# Patient Record
Sex: FEMALE | Race: WHITE | NOT HISPANIC OR LATINO | Employment: FULL TIME | URBAN - METROPOLITAN AREA
[De-identification: names, ages, dates, MRNs, and addresses within clinical notes are randomized per-mention and may not be internally consistent; named-entity substitution may affect disease eponyms.]

---

## 2020-04-09 PROCEDURE — 87635 SARS-COV-2 COVID-19 AMP PRB: CPT | Performed by: NURSE PRACTITIONER

## 2020-04-09 PROCEDURE — U0003 INFECTIOUS AGENT DETECTION BY NUCLEIC ACID (DNA OR RNA); SEVERE ACUTE RESPIRATORY SYNDROME CORONAVIRUS 2 (SARS-COV-2) (CORONAVIRUS DISEASE [COVID-19]), AMPLIFIED PROBE TECHNIQUE, MAKING USE OF HIGH THROUGHPUT TECHNOLOGIES AS DESCRIBED BY CMS-2020-01-R: HCPCS | Performed by: NURSE PRACTITIONER

## 2020-04-11 ENCOUNTER — TELEPHONE (OUTPATIENT)
Dept: URGENT CARE | Facility: CLINIC | Age: 50
End: 2020-04-11

## 2020-12-17 PROCEDURE — U0003 INFECTIOUS AGENT DETECTION BY NUCLEIC ACID (DNA OR RNA); SEVERE ACUTE RESPIRATORY SYNDROME CORONAVIRUS 2 (SARS-COV-2) (CORONAVIRUS DISEASE [COVID-19]), AMPLIFIED PROBE TECHNIQUE, MAKING USE OF HIGH THROUGHPUT TECHNOLOGIES AS DESCRIBED BY CMS-2020-01-R: HCPCS | Performed by: FAMILY MEDICINE

## 2021-03-30 ENCOUNTER — OFFICE VISIT (OUTPATIENT)
Dept: FAMILY MEDICINE CLINIC | Facility: CLINIC | Age: 51
End: 2021-03-30

## 2021-03-30 VITALS
BODY MASS INDEX: 46.6 KG/M2 | HEIGHT: 63 IN | DIASTOLIC BLOOD PRESSURE: 100 MMHG | HEART RATE: 115 BPM | SYSTOLIC BLOOD PRESSURE: 157 MMHG | TEMPERATURE: 97.5 F | OXYGEN SATURATION: 98 % | WEIGHT: 263 LBS

## 2021-03-30 DIAGNOSIS — Z12.11 COLON CANCER SCREENING: ICD-10-CM

## 2021-03-30 DIAGNOSIS — Z12.83 SKIN CANCER SCREENING: ICD-10-CM

## 2021-03-30 DIAGNOSIS — E78.5 DYSLIPIDEMIA: ICD-10-CM

## 2021-03-30 DIAGNOSIS — R63.5 WEIGHT GAIN: ICD-10-CM

## 2021-03-30 DIAGNOSIS — R79.0 DECREASED FERRITIN: ICD-10-CM

## 2021-03-30 DIAGNOSIS — R73.09 ELEVATED GLUCOSE: ICD-10-CM

## 2021-03-30 DIAGNOSIS — E55.9 VITAMIN D DEFICIENCY: Primary | ICD-10-CM

## 2021-03-30 DIAGNOSIS — E61.1 IRON DEFICIENCY: ICD-10-CM

## 2021-03-30 DIAGNOSIS — I10 ESSENTIAL HYPERTENSION: ICD-10-CM

## 2021-03-30 DIAGNOSIS — Z12.31 ENCOUNTER FOR SCREENING MAMMOGRAM FOR MALIGNANT NEOPLASM OF BREAST: ICD-10-CM

## 2021-03-30 PROCEDURE — 82306 VITAMIN D 25 HYDROXY: CPT | Performed by: FAMILY MEDICINE

## 2021-03-30 PROCEDURE — 85025 COMPLETE CBC W/AUTO DIFF WBC: CPT | Performed by: FAMILY MEDICINE

## 2021-03-30 PROCEDURE — 84466 ASSAY OF TRANSFERRIN: CPT | Performed by: FAMILY MEDICINE

## 2021-03-30 PROCEDURE — 84439 ASSAY OF FREE THYROXINE: CPT | Performed by: FAMILY MEDICINE

## 2021-03-30 PROCEDURE — 80061 LIPID PANEL: CPT | Performed by: FAMILY MEDICINE

## 2021-03-30 PROCEDURE — 83036 HEMOGLOBIN GLYCOSYLATED A1C: CPT | Performed by: FAMILY MEDICINE

## 2021-03-30 PROCEDURE — 83540 ASSAY OF IRON: CPT | Performed by: FAMILY MEDICINE

## 2021-03-30 PROCEDURE — 99203 OFFICE O/P NEW LOW 30 MIN: CPT | Performed by: FAMILY MEDICINE

## 2021-03-30 PROCEDURE — 80053 COMPREHEN METABOLIC PANEL: CPT | Performed by: FAMILY MEDICINE

## 2021-03-30 PROCEDURE — 84443 ASSAY THYROID STIM HORMONE: CPT | Performed by: FAMILY MEDICINE

## 2021-03-30 RX ORDER — BISOPROLOL FUMARATE AND HYDROCHLOROTHIAZIDE 5; 6.25 MG/1; MG/1
1 TABLET ORAL DAILY
Qty: 30 TABLET | Refills: 1 | Status: SHIPPED | OUTPATIENT
Start: 2021-03-30 | End: 2021-05-26 | Stop reason: SDUPTHER

## 2021-03-31 LAB
25(OH)D3 SERPL-MCNC: 55.5 NG/ML (ref 30–100)
ALBUMIN SERPL-MCNC: 4.5 G/DL (ref 3.5–5.2)
ALBUMIN/GLOB SERPL: 1.4 G/DL
ALP SERPL-CCNC: 160 U/L (ref 39–117)
ALT SERPL W P-5'-P-CCNC: 35 U/L (ref 1–33)
ANION GAP SERPL CALCULATED.3IONS-SCNC: 11.3 MMOL/L (ref 5–15)
AST SERPL-CCNC: 29 U/L (ref 1–32)
BASOPHILS # BLD AUTO: 0.08 10*3/MM3 (ref 0–0.2)
BASOPHILS NFR BLD AUTO: 1 % (ref 0–1.5)
BILIRUB SERPL-MCNC: 0.5 MG/DL (ref 0–1.2)
BUN SERPL-MCNC: 12 MG/DL (ref 6–20)
BUN/CREAT SERPL: 14.6 (ref 7–25)
CALCIUM SPEC-SCNC: 10.2 MG/DL (ref 8.6–10.5)
CHLORIDE SERPL-SCNC: 100 MMOL/L (ref 98–107)
CHOLEST SERPL-MCNC: 194 MG/DL (ref 0–200)
CO2 SERPL-SCNC: 25.7 MMOL/L (ref 22–29)
CREAT SERPL-MCNC: 0.82 MG/DL (ref 0.57–1)
DEPRECATED RDW RBC AUTO: 43.9 FL (ref 37–54)
EOSINOPHIL # BLD AUTO: 0.06 10*3/MM3 (ref 0–0.4)
EOSINOPHIL NFR BLD AUTO: 0.8 % (ref 0.3–6.2)
ERYTHROCYTE [DISTWIDTH] IN BLOOD BY AUTOMATED COUNT: 14.6 % (ref 12.3–15.4)
GFR SERPL CREATININE-BSD FRML MDRD: 73 ML/MIN/1.73
GLOBULIN UR ELPH-MCNC: 3.2 GM/DL
GLUCOSE SERPL-MCNC: 100 MG/DL (ref 65–99)
HBA1C MFR BLD: 5.3 % (ref 3.5–5.6)
HCT VFR BLD AUTO: 46.4 % (ref 34–46.6)
HDLC SERPL-MCNC: 59 MG/DL (ref 40–60)
HGB BLD-MCNC: 15 G/DL (ref 12–15.9)
IMM GRANULOCYTES # BLD AUTO: 0.02 10*3/MM3 (ref 0–0.05)
IMM GRANULOCYTES NFR BLD AUTO: 0.3 % (ref 0–0.5)
IRON 24H UR-MRATE: 122 MCG/DL (ref 37–145)
IRON SATN MFR SERPL: 26 % (ref 20–50)
LDLC SERPL CALC-MCNC: 120 MG/DL (ref 0–100)
LDLC/HDLC SERPL: 2.01 {RATIO}
LYMPHOCYTES # BLD AUTO: 1.39 10*3/MM3 (ref 0.7–3.1)
LYMPHOCYTES NFR BLD AUTO: 18.1 % (ref 19.6–45.3)
MCH RBC QN AUTO: 26.6 PG (ref 26.6–33)
MCHC RBC AUTO-ENTMCNC: 32.3 G/DL (ref 31.5–35.7)
MCV RBC AUTO: 82.3 FL (ref 79–97)
MONOCYTES # BLD AUTO: 0.44 10*3/MM3 (ref 0.1–0.9)
MONOCYTES NFR BLD AUTO: 5.7 % (ref 5–12)
NEUTROPHILS NFR BLD AUTO: 5.69 10*3/MM3 (ref 1.7–7)
NEUTROPHILS NFR BLD AUTO: 74.1 % (ref 42.7–76)
NRBC BLD AUTO-RTO: 0 /100 WBC (ref 0–0.2)
PLATELET # BLD AUTO: 300 10*3/MM3 (ref 140–450)
PMV BLD AUTO: 12 FL (ref 6–12)
POTASSIUM SERPL-SCNC: 4 MMOL/L (ref 3.5–5.2)
PROT SERPL-MCNC: 7.7 G/DL (ref 6–8.5)
RBC # BLD AUTO: 5.64 10*6/MM3 (ref 3.77–5.28)
SODIUM SERPL-SCNC: 137 MMOL/L (ref 136–145)
T4 FREE SERPL-MCNC: 1.37 NG/DL (ref 0.93–1.7)
TIBC SERPL-MCNC: 477 MCG/DL (ref 298–536)
TRANSFERRIN SERPL-MCNC: 320 MG/DL (ref 200–360)
TRIGL SERPL-MCNC: 82 MG/DL (ref 0–150)
TSH SERPL DL<=0.05 MIU/L-ACNC: 0.93 UIU/ML (ref 0.27–4.2)
VLDLC SERPL-MCNC: 15 MG/DL (ref 5–40)
WBC # BLD AUTO: 7.68 10*3/MM3 (ref 3.4–10.8)

## 2021-04-12 ENCOUNTER — APPOINTMENT (OUTPATIENT)
Dept: OTHER | Facility: HOSPITAL | Age: 51
End: 2021-04-12

## 2021-04-12 ENCOUNTER — HOSPITAL ENCOUNTER (OUTPATIENT)
Dept: MAMMOGRAPHY | Facility: HOSPITAL | Age: 51
Discharge: HOME | End: 2021-04-12
Admitting: FAMILY MEDICINE

## 2021-04-12 DIAGNOSIS — Z09 FOLLOW UP: ICD-10-CM

## 2021-04-12 DIAGNOSIS — Z12.31 ENCOUNTER FOR SCREENING MAMMOGRAM FOR MALIGNANT NEOPLASM OF BREAST: ICD-10-CM

## 2021-04-12 PROCEDURE — 77063 BREAST TOMOSYNTHESIS BI: CPT

## 2021-04-12 PROCEDURE — 77067 SCR MAMMO BI INCL CAD: CPT

## 2021-04-23 ENCOUNTER — HOSPITAL ENCOUNTER (OUTPATIENT)
Dept: MAMMOGRAPHY | Facility: HOSPITAL | Age: 51
Discharge: HOME | End: 2021-04-23

## 2021-04-23 ENCOUNTER — HOSPITAL ENCOUNTER (OUTPATIENT)
Dept: ULTRASOUND IMAGING | Facility: HOSPITAL | Age: 51
Discharge: HOME | End: 2021-04-23

## 2021-04-23 DIAGNOSIS — R92.8 ABNORMALITY OF BOTH BREASTS ON SCREENING MAMMOGRAM: ICD-10-CM

## 2021-04-23 PROCEDURE — 77066 DX MAMMO INCL CAD BI: CPT

## 2021-04-23 PROCEDURE — 76642 ULTRASOUND BREAST LIMITED: CPT

## 2021-04-23 PROCEDURE — G0279 TOMOSYNTHESIS, MAMMO: HCPCS

## 2021-04-26 ENCOUNTER — APPOINTMENT (OUTPATIENT)
Dept: MAMMOGRAPHY | Facility: HOSPITAL | Age: 51
End: 2021-04-26

## 2021-04-26 ENCOUNTER — APPOINTMENT (OUTPATIENT)
Dept: ULTRASOUND IMAGING | Facility: HOSPITAL | Age: 51
End: 2021-04-26

## 2021-04-26 ENCOUNTER — TELEPHONE (OUTPATIENT)
Dept: FAMILY MEDICINE CLINIC | Facility: CLINIC | Age: 51
End: 2021-04-26

## 2021-04-26 NOTE — TELEPHONE ENCOUNTER
Hub is instructed to read this note to patient.  CALLED PT. NO ANSWER. PER HIPAA, MAY LEAVE DETAILED VM. VM LEFT ADVISING PT THAT MAMMOGRAM WITH ULTRASOUND DO SHOW A LIKELY BENIGN LESION. THEY ARE RECOMMENDING A 6 MO FOLLOW UP WITH A DIAGNOSTIC MAMMOGRAM AND ULTRASOUND TO DETERMINE STABILITY.

## 2021-04-26 NOTE — TELEPHONE ENCOUNTER
----- Message from Ericka Peñaloza MD sent at 4/25/2021 10:15 AM EDT -----  MAMMOGRAM AND US DO SHOW LIKELY BENIGN LESION, RECOMMED FU 6 MOS.  SEE REPROTS

## 2021-05-03 ENCOUNTER — TELEPHONE (OUTPATIENT)
Dept: FAMILY MEDICINE CLINIC | Facility: CLINIC | Age: 51
End: 2021-05-03

## 2021-05-26 NOTE — TELEPHONE ENCOUNTER
Last visit:  3/30/21  Next visit:7/1/21  Last labs: 3/30/21    Rx requested: Ziac   Pharmacy: [facility]

## 2021-05-27 RX ORDER — BISOPROLOL FUMARATE AND HYDROCHLOROTHIAZIDE 5; 6.25 MG/1; MG/1
1 TABLET ORAL DAILY
Qty: 30 TABLET | Refills: 1 | Status: SHIPPED | OUTPATIENT
Start: 2021-05-27 | End: 2021-07-26 | Stop reason: SDUPTHER

## 2021-06-17 ENCOUNTER — TELEPHONE (OUTPATIENT)
Dept: FAMILY MEDICINE CLINIC | Facility: CLINIC | Age: 51
End: 2021-06-17

## 2021-07-21 ENCOUNTER — OFFICE VISIT (OUTPATIENT)
Dept: FAMILY MEDICINE CLINIC | Facility: CLINIC | Age: 51
End: 2021-07-21

## 2021-07-21 VITALS
HEIGHT: 63 IN | BODY MASS INDEX: 43.41 KG/M2 | HEART RATE: 90 BPM | TEMPERATURE: 97 F | OXYGEN SATURATION: 99 % | WEIGHT: 245 LBS | SYSTOLIC BLOOD PRESSURE: 139 MMHG | DIASTOLIC BLOOD PRESSURE: 85 MMHG

## 2021-07-21 DIAGNOSIS — I10 ESSENTIAL HYPERTENSION: ICD-10-CM

## 2021-07-21 DIAGNOSIS — R79.89 ELEVATED LFTS: ICD-10-CM

## 2021-07-21 DIAGNOSIS — Z00.00 PHYSICAL EXAM, ANNUAL: ICD-10-CM

## 2021-07-21 DIAGNOSIS — Z12.4 PAP SMEAR FOR CERVICAL CANCER SCREENING: ICD-10-CM

## 2021-07-21 DIAGNOSIS — R73.09 ELEVATED GLUCOSE: Primary | ICD-10-CM

## 2021-07-21 DIAGNOSIS — E66.01 MORBID OBESITY WITH BMI OF 40.0-44.9, ADULT (HCC): ICD-10-CM

## 2021-07-21 PROCEDURE — 99396 PREV VISIT EST AGE 40-64: CPT | Performed by: FAMILY MEDICINE

## 2021-07-22 PROCEDURE — 87591 N.GONORRHOEAE DNA AMP PROB: CPT | Performed by: FAMILY MEDICINE

## 2021-07-22 PROCEDURE — 87491 CHLMYD TRACH DNA AMP PROBE: CPT | Performed by: FAMILY MEDICINE

## 2021-07-22 PROCEDURE — 88175 CYTOPATH C/V AUTO FLUID REDO: CPT | Performed by: FAMILY MEDICINE

## 2021-07-26 RX ORDER — BISOPROLOL FUMARATE AND HYDROCHLOROTHIAZIDE 5; 6.25 MG/1; MG/1
1 TABLET ORAL DAILY
Qty: 90 TABLET | Refills: 1 | Status: SHIPPED | OUTPATIENT
Start: 2021-07-26 | End: 2022-01-21 | Stop reason: SDUPTHER

## 2021-07-26 NOTE — TELEPHONE ENCOUNTER
Date of last refill:05/27/2021    Is there an Inspect on file:NA     Last Appointment:07/21/2021    Next Appointment: 01/19/2022       Additional information:

## 2021-07-27 LAB
C TRACH RRNA CVX QL NAA+PROBE: NEGATIVE
CONV .: NORMAL
CYTOLOGIST CVX/VAG CYTO: NORMAL
CYTOLOGY CVX/VAG DOC CYTO: NORMAL
CYTOLOGY CVX/VAG DOC THIN PREP: NORMAL
DX ICD CODE: NORMAL
HIV 1 & 2 AB SER-IMP: NORMAL
N GONORRHOEA RRNA CVX QL NAA+PROBE: NEGATIVE
OTHER STN SPEC: NORMAL
STAT OF ADQ CVX/VAG CYTO-IMP: NORMAL

## 2021-10-26 ENCOUNTER — TELEPHONE (OUTPATIENT)
Dept: FAMILY MEDICINE CLINIC | Facility: CLINIC | Age: 51
End: 2021-10-26

## 2021-10-26 DIAGNOSIS — R92.8 ABNORMAL MAMMOGRAM OF LEFT BREAST: Primary | ICD-10-CM

## 2021-10-26 NOTE — TELEPHONE ENCOUNTER
----- Message from Idalia Oliver sent at 10/26/2021 12:54 PM EDT -----  Regarding: Mammogram  I had a mammogram and follow up mammogram with ultrasound back in April. At that time, the radiologist recommended I have a follow up mammogram on the left breast in 6 months.  I called [facility] to set up an appt for that, and they said they just need you to put in an order for it so we can get it going.      Thank you, Idalia Oliver

## 2021-11-01 ENCOUNTER — CLINICAL SUPPORT (OUTPATIENT)
Dept: FAMILY MEDICINE CLINIC | Facility: CLINIC | Age: 51
End: 2021-11-01

## 2021-11-01 DIAGNOSIS — R79.89 ELEVATED LFTS: ICD-10-CM

## 2021-11-01 DIAGNOSIS — R73.09 ELEVATED GLUCOSE: ICD-10-CM

## 2021-11-01 PROCEDURE — 80053 COMPREHEN METABOLIC PANEL: CPT | Performed by: FAMILY MEDICINE

## 2021-11-01 PROCEDURE — 36415 COLL VENOUS BLD VENIPUNCTURE: CPT | Performed by: FAMILY MEDICINE

## 2021-11-01 NOTE — PROGRESS NOTES
Venipuncture Blood Specimen Collection  Venipuncture performed in RAC by Penny Jimenez CMA with good hemostasis. Patient tolerated the procedure well without complications.   11/01/21   Penny Jimenez CMA

## 2021-11-02 LAB
ALBUMIN SERPL-MCNC: 4.2 G/DL (ref 3.5–5.2)
ALBUMIN/GLOB SERPL: 1.4 G/DL
ALP SERPL-CCNC: 154 U/L (ref 39–117)
ALT SERPL W P-5'-P-CCNC: 32 U/L (ref 1–33)
ANION GAP SERPL CALCULATED.3IONS-SCNC: 10.9 MMOL/L (ref 5–15)
AST SERPL-CCNC: 25 U/L (ref 1–32)
BILIRUB SERPL-MCNC: 0.8 MG/DL (ref 0–1.2)
BUN SERPL-MCNC: 16 MG/DL (ref 6–20)
BUN/CREAT SERPL: 21.3 (ref 7–25)
CALCIUM SPEC-SCNC: 9.8 MG/DL (ref 8.6–10.5)
CHLORIDE SERPL-SCNC: 104 MMOL/L (ref 98–107)
CO2 SERPL-SCNC: 26.1 MMOL/L (ref 22–29)
CREAT SERPL-MCNC: 0.75 MG/DL (ref 0.57–1)
GFR SERPL CREATININE-BSD FRML MDRD: 81 ML/MIN/1.73
GLOBULIN UR ELPH-MCNC: 3 GM/DL
GLUCOSE SERPL-MCNC: 97 MG/DL (ref 65–99)
POTASSIUM SERPL-SCNC: 4.1 MMOL/L (ref 3.5–5.2)
PROT SERPL-MCNC: 7.2 G/DL (ref 6–8.5)
SODIUM SERPL-SCNC: 141 MMOL/L (ref 136–145)

## 2021-11-16 ENCOUNTER — HOSPITAL ENCOUNTER (OUTPATIENT)
Dept: MAMMOGRAPHY | Facility: HOSPITAL | Age: 51
Discharge: HOME | End: 2021-11-16

## 2021-11-16 ENCOUNTER — HOSPITAL ENCOUNTER (OUTPATIENT)
Dept: ULTRASOUND IMAGING | Facility: HOSPITAL | Age: 51
Discharge: HOME | End: 2021-11-16

## 2021-11-16 DIAGNOSIS — R92.8 ABNORMAL MAMMOGRAM OF LEFT BREAST: ICD-10-CM

## 2021-11-16 PROCEDURE — G0279 TOMOSYNTHESIS, MAMMO: HCPCS

## 2021-11-16 PROCEDURE — 77065 DX MAMMO INCL CAD UNI: CPT

## 2021-12-02 ENCOUNTER — CLINICAL SUPPORT (OUTPATIENT)
Dept: FAMILY MEDICINE CLINIC | Facility: CLINIC | Age: 51
End: 2021-12-02

## 2021-12-02 DIAGNOSIS — Z11.59 SCREENING FOR MEASLES: Primary | ICD-10-CM

## 2021-12-02 PROCEDURE — 86765 RUBEOLA ANTIBODY: CPT | Performed by: FAMILY MEDICINE

## 2021-12-02 PROCEDURE — 86735 MUMPS ANTIBODY: CPT | Performed by: FAMILY MEDICINE

## 2021-12-02 PROCEDURE — 36415 COLL VENOUS BLD VENIPUNCTURE: CPT | Performed by: FAMILY MEDICINE

## 2021-12-02 PROCEDURE — 86762 RUBELLA ANTIBODY: CPT | Performed by: FAMILY MEDICINE

## 2021-12-02 NOTE — PROGRESS NOTES
Venipuncture Blood Specimen Collection  Venipuncture performed in RAC by Penny Jimenez CMA with good hemostasis. Patient tolerated the procedure well without complications.   12/02/21   Penny Jimenez CMA

## 2021-12-03 LAB
MEV IGG SER IA-ACNC: >300 AU/ML
MUV IGG SER IA-ACNC: 190 AU/ML
RUBV IGG SERPL IA-ACNC: 6.97 INDEX

## 2022-01-21 RX ORDER — BISOPROLOL FUMARATE AND HYDROCHLOROTHIAZIDE 5; 6.25 MG/1; MG/1
1 TABLET ORAL DAILY
Qty: 90 TABLET | Refills: 1 | Status: SHIPPED | OUTPATIENT
Start: 2022-01-21 | End: 2022-07-27 | Stop reason: SDUPTHER

## 2022-02-01 ENCOUNTER — TELEPHONE (OUTPATIENT)
Dept: FAMILY MEDICINE CLINIC | Facility: CLINIC | Age: 52
End: 2022-02-01

## 2022-02-01 NOTE — TELEPHONE ENCOUNTER
LEFT PATIENT A VOICEMAIL, WE HAVE HER SCHEDULED NEXT WEEK WITH  AND ARE NEEDING TO R/S THIS APPOINTMENT.

## 2022-02-22 ENCOUNTER — OFFICE VISIT (OUTPATIENT)
Dept: FAMILY MEDICINE CLINIC | Facility: CLINIC | Age: 52
End: 2022-02-22

## 2022-02-22 VITALS
DIASTOLIC BLOOD PRESSURE: 90 MMHG | WEIGHT: 257 LBS | HEART RATE: 90 BPM | SYSTOLIC BLOOD PRESSURE: 134 MMHG | HEIGHT: 63 IN | TEMPERATURE: 97.7 F | BODY MASS INDEX: 45.54 KG/M2 | OXYGEN SATURATION: 98 %

## 2022-02-22 DIAGNOSIS — Z12.11 ENCOUNTER FOR COLORECTAL CANCER SCREENING: ICD-10-CM

## 2022-02-22 DIAGNOSIS — R79.89 ELEVATED LFTS: ICD-10-CM

## 2022-02-22 DIAGNOSIS — R73.09 ELEVATED GLUCOSE: ICD-10-CM

## 2022-02-22 DIAGNOSIS — Z79.899 MEDICATION MANAGEMENT: ICD-10-CM

## 2022-02-22 DIAGNOSIS — Z11.59 NEED FOR HEPATITIS C SCREENING TEST: ICD-10-CM

## 2022-02-22 DIAGNOSIS — R05.9 COUGH: Primary | ICD-10-CM

## 2022-02-22 DIAGNOSIS — R05.9 COUGH: ICD-10-CM

## 2022-02-22 DIAGNOSIS — Z12.12 ENCOUNTER FOR COLORECTAL CANCER SCREENING: ICD-10-CM

## 2022-02-22 DIAGNOSIS — E78.5 DYSLIPIDEMIA: ICD-10-CM

## 2022-02-22 DIAGNOSIS — R53.82 CHRONIC FATIGUE: ICD-10-CM

## 2022-02-22 LAB
ALBUMIN SERPL-MCNC: 4.2 G/DL (ref 3.5–5.2)
ALBUMIN/GLOB SERPL: 1.2 G/DL
ALP SERPL-CCNC: 187 U/L (ref 39–117)
ALT SERPL W P-5'-P-CCNC: 47 U/L (ref 1–33)
ANION GAP SERPL CALCULATED.3IONS-SCNC: 13.5 MMOL/L (ref 5–15)
AST SERPL-CCNC: 21 U/L (ref 1–32)
BASOPHILS # BLD AUTO: 0.07 10*3/MM3 (ref 0–0.2)
BASOPHILS NFR BLD AUTO: 1 % (ref 0–1.5)
BILIRUB SERPL-MCNC: 0.4 MG/DL (ref 0–1.2)
BUN SERPL-MCNC: 15 MG/DL (ref 6–20)
BUN/CREAT SERPL: 17.4 (ref 7–25)
CALCIUM SPEC-SCNC: 10.3 MG/DL (ref 8.6–10.5)
CHLORIDE SERPL-SCNC: 106 MMOL/L (ref 98–107)
CHOLEST SERPL-MCNC: 181 MG/DL (ref 0–200)
CO2 SERPL-SCNC: 22.5 MMOL/L (ref 22–29)
CREAT SERPL-MCNC: 0.86 MG/DL (ref 0.57–1)
DEPRECATED RDW RBC AUTO: 40.5 FL (ref 37–54)
EOSINOPHIL # BLD AUTO: 0.09 10*3/MM3 (ref 0–0.4)
EOSINOPHIL NFR BLD AUTO: 1.3 % (ref 0.3–6.2)
ERYTHROCYTE [DISTWIDTH] IN BLOOD BY AUTOMATED COUNT: 14 % (ref 12.3–15.4)
GFR SERPL CREATININE-BSD FRML MDRD: 69 ML/MIN/1.73
GLOBULIN UR ELPH-MCNC: 3.4 GM/DL
GLUCOSE SERPL-MCNC: 101 MG/DL (ref 65–99)
HCT VFR BLD AUTO: 44.7 % (ref 34–46.6)
HCV AB SER DONR QL: NORMAL
HDLC SERPL-MCNC: 51 MG/DL (ref 40–60)
HGB BLD-MCNC: 14.6 G/DL (ref 12–15.9)
IMM GRANULOCYTES # BLD AUTO: 0.04 10*3/MM3 (ref 0–0.05)
IMM GRANULOCYTES NFR BLD AUTO: 0.6 % (ref 0–0.5)
LDLC SERPL CALC-MCNC: 116 MG/DL (ref 0–100)
LDLC/HDLC SERPL: 2.26 {RATIO}
LYMPHOCYTES # BLD AUTO: 1.6 10*3/MM3 (ref 0.7–3.1)
LYMPHOCYTES NFR BLD AUTO: 22.9 % (ref 19.6–45.3)
MCH RBC QN AUTO: 26.7 PG (ref 26.6–33)
MCHC RBC AUTO-ENTMCNC: 32.7 G/DL (ref 31.5–35.7)
MCV RBC AUTO: 81.9 FL (ref 79–97)
MONOCYTES # BLD AUTO: 0.33 10*3/MM3 (ref 0.1–0.9)
MONOCYTES NFR BLD AUTO: 4.7 % (ref 5–12)
NEUTROPHILS NFR BLD AUTO: 4.87 10*3/MM3 (ref 1.7–7)
NEUTROPHILS NFR BLD AUTO: 69.5 % (ref 42.7–76)
NRBC BLD AUTO-RTO: 0 /100 WBC (ref 0–0.2)
PLATELET # BLD AUTO: 280 10*3/MM3 (ref 140–450)
PMV BLD AUTO: 12 FL (ref 6–12)
POTASSIUM SERPL-SCNC: 4.4 MMOL/L (ref 3.5–5.2)
PROT SERPL-MCNC: 7.6 G/DL (ref 6–8.5)
RBC # BLD AUTO: 5.46 10*6/MM3 (ref 3.77–5.28)
SODIUM SERPL-SCNC: 142 MMOL/L (ref 136–145)
T4 FREE SERPL-MCNC: 1.2 NG/DL (ref 0.93–1.7)
TRIGL SERPL-MCNC: 73 MG/DL (ref 0–150)
TSH SERPL DL<=0.05 MIU/L-ACNC: 0.79 UIU/ML (ref 0.27–4.2)
VLDLC SERPL-MCNC: 14 MG/DL (ref 5–40)
WBC NRBC COR # BLD: 7 10*3/MM3 (ref 3.4–10.8)

## 2022-02-22 PROCEDURE — 80061 LIPID PANEL: CPT | Performed by: FAMILY MEDICINE

## 2022-02-22 PROCEDURE — 84439 ASSAY OF FREE THYROXINE: CPT | Performed by: FAMILY MEDICINE

## 2022-02-22 PROCEDURE — 86803 HEPATITIS C AB TEST: CPT | Performed by: FAMILY MEDICINE

## 2022-02-22 PROCEDURE — 99214 OFFICE O/P EST MOD 30 MIN: CPT | Performed by: FAMILY MEDICINE

## 2022-02-22 PROCEDURE — 80050 GENERAL HEALTH PANEL: CPT | Performed by: FAMILY MEDICINE

## 2022-02-22 PROCEDURE — 83036 HEMOGLOBIN GLYCOSYLATED A1C: CPT | Performed by: FAMILY MEDICINE

## 2022-02-22 RX ORDER — AZELASTINE 1 MG/ML
2 SPRAY, METERED NASAL 2 TIMES DAILY
Qty: 30 ML | Refills: 2 | Status: SHIPPED | OUTPATIENT
Start: 2022-02-22

## 2022-02-22 RX ORDER — DOXYCYCLINE 100 MG/1
100 CAPSULE ORAL 2 TIMES DAILY
Qty: 20 CAPSULE | Refills: 0 | Status: SHIPPED | OUTPATIENT
Start: 2022-02-22 | End: 2022-08-12 | Stop reason: ALTCHOICE

## 2022-02-22 RX ORDER — GUAIFENESIN 600 MG/1
1200 TABLET, EXTENDED RELEASE ORAL 2 TIMES DAILY
COMMUNITY
End: 2022-08-12 | Stop reason: ALTCHOICE

## 2022-02-22 RX ORDER — BENZONATATE 200 MG/1
200 CAPSULE ORAL 3 TIMES DAILY PRN
Qty: 45 CAPSULE | Refills: 1 | Status: SHIPPED | OUTPATIENT
Start: 2022-02-22 | End: 2022-08-12 | Stop reason: ALTCHOICE

## 2022-02-23 LAB — HBA1C MFR BLD: 5.4 % (ref 3.5–5.6)

## 2022-03-21 ENCOUNTER — TELEPHONE (OUTPATIENT)
Dept: FAMILY MEDICINE CLINIC | Facility: CLINIC | Age: 52
End: 2022-03-21

## 2022-03-21 NOTE — TELEPHONE ENCOUNTER
CALLED PT NO ANSWER, CALLED PTS  PER EDMAR ABLE TO INFORM HIM OF RESULTS AND ORDERS. ADVISED HIM TO HAVE PT CALL US WITH FURTHER QUESTIONS AND IF AGREEABLE TO US OF LIVER.     ----- Message from Ericka Peñaloza MD sent at 3/21/2022 12:12 PM EDT -----  Labs show elevated lft, recommend getting US liver  Cholesterol a litle elevated  Low cholesterol diet  Other labs inacceptable range

## 2022-06-16 ENCOUNTER — TRANSCRIBE ORDERS (OUTPATIENT)
Dept: ADMINISTRATIVE | Facility: HOSPITAL | Age: 52
End: 2022-06-16

## 2022-06-16 DIAGNOSIS — Z12.31 VISIT FOR SCREENING MAMMOGRAM: Primary | ICD-10-CM

## 2022-06-20 ENCOUNTER — HOSPITAL ENCOUNTER (OUTPATIENT)
Dept: MAMMOGRAPHY | Facility: HOSPITAL | Age: 52
Discharge: HOME | End: 2022-06-20
Admitting: FAMILY MEDICINE

## 2022-06-20 DIAGNOSIS — Z12.31 VISIT FOR SCREENING MAMMOGRAM: ICD-10-CM

## 2022-06-20 PROCEDURE — 77063 BREAST TOMOSYNTHESIS BI: CPT

## 2022-06-20 PROCEDURE — 77067 SCR MAMMO BI INCL CAD: CPT

## 2022-07-27 RX ORDER — BISOPROLOL FUMARATE AND HYDROCHLOROTHIAZIDE 5; 6.25 MG/1; MG/1
1 TABLET ORAL DAILY
Qty: 90 TABLET | Refills: 1 | Status: SHIPPED | OUTPATIENT
Start: 2022-07-27 | End: 2023-01-23 | Stop reason: SDUPTHER

## 2022-08-12 ENCOUNTER — OFFICE VISIT (OUTPATIENT)
Dept: FAMILY MEDICINE CLINIC | Facility: CLINIC | Age: 52
End: 2022-08-12

## 2022-08-12 VITALS
HEART RATE: 87 BPM | HEIGHT: 63 IN | DIASTOLIC BLOOD PRESSURE: 91 MMHG | BODY MASS INDEX: 39.34 KG/M2 | WEIGHT: 222 LBS | SYSTOLIC BLOOD PRESSURE: 148 MMHG | RESPIRATION RATE: 16 BRPM | TEMPERATURE: 97.7 F

## 2022-08-12 DIAGNOSIS — R71.8 RBC ABNORMALITY: ICD-10-CM

## 2022-08-12 DIAGNOSIS — R74.8 ELEVATED LIVER ENZYMES: Primary | ICD-10-CM

## 2022-08-12 PROCEDURE — 99213 OFFICE O/P EST LOW 20 MIN: CPT | Performed by: NURSE PRACTITIONER

## 2022-08-12 PROCEDURE — 80053 COMPREHEN METABOLIC PANEL: CPT | Performed by: NURSE PRACTITIONER

## 2022-08-12 RX ORDER — MULTIPLE VITAMINS W/ MINERALS TAB 9MG-400MCG
1 TAB ORAL DAILY
COMMUNITY

## 2022-08-12 NOTE — PROGRESS NOTES
"Chief Complaint  Vitamin D Deficiency (/) and Establish Care    Subjective        Idalia Oliver presents to [facility]  History of Present Illness  The patient presents as a new patient transferring care from Dr. Peñaloza.     She recently completed lab work and her liver enzymes were elevated, and she is requesting to repeat lab work. She states Dr. Peñaloza mentioned an ultrasound after repeating her lab work. Her RBC was slightly elevated. Her hepatitis C was normal.     She notes weight loss. She lost approximately 30 pounds doing the [employer] weight loss program, and she is thinking about restarting the program. She is currently trying to lose weight on her own and has only lost 2 pounds since stopping the weight loss program.     Her blood pressure is slightly elevated. She monitors her blood pressure at home. She states she has \"white coat syndrome\".    She currently has not completed a colonoscopy, but she plans to do so. The patient is up to date on her mammogram, which was completed in 06/2022. Her uterus and cervix are still present, and her last Pap smear was completed in 2021.    She received the [employer] COVID-19 vaccine, but has not received any boosters. She has been diagnosed with COVID-19 twice.     She denies chest pain, shortness of breath, abdominal pain, or lower extremity swelling.    Her father has history of colon polyps.     Objective   Vital Signs:  /91 (BP Location: Right arm, Patient Position: Sitting, Cuff Size: Adult)   Pulse 87   Temp 97.7 °F (36.5 °C)   Resp 16   Ht 160 cm (63\")   Wt 101 kg (222 lb)   BMI 39.33 kg/m²   Estimated body mass index is 39.33 kg/m² as calculated from the following:    Height as of this encounter: 160 cm (63\").    Weight as of this encounter: 101 kg (222 lb).          Physical Exam  Vitals and nursing note reviewed.   Constitutional:       Appearance: Normal appearance. She is well-developed.   HENT:      Head: " Normocephalic and atraumatic.   Eyes:      Conjunctiva/sclera: Conjunctivae normal.      Pupils: Pupils are equal, round, and reactive to light.   Cardiovascular:      Rate and Rhythm: Normal rate and regular rhythm.      Heart sounds: Normal heart sounds.   Pulmonary:      Effort: Pulmonary effort is normal.      Breath sounds: Normal breath sounds.   Abdominal:      General: Bowel sounds are normal.      Palpations: Abdomen is soft.   Musculoskeletal:         General: Normal range of motion.      Cervical back: Normal range of motion and neck supple.   Skin:     General: Skin is warm and dry.   Neurological:      Mental Status: She is alert and oriented to person, place, and time.   Psychiatric:         Behavior: Behavior normal.         Thought Content: Thought content normal.         Judgment: Judgment normal.        Result Review :                Assessment and Plan   Diagnoses and all orders for this visit:    1. Elevated liver enzymes (Primary)  -     Comprehensive Metabolic Panel    2. RBC abnormality  -     CBC & Differential      1. Elevated liver enzymes  We are going to repeat CMP lab work and then an ultrasound of her liver will be ordered.     2. RBC abnormality   We are going to repeat CBC lab work.     Follow up in 3 months.        Follow Up   Return in about 3 months (around 11/12/2022) for Recheck.  Patient was given instructions and counseling regarding her condition or for health maintenance advice. Please see specific information pulled into the AVS if appropriate.     Transcribed from ambient dictation for SANYA Marquez by JIMI ROMERO.  08/12/22   10:54 EDT    Patient verbalized consent to the visit recording.

## 2022-08-12 NOTE — PROGRESS NOTES
Venipuncture Blood Specimen Collection  Venipuncture performed in right arm by Natasha Hionjosa MA with good hemostasis. Patient tolerated the procedure well without complications.   08/12/22   Natasha Hinojosa MA

## 2022-08-13 LAB
ALBUMIN SERPL-MCNC: 4.2 G/DL (ref 3.5–5.2)
ALBUMIN/GLOB SERPL: 1.5 G/DL
ALP SERPL-CCNC: 409 U/L (ref 39–117)
ALT SERPL W P-5'-P-CCNC: 131 U/L (ref 1–33)
ANION GAP SERPL CALCULATED.3IONS-SCNC: 11 MMOL/L (ref 5–15)
AST SERPL-CCNC: 94 U/L (ref 1–32)
BILIRUB SERPL-MCNC: 0.6 MG/DL (ref 0–1.2)
BUN SERPL-MCNC: 19 MG/DL (ref 6–20)
BUN/CREAT SERPL: 25.3 (ref 7–25)
CALCIUM SPEC-SCNC: 10.5 MG/DL (ref 8.6–10.5)
CHLORIDE SERPL-SCNC: 105 MMOL/L (ref 98–107)
CO2 SERPL-SCNC: 26 MMOL/L (ref 22–29)
CREAT SERPL-MCNC: 0.75 MG/DL (ref 0.57–1)
EGFRCR SERPLBLD CKD-EPI 2021: 95.3 ML/MIN/1.73
GLOBULIN UR ELPH-MCNC: 2.8 GM/DL
GLUCOSE SERPL-MCNC: 94 MG/DL (ref 65–99)
POTASSIUM SERPL-SCNC: 4.5 MMOL/L (ref 3.5–5.2)
PROT SERPL-MCNC: 7 G/DL (ref 6–8.5)
SODIUM SERPL-SCNC: 142 MMOL/L (ref 136–145)

## 2022-08-16 DIAGNOSIS — R79.89 ELEVATED LFTS: Primary | ICD-10-CM

## 2022-08-22 ENCOUNTER — HOSPITAL ENCOUNTER (OUTPATIENT)
Dept: ULTRASOUND IMAGING | Facility: HOSPITAL | Age: 52
Discharge: HOME | End: 2022-08-22
Admitting: NURSE PRACTITIONER

## 2022-08-22 DIAGNOSIS — R79.89 ELEVATED LFTS: ICD-10-CM

## 2022-08-22 PROCEDURE — 76705 ECHO EXAM OF ABDOMEN: CPT

## 2022-08-25 ENCOUNTER — TELEPHONE (OUTPATIENT)
Dept: FAMILY MEDICINE CLINIC | Facility: CLINIC | Age: 52
End: 2022-08-25

## 2022-08-25 NOTE — TELEPHONE ENCOUNTER
Caller: Idalia Oliver    Relationship: Self    Best call back number: [phone]*    What is the best time to reach you: ANYTIME    Who are you requesting to speak with (clinical staff, provider,  specific staff member): CLINICAL    What was the call regarding: PATIENT CALLING STATING THAT SHE RECEIVED A MESSAGE ABOUT WANTING THE PATIENT TO FOLLOW UP WITH GI SPECIALIST, DR. MANUEL? THE PATIENT STATES THAT SHE HAS NEVER SEEN THIS DOCTOR AND THE LAST GI SPECIALIST THAT SHE SAW WAS IN [place] IN 1995.    Do you require a callback: THE PATIENT IS REQUESTING A CALL BACK TO ADVISE.

## 2022-09-08 ENCOUNTER — TELEPHONE (OUTPATIENT)
Dept: FAMILY MEDICINE CLINIC | Facility: CLINIC | Age: 52
End: 2022-09-08

## 2022-12-12 ENCOUNTER — TELEPHONE (OUTPATIENT)
Dept: FAMILY MEDICINE CLINIC | Facility: CLINIC | Age: 52
End: 2022-12-12

## 2022-12-12 NOTE — TELEPHONE ENCOUNTER
HUB RELAYED MESSAGE TO PATIENT, RESCHEDULED FOR 1/12/23. SHE STATES IT WAS MAINLY A LIVER ENZYME RECHECK AND SHE WAS GOING TO HAVE LABS DRAWN, WANTS TO KNOW IF SHE CAN STILL COME IN FOR THE LABS NOW AND FOLLOW UP WITH ERIC IN January.    PLEASE ADVISE    CALLBACK: [phone]

## 2022-12-12 NOTE — TELEPHONE ENCOUNTER
HUB to read description below.    Left voicemail in regards to patients upcoming appointment. Unfortunately, provider will be out office. Please reschedule for the next available. If patient wishes to be seen sooner they can schedule with Wero Davis on [address]. If scheduling with Wero and they have never seen him please block this for 30 minutes.     Thanks!

## 2023-01-23 RX ORDER — BISOPROLOL FUMARATE AND HYDROCHLOROTHIAZIDE 5; 6.25 MG/1; MG/1
1 TABLET ORAL DAILY
Qty: 90 TABLET | Refills: 1 | Status: SHIPPED | OUTPATIENT
Start: 2023-01-23

## 2023-01-26 ENCOUNTER — OFFICE VISIT (OUTPATIENT)
Dept: FAMILY MEDICINE CLINIC | Facility: CLINIC | Age: 53
End: 2023-01-26
Payer: COMMERCIAL

## 2023-01-26 VITALS
HEART RATE: 77 BPM | SYSTOLIC BLOOD PRESSURE: 139 MMHG | TEMPERATURE: 97.6 F | OXYGEN SATURATION: 99 % | HEIGHT: 63 IN | WEIGHT: 243.6 LBS | BODY MASS INDEX: 43.16 KG/M2 | DIASTOLIC BLOOD PRESSURE: 89 MMHG

## 2023-01-26 DIAGNOSIS — R79.89 ELEVATED LFTS: Primary | ICD-10-CM

## 2023-01-26 DIAGNOSIS — Z13.1 SCREENING FOR DIABETES MELLITUS: ICD-10-CM

## 2023-01-26 DIAGNOSIS — Z13.220 SCREENING FOR LIPID DISORDERS: ICD-10-CM

## 2023-01-26 DIAGNOSIS — Z13.0 SCREENING, ANEMIA, DEFICIENCY, IRON: ICD-10-CM

## 2023-01-26 DIAGNOSIS — Z13.29 SCREENING FOR THYROID DISORDER: ICD-10-CM

## 2023-01-26 DIAGNOSIS — E66.01 MORBID OBESITY WITH BMI OF 40.0-44.9, ADULT: ICD-10-CM

## 2023-01-26 LAB — HBA1C MFR BLD: 4.7 % (ref 3.5–5.6)

## 2023-01-26 PROCEDURE — 99213 OFFICE O/P EST LOW 20 MIN: CPT | Performed by: FAMILY MEDICINE

## 2023-01-26 PROCEDURE — 80050 GENERAL HEALTH PANEL: CPT | Performed by: FAMILY MEDICINE

## 2023-01-26 PROCEDURE — 83036 HEMOGLOBIN GLYCOSYLATED A1C: CPT | Performed by: FAMILY MEDICINE

## 2023-01-26 PROCEDURE — 82977 ASSAY OF GGT: CPT | Performed by: FAMILY MEDICINE

## 2023-01-26 PROCEDURE — 80061 LIPID PANEL: CPT | Performed by: FAMILY MEDICINE

## 2023-01-26 NOTE — PROGRESS NOTES
Chief Complaint   Patient presents with   • Follow-up     Abnormal Labs and discuss liver ultrasound results       Subjective   Idalia Oliver is a 52 y.o. female.     Patient is here to follow-up from abnormal labs obtained in August 2022.  On her prior blood draw, her liver enzymes were noted to be significantly elevated.  Patient was on the [employer] weight loss diet at that time.  She has since stopped this diet.  Patient reports she had similar issues when she was on Shanae London many years ago.  The Shanae London and weight loss resulted in gallstones.  Patient reports she did have some small twinges in the right upper quadrant while on the diet that have since resolved.  She denies any bowel complaints at this time.  No nausea, no vomiting.  No jaundice.    She denies taking any oral supplements or OTC weight loss medications while dieting.  She did use food provided by the weight loss plan.  Prior screening for hepatitis C was negative.  Patient has been vaccinated against hepatitis B.  She denies any gastrointestinal infections around the time of her last blood draw.    Patient is scheduled to have screening colonoscopy with [facility] this year.    Answers for HPI/ROS submitted by the patient on 1/19/2023  Please describe your symptoms.: Follow up regarding liver enzymes.  Have you had these symptoms before?: Yes  How long have you been having these symptoms?: Greater than 2 weeks  Please list any medications you are currently taking for this condition.: Unchanged  Please describe any probable cause for these symptoms. : Follow up.  What is the primary reason for your visit?: Other    I have reviewed relevant past medical, family, social and surgical history for this patient.  Medications review is done by myself, with patient.      Past Medical History :  Active Ambulatory Problems     Diagnosis Date Noted   • No Active Ambulatory Problems     Resolved Ambulatory Problems     Diagnosis Date Noted   • No Resolved  "Ambulatory Problems     Past Medical History:   Diagnosis Date   • Hypertension    • Obesity        Medication List:    Current Outpatient Medications:   •  azelastine (ASTELIN) 0.1 % nasal spray, instill 2 sprays into the nostril(s) as directed by provider 2 (Two) Times a Day (Patient taking differently: 2 sprays into the nostril(s) as directed by provider As Needed.), Disp: 30 mL, Rfl: 2  •  bisoprolol-hydrochlorothiazide (ZIAC) 5-6.25 MG per tablet, Take 1 tablet by mouth Daily., Disp: 90 tablet, Rfl: 1  •  Cholecalciferol (VITAMIN D3 PO), Take 3,000 Units by mouth Daily., Disp: , Rfl:   •  multivitamin with minerals tablet tablet, Take 1 tablet by mouth Daily., Disp: , Rfl:     No Known Allergies    Social History     Tobacco Use   • Smoking status: Never   • Smokeless tobacco: Never   Substance Use Topics   • Alcohol use: Not Currently         Review of Systems   Constitutional: Positive for unexpected weight gain (off diet - she is not trying [employer]). Negative for chills and fever.   Gastrointestinal: Negative for abdominal pain, blood in stool, constipation, diarrhea, nausea, vomiting, GERD and indigestion.     Objective   Vitals:    01/26/23 0902   BP: 139/89   BP Location: Right arm   Patient Position: Sitting   Cuff Size: Large Adult   Pulse: 77   Temp: 97.6 °F (36.4 °C)   TempSrc: Temporal   SpO2: 99%   Weight: 110 kg (243 lb 9.6 oz)   Height: 160 cm (63\")     Body mass index is 43.15 kg/m².    Physical Exam  Constitutional:       General: She is not in acute distress.     Appearance: Normal appearance. She is well-developed.   HENT:      Head: Normocephalic and atraumatic.   Eyes:      General: No scleral icterus.        Right eye: No discharge.         Left eye: No discharge.      Extraocular Movements: Extraocular movements intact.      Conjunctiva/sclera: Conjunctivae normal.   Cardiovascular:      Rate and Rhythm: Normal rate and regular rhythm.      Heart sounds: Normal heart sounds. No " murmur heard.  Pulmonary:      Effort: Pulmonary effort is normal.      Breath sounds: Normal breath sounds.   Abdominal:      Palpations: Abdomen is soft.   Musculoskeletal:      Cervical back: Normal range of motion and neck supple.   Skin:     General: Skin is warm.      Capillary Refill: Capillary refill takes less than 2 seconds.      Findings: No rash.   Neurological:      General: No focal deficit present.      Mental Status: She is alert and oriented to person, place, and time. Mental status is at baseline.   Psychiatric:         Mood and Affect: Mood normal.         Behavior: Behavior normal.         Thought Content: Thought content normal.         Comprehensive Metabolic Panel    Collection Time: 08/12/22  9:25 AM    Specimen: Blood   Result Value Ref Range    Glucose 94 65 - 99 mg/dL    BUN 19 6 - 20 mg/dL    Creatinine 0.75 0.57 - 1.00 mg/dL    Sodium 142 136 - 145 mmol/L    Potassium 4.5 3.5 - 5.2 mmol/L    Chloride 105 98 - 107 mmol/L    CO2 26.0 22.0 - 29.0 mmol/L    Calcium 10.5 8.6 - 10.5 mg/dL    Total Protein 7.0 6.0 - 8.5 g/dL    Albumin 4.20 3.50 - 5.20 g/dL    ALT (SGPT) 131 (H) 1 - 33 U/L    AST (SGOT) 94 (H) 1 - 32 U/L    Alkaline Phosphatase 409 (H) 39 - 117 U/L    Total Bilirubin 0.6 0.0 - 1.2 mg/dL    Globulin 2.8 gm/dL    A/G Ratio 1.5 g/dL    BUN/Creatinine Ratio 25.3 (H) 7.0 - 25.0    Anion Gap 11.0 5.0 - 15.0 mmol/L    eGFR 95.3 >60.0 mL/min/1.73         Assessment & Plan     Diagnoses and all orders for this visit:    1. Elevated LFTs (Primary)  -     Comprehensive metabolic panel  -     Gamma GT    2. Screening for lipid disorders  -     Lipid Panel With / Chol / HDL Ratio    3. Screening for diabetes mellitus  -     Hemoglobin A1c    4. Screening for thyroid disorder  -     TSH Rfx On Abnormal To Free T4    5. Screening, anemia, deficiency, iron  -     CBC w AUTO Differential    6. Morbid obesity with BMI of 40.0-44.9, adult (HCC)        Fasting labs ordered.  Await  results.  Patient is agreeable to referral to gastroenterology if liver enzymes remain elevated.  She does have an appointment to get a routine screening colonoscopy with [facility].    Return in about 6 months (around 7/26/2023).       Patient was given instructions and counseling regarding his/her condition or for health maintenance advice. Please see specific information pulled into the AVS if appropriate.     I wore protective equipment throughout this patient encounter to include mask. Hand hygiene was performed before donning protective equipment and after removal when leaving the room.

## 2023-01-27 DIAGNOSIS — R79.89 ELEVATED LFTS: Primary | ICD-10-CM

## 2023-01-27 LAB
ALBUMIN SERPL-MCNC: 4.2 G/DL (ref 3.5–5.2)
ALBUMIN/GLOB SERPL: 1.6 G/DL
ALP SERPL-CCNC: 272 U/L (ref 39–117)
ALT SERPL W P-5'-P-CCNC: 73 U/L (ref 1–33)
ANION GAP SERPL CALCULATED.3IONS-SCNC: 13.9 MMOL/L (ref 5–15)
AST SERPL-CCNC: 68 U/L (ref 1–32)
BASOPHILS # BLD AUTO: 0.07 10*3/MM3 (ref 0–0.2)
BASOPHILS NFR BLD AUTO: 1.3 % (ref 0–1.5)
BILIRUB SERPL-MCNC: 0.6 MG/DL (ref 0–1.2)
BUN SERPL-MCNC: 15 MG/DL (ref 6–20)
BUN/CREAT SERPL: 18.8 (ref 7–25)
CALCIUM SPEC-SCNC: 10.2 MG/DL (ref 8.6–10.5)
CHLORIDE SERPL-SCNC: 105 MMOL/L (ref 98–107)
CHOLEST SERPL-MCNC: 200 MG/DL (ref 0–200)
CO2 SERPL-SCNC: 23.1 MMOL/L (ref 22–29)
CREAT SERPL-MCNC: 0.8 MG/DL (ref 0.57–1)
DEPRECATED RDW RBC AUTO: 38.9 FL (ref 37–54)
EGFRCR SERPLBLD CKD-EPI 2021: 87.7 ML/MIN/1.73
EOSINOPHIL # BLD AUTO: 0.1 10*3/MM3 (ref 0–0.4)
EOSINOPHIL NFR BLD AUTO: 1.9 % (ref 0.3–6.2)
ERYTHROCYTE [DISTWIDTH] IN BLOOD BY AUTOMATED COUNT: 12.4 % (ref 12.3–15.4)
GGT SERPL-CCNC: 316 U/L (ref 5–36)
GLOBULIN UR ELPH-MCNC: 2.7 GM/DL
GLUCOSE SERPL-MCNC: 97 MG/DL (ref 65–99)
HCT VFR BLD AUTO: 46.6 % (ref 34–46.6)
HDLC SERPL QL: 2.67
HDLC SERPL-MCNC: 75 MG/DL (ref 40–60)
HGB BLD-MCNC: 15.5 G/DL (ref 12–15.9)
IMM GRANULOCYTES # BLD AUTO: 0.01 10*3/MM3 (ref 0–0.05)
IMM GRANULOCYTES NFR BLD AUTO: 0.2 % (ref 0–0.5)
LDLC SERPL CALC-MCNC: 109 MG/DL (ref 0–100)
LYMPHOCYTES # BLD AUTO: 1.3 10*3/MM3 (ref 0.7–3.1)
LYMPHOCYTES NFR BLD AUTO: 24.3 % (ref 19.6–45.3)
MCH RBC QN AUTO: 28.6 PG (ref 26.6–33)
MCHC RBC AUTO-ENTMCNC: 33.3 G/DL (ref 31.5–35.7)
MCV RBC AUTO: 86 FL (ref 79–97)
MONOCYTES # BLD AUTO: 0.28 10*3/MM3 (ref 0.1–0.9)
MONOCYTES NFR BLD AUTO: 5.2 % (ref 5–12)
NEUTROPHILS NFR BLD AUTO: 3.58 10*3/MM3 (ref 1.7–7)
NEUTROPHILS NFR BLD AUTO: 67.1 % (ref 42.7–76)
NRBC BLD AUTO-RTO: 0 /100 WBC (ref 0–0.2)
PLATELET # BLD AUTO: 214 10*3/MM3 (ref 140–450)
PMV BLD AUTO: 12.1 FL (ref 6–12)
POTASSIUM SERPL-SCNC: 4.6 MMOL/L (ref 3.5–5.2)
PROT SERPL-MCNC: 6.9 G/DL (ref 6–8.5)
RBC # BLD AUTO: 5.42 10*6/MM3 (ref 3.77–5.28)
SODIUM SERPL-SCNC: 142 MMOL/L (ref 136–145)
TRIGL SERPL-MCNC: 91 MG/DL (ref 0–150)
TSH SERPL DL<=0.05 MIU/L-ACNC: 1.61 UIU/ML (ref 0.27–4.2)
VLDLC SERPL-MCNC: 16 MG/DL (ref 5–40)
WBC NRBC COR # BLD: 5.34 10*3/MM3 (ref 3.4–10.8)

## 2023-10-05 ENCOUNTER — APPOINTMENT (OUTPATIENT)
Dept: CT IMAGING | Facility: HOSPITAL | Age: 53
DRG: 871 | End: 2023-10-05
Payer: COMMERCIAL

## 2023-10-05 ENCOUNTER — HOSPITAL ENCOUNTER (INPATIENT)
Facility: HOSPITAL | Age: 53
LOS: 5 days | Discharge: HOME | DRG: 871 | End: 2023-10-10
Attending: EMERGENCY MEDICINE | Admitting: HOSPITALIST
Payer: COMMERCIAL

## 2023-10-05 ENCOUNTER — APPOINTMENT (OUTPATIENT)
Dept: GENERAL RADIOLOGY | Facility: HOSPITAL | Age: 53
DRG: 871 | End: 2023-10-05
Payer: COMMERCIAL

## 2023-10-05 DIAGNOSIS — R65.20 SEVERE SEPSIS: Primary | ICD-10-CM

## 2023-10-05 DIAGNOSIS — A41.9 SEVERE SEPSIS: Primary | ICD-10-CM

## 2023-10-05 DIAGNOSIS — R65.21 SEPTIC SHOCK: ICD-10-CM

## 2023-10-05 DIAGNOSIS — R10.11 RUQ ABDOMINAL PAIN: ICD-10-CM

## 2023-10-05 DIAGNOSIS — A41.9 SEPTIC SHOCK: ICD-10-CM

## 2023-10-05 DIAGNOSIS — R74.8 ELEVATED LIVER ENZYMES: ICD-10-CM

## 2023-10-05 PROBLEM — I10 HYPERTENSION: Status: ACTIVE | Noted: 2023-10-05

## 2023-10-05 LAB
ABO GROUP BLD: NORMAL
ALBUMIN SERPL-MCNC: 2.9 G/DL (ref 3.5–5.2)
ALBUMIN SERPL-MCNC: 3.1 G/DL (ref 3.5–5.2)
ALBUMIN/GLOB SERPL: 1.1 G/DL
ALBUMIN/GLOB SERPL: 1.1 G/DL
ALP SERPL-CCNC: 277 U/L (ref 39–117)
ALP SERPL-CCNC: 934 U/L (ref 39–117)
ALT SERPL W P-5'-P-CCNC: 141 U/L (ref 1–33)
ALT SERPL W P-5'-P-CCNC: 172 U/L (ref 1–33)
ANION GAP SERPL CALCULATED.3IONS-SCNC: 12 MMOL/L (ref 5–15)
ANION GAP SERPL CALCULATED.3IONS-SCNC: 18 MMOL/L (ref 5–15)
APTT PPP: 28 SECONDS (ref 24–31)
AST SERPL-CCNC: 107 U/L (ref 1–32)
AST SERPL-CCNC: 154 U/L (ref 1–32)
B PARAPERT DNA SPEC QL NAA+PROBE: NOT DETECTED
B PERT DNA SPEC QL NAA+PROBE: NOT DETECTED
BACTERIA UR QL AUTO: ABNORMAL /HPF
BASOPHILS # BLD AUTO: 0 10*3/MM3 (ref 0–0.2)
BASOPHILS NFR BLD AUTO: 0.4 % (ref 0–1.5)
BILIRUB SERPL-MCNC: 4.5 MG/DL (ref 0–1.2)
BILIRUB SERPL-MCNC: 6.9 MG/DL (ref 0–1.2)
BILIRUB UR QL STRIP: ABNORMAL
BLD GP AB SCN SERPL QL: NEGATIVE
BUN SERPL-MCNC: 22 MG/DL (ref 6–20)
BUN SERPL-MCNC: 28 MG/DL (ref 6–20)
BUN/CREAT SERPL: 15.4 (ref 7–25)
BUN/CREAT SERPL: 16.6 (ref 7–25)
C PNEUM DNA NPH QL NAA+NON-PROBE: NOT DETECTED
CALCIUM SPEC-SCNC: 9 MG/DL (ref 8.6–10.5)
CALCIUM SPEC-SCNC: 9.3 MG/DL (ref 8.6–10.5)
CHLORIDE SERPL-SCNC: 104 MMOL/L (ref 98–107)
CHLORIDE SERPL-SCNC: 105 MMOL/L (ref 98–107)
CLARITY UR: ABNORMAL
CO2 SERPL-SCNC: 17 MMOL/L (ref 22–29)
CO2 SERPL-SCNC: 22 MMOL/L (ref 22–29)
COLOR UR: YELLOW
CORTIS SERPL-MCNC: 60.9 MCG/DL
CREAT SERPL-MCNC: 1.43 MG/DL (ref 0.57–1)
CREAT SERPL-MCNC: 1.69 MG/DL (ref 0.57–1)
D-LACTATE SERPL-SCNC: 2.7 MMOL/L (ref 0.5–2)
D-LACTATE SERPL-SCNC: 5.1 MMOL/L (ref 0.3–2)
D-LACTATE SERPL-SCNC: 5.2 MMOL/L (ref 0.5–2)
DEPRECATED RDW RBC AUTO: 44.2 FL (ref 37–54)
EGFRCR SERPLBLD CKD-EPI 2021: 35.5 ML/MIN/1.73
EGFRCR SERPLBLD CKD-EPI 2021: 43.4 ML/MIN/1.73
EOSINOPHIL # BLD AUTO: 0 10*3/MM3 (ref 0–0.4)
EOSINOPHIL NFR BLD AUTO: 0 % (ref 0.3–6.2)
ERYTHROCYTE [DISTWIDTH] IN BLOOD BY AUTOMATED COUNT: 13.9 % (ref 12.3–15.4)
FLUAV SUBTYP SPEC NAA+PROBE: NOT DETECTED
FLUBV RNA ISLT QL NAA+PROBE: NOT DETECTED
GEN 5 2HR TROPONIN T REFLEX: 17 NG/L
GLOBULIN UR ELPH-MCNC: 2.7 GM/DL
GLOBULIN UR ELPH-MCNC: 2.9 GM/DL
GLUCOSE SERPL-MCNC: 89 MG/DL (ref 65–99)
GLUCOSE SERPL-MCNC: 99 MG/DL (ref 65–99)
GLUCOSE UR STRIP-MCNC: ABNORMAL MG/DL
HADV DNA SPEC NAA+PROBE: NOT DETECTED
HAV IGM SERPL QL IA: NORMAL
HBV CORE IGM SERPL QL IA: NORMAL
HBV SURFACE AG SERPL QL IA: NORMAL
HCOV 229E RNA SPEC QL NAA+PROBE: NOT DETECTED
HCOV HKU1 RNA SPEC QL NAA+PROBE: NOT DETECTED
HCOV NL63 RNA SPEC QL NAA+PROBE: NOT DETECTED
HCOV OC43 RNA SPEC QL NAA+PROBE: NOT DETECTED
HCT VFR BLD AUTO: 41.3 % (ref 34–46.6)
HCV AB SER DONR QL: NORMAL
HGB BLD-MCNC: 13.9 G/DL (ref 12–15.9)
HGB UR QL STRIP.AUTO: ABNORMAL
HMPV RNA NPH QL NAA+NON-PROBE: NOT DETECTED
HOLD SPECIMEN: NORMAL
HOLD SPECIMEN: NORMAL
HPIV1 RNA ISLT QL NAA+PROBE: NOT DETECTED
HPIV2 RNA SPEC QL NAA+PROBE: NOT DETECTED
HPIV3 RNA NPH QL NAA+PROBE: NOT DETECTED
HPIV4 P GENE NPH QL NAA+PROBE: NOT DETECTED
HYALINE CASTS UR QL AUTO: ABNORMAL /LPF
INR PPP: 1.33 (ref 0.93–1.1)
KETONES UR QL STRIP: ABNORMAL
LEUKOCYTE ESTERASE UR QL STRIP.AUTO: NEGATIVE
LIPASE SERPL-CCNC: 11 U/L (ref 13–60)
LYMPHOCYTES # BLD AUTO: 0.2 10*3/MM3 (ref 0.7–3.1)
LYMPHOCYTES NFR BLD AUTO: 2 % (ref 19.6–45.3)
M PNEUMO IGG SER IA-ACNC: NOT DETECTED
MAGNESIUM SERPL-MCNC: 1.4 MG/DL (ref 1.6–2.6)
MCH RBC QN AUTO: 29.1 PG (ref 26.6–33)
MCHC RBC AUTO-ENTMCNC: 33.6 G/DL (ref 31.5–35.7)
MCV RBC AUTO: 86.8 FL (ref 79–97)
MONOCYTES # BLD AUTO: 0.1 10*3/MM3 (ref 0.1–0.9)
MONOCYTES NFR BLD AUTO: 0.6 % (ref 5–12)
MRSA DNA SPEC QL NAA+PROBE: ABNORMAL
NEUTROPHILS NFR BLD AUTO: 11.1 10*3/MM3 (ref 1.7–7)
NEUTROPHILS NFR BLD AUTO: 97 % (ref 42.7–76)
NITRITE UR QL STRIP: NEGATIVE
NRBC BLD AUTO-RTO: 0.1 /100 WBC (ref 0–0.2)
PH UR STRIP.AUTO: 5.5 [PH] (ref 5–8)
PHOSPHATE SERPL-MCNC: 3.1 MG/DL (ref 2.5–4.5)
PLATELET # BLD AUTO: 146 10*3/MM3 (ref 140–450)
PMV BLD AUTO: 9.4 FL (ref 6–12)
POTASSIUM SERPL-SCNC: 3.3 MMOL/L (ref 3.5–5.2)
POTASSIUM SERPL-SCNC: 3.6 MMOL/L (ref 3.5–5.2)
PROCALCITONIN SERPL-MCNC: 24.09 NG/ML (ref 0–0.25)
PROT SERPL-MCNC: 5.6 G/DL (ref 6–8.5)
PROT SERPL-MCNC: 6 G/DL (ref 6–8.5)
PROT UR QL STRIP: ABNORMAL
PROTHROMBIN TIME: 14.2 SECONDS (ref 9.6–11.7)
RBC # BLD AUTO: 4.77 10*6/MM3 (ref 3.77–5.28)
RBC # UR STRIP: ABNORMAL /HPF
REF LAB TEST METHOD: ABNORMAL
RH BLD: NEGATIVE
RHINOVIRUS RNA SPEC NAA+PROBE: NOT DETECTED
RSV RNA NPH QL NAA+NON-PROBE: NOT DETECTED
SARS-COV-2 RNA NPH QL NAA+NON-PROBE: NOT DETECTED
SODIUM SERPL-SCNC: 139 MMOL/L (ref 136–145)
SODIUM SERPL-SCNC: 139 MMOL/L (ref 136–145)
SP GR UR STRIP: 1.02 (ref 1–1.03)
SQUAMOUS #/AREA URNS HPF: ABNORMAL /HPF
T&S EXPIRATION DATE: NORMAL
TROPONIN T DELTA: 4 NG/L
TROPONIN T SERPL HS-MCNC: 13 NG/L
UROBILINOGEN UR QL STRIP: ABNORMAL
VANCOMYCIN SERPL-MCNC: 12.9 MCG/ML (ref 5–40)
WBC # UR STRIP: ABNORMAL /HPF
WBC NRBC COR # BLD: 11.5 10*3/MM3 (ref 3.4–10.8)
WHOLE BLOOD HOLD COAG: NORMAL
WHOLE BLOOD HOLD SPECIMEN: NORMAL

## 2023-10-05 PROCEDURE — C1751 CATH, INF, PER/CENT/MIDLINE: HCPCS

## 2023-10-05 PROCEDURE — 86900 BLOOD TYPING SEROLOGIC ABO: CPT

## 2023-10-05 PROCEDURE — 71045 X-RAY EXAM CHEST 1 VIEW: CPT

## 2023-10-05 PROCEDURE — 83605 ASSAY OF LACTIC ACID: CPT

## 2023-10-05 PROCEDURE — 82533 TOTAL CORTISOL: CPT | Performed by: INTERNAL MEDICINE

## 2023-10-05 PROCEDURE — 80074 ACUTE HEPATITIS PANEL: CPT | Performed by: NURSE PRACTITIONER

## 2023-10-05 PROCEDURE — 87181 SC STD AGAR DILUTION PER AGT: CPT | Performed by: EMERGENCY MEDICINE

## 2023-10-05 PROCEDURE — P9612 CATHETERIZE FOR URINE SPEC: HCPCS

## 2023-10-05 PROCEDURE — 85610 PROTHROMBIN TIME: CPT | Performed by: EMERGENCY MEDICINE

## 2023-10-05 PROCEDURE — 02HV33Z INSERTION OF INFUSION DEVICE INTO SUPERIOR VENA CAVA, PERCUTANEOUS APPROACH: ICD-10-PCS | Performed by: NURSE PRACTITIONER

## 2023-10-05 PROCEDURE — 25810000003 LACTATED RINGERS SOLUTION

## 2023-10-05 PROCEDURE — 25010000002 ALBUMIN HUMAN 25% PER 50 ML: Performed by: NURSE PRACTITIONER

## 2023-10-05 PROCEDURE — 71250 CT THORAX DX C-: CPT

## 2023-10-05 PROCEDURE — 81001 URINALYSIS AUTO W/SCOPE: CPT | Performed by: EMERGENCY MEDICINE

## 2023-10-05 PROCEDURE — 86901 BLOOD TYPING SEROLOGIC RH(D): CPT

## 2023-10-05 PROCEDURE — 36415 COLL VENOUS BLD VENIPUNCTURE: CPT | Performed by: EMERGENCY MEDICINE

## 2023-10-05 PROCEDURE — 25010000002 HEPARIN (PORCINE) PER 1000 UNITS: Performed by: STUDENT IN AN ORGANIZED HEALTH CARE EDUCATION/TRAINING PROGRAM

## 2023-10-05 PROCEDURE — 0202U NFCT DS 22 TRGT SARS-COV-2: CPT | Performed by: NURSE PRACTITIONER

## 2023-10-05 PROCEDURE — 25010000002 VANCOMYCIN HCL IN NACL 1.5-0.9 GM/500ML-% SOLUTION: Performed by: EMERGENCY MEDICINE

## 2023-10-05 PROCEDURE — 85025 COMPLETE CBC W/AUTO DIFF WBC: CPT | Performed by: EMERGENCY MEDICINE

## 2023-10-05 PROCEDURE — 87040 BLOOD CULTURE FOR BACTERIA: CPT | Performed by: EMERGENCY MEDICINE

## 2023-10-05 PROCEDURE — 87150 DNA/RNA AMPLIFIED PROBE: CPT | Performed by: EMERGENCY MEDICINE

## 2023-10-05 PROCEDURE — 83605 ASSAY OF LACTIC ACID: CPT | Performed by: NURSE PRACTITIONER

## 2023-10-05 PROCEDURE — 80053 COMPREHEN METABOLIC PANEL: CPT | Performed by: NURSE PRACTITIONER

## 2023-10-05 PROCEDURE — 25810000003 LACTATED RINGERS PER 1000 ML: Performed by: STUDENT IN AN ORGANIZED HEALTH CARE EDUCATION/TRAINING PROGRAM

## 2023-10-05 PROCEDURE — 99285 EMERGENCY DEPT VISIT HI MDM: CPT

## 2023-10-05 PROCEDURE — 84145 PROCALCITONIN (PCT): CPT | Performed by: EMERGENCY MEDICINE

## 2023-10-05 PROCEDURE — P9047 ALBUMIN (HUMAN), 25%, 50ML: HCPCS | Performed by: NURSE PRACTITIONER

## 2023-10-05 PROCEDURE — 83735 ASSAY OF MAGNESIUM: CPT | Performed by: NURSE PRACTITIONER

## 2023-10-05 PROCEDURE — 80053 COMPREHEN METABOLIC PANEL: CPT | Performed by: EMERGENCY MEDICINE

## 2023-10-05 PROCEDURE — 93005 ELECTROCARDIOGRAM TRACING: CPT | Performed by: EMERGENCY MEDICINE

## 2023-10-05 PROCEDURE — 74176 CT ABD & PELVIS W/O CONTRAST: CPT

## 2023-10-05 PROCEDURE — 0 POTASSIUM CHLORIDE PER 2 MEQ: Performed by: STUDENT IN AN ORGANIZED HEALTH CARE EDUCATION/TRAINING PROGRAM

## 2023-10-05 PROCEDURE — 0 CEFEPIME PER 500 MG: Performed by: STUDENT IN AN ORGANIZED HEALTH CARE EDUCATION/TRAINING PROGRAM

## 2023-10-05 PROCEDURE — 87641 MR-STAPH DNA AMP PROBE: CPT | Performed by: NURSE PRACTITIONER

## 2023-10-05 PROCEDURE — 25810000003 SODIUM CHLORIDE 0.9 % SOLUTION: Performed by: EMERGENCY MEDICINE

## 2023-10-05 PROCEDURE — 83690 ASSAY OF LIPASE: CPT | Performed by: EMERGENCY MEDICINE

## 2023-10-05 PROCEDURE — 25010000002 METRONIDAZOLE 500 MG/100ML SOLUTION: Performed by: STUDENT IN AN ORGANIZED HEALTH CARE EDUCATION/TRAINING PROGRAM

## 2023-10-05 PROCEDURE — 86850 RBC ANTIBODY SCREEN: CPT

## 2023-10-05 PROCEDURE — 84100 ASSAY OF PHOSPHORUS: CPT | Performed by: NURSE PRACTITIONER

## 2023-10-05 PROCEDURE — 84484 ASSAY OF TROPONIN QUANT: CPT | Performed by: EMERGENCY MEDICINE

## 2023-10-05 PROCEDURE — 25010000002 PIPERACILLIN SOD-TAZOBACTAM PER 1 G: Performed by: EMERGENCY MEDICINE

## 2023-10-05 PROCEDURE — 85730 THROMBOPLASTIN TIME PARTIAL: CPT | Performed by: EMERGENCY MEDICINE

## 2023-10-05 PROCEDURE — 80202 ASSAY OF VANCOMYCIN: CPT | Performed by: STUDENT IN AN ORGANIZED HEALTH CARE EDUCATION/TRAINING PROGRAM

## 2023-10-05 PROCEDURE — 87186 SC STD MICRODIL/AGAR DIL: CPT | Performed by: EMERGENCY MEDICINE

## 2023-10-05 PROCEDURE — 25010000002 MAGNESIUM SULFATE 2 GM/50ML SOLUTION: Performed by: NURSE PRACTITIONER

## 2023-10-05 RX ORDER — SODIUM CHLORIDE 9 MG/ML
40 INJECTION, SOLUTION INTRAVENOUS AS NEEDED
Status: DISCONTINUED | OUTPATIENT
Start: 2023-10-05 | End: 2023-10-05 | Stop reason: SDUPTHER

## 2023-10-05 RX ORDER — MAGNESIUM SULFATE HEPTAHYDRATE 40 MG/ML
2 INJECTION, SOLUTION INTRAVENOUS
Status: COMPLETED | OUTPATIENT
Start: 2023-10-06 | End: 2023-10-05

## 2023-10-05 RX ORDER — AMOXICILLIN 250 MG
2 CAPSULE ORAL 2 TIMES DAILY
Status: DISCONTINUED | OUTPATIENT
Start: 2023-10-05 | End: 2023-10-06

## 2023-10-05 RX ORDER — SODIUM CHLORIDE 0.9 % (FLUSH) 0.9 %
10 SYRINGE (ML) INJECTION EVERY 12 HOURS SCHEDULED
Status: DISCONTINUED | OUTPATIENT
Start: 2023-10-05 | End: 2023-10-10 | Stop reason: HOSPADM

## 2023-10-05 RX ORDER — MIDODRINE HYDROCHLORIDE 5 MG/1
10 TABLET ORAL
Status: DISCONTINUED | OUTPATIENT
Start: 2023-10-05 | End: 2023-10-06

## 2023-10-05 RX ORDER — OXYCODONE HYDROCHLORIDE 5 MG/1
7.5 TABLET ORAL EVERY 4 HOURS PRN
Status: DISCONTINUED | OUTPATIENT
Start: 2023-10-05 | End: 2023-10-10 | Stop reason: HOSPADM

## 2023-10-05 RX ORDER — ONDANSETRON 2 MG/ML
4 INJECTION INTRAMUSCULAR; INTRAVENOUS EVERY 6 HOURS PRN
Status: DISCONTINUED | OUTPATIENT
Start: 2023-10-05 | End: 2023-10-10 | Stop reason: HOSPADM

## 2023-10-05 RX ORDER — SODIUM CHLORIDE 0.9 % (FLUSH) 0.9 %
10 SYRINGE (ML) INJECTION AS NEEDED
Status: DISCONTINUED | OUTPATIENT
Start: 2023-10-05 | End: 2023-10-10 | Stop reason: HOSPADM

## 2023-10-05 RX ORDER — ALBUMIN (HUMAN) 12.5 G/50ML
25 SOLUTION INTRAVENOUS ONCE
Status: COMPLETED | OUTPATIENT
Start: 2023-10-06 | End: 2023-10-06

## 2023-10-05 RX ORDER — POTASSIUM CHLORIDE 29.8 MG/ML
20 INJECTION INTRAVENOUS
Status: COMPLETED | OUTPATIENT
Start: 2023-10-06 | End: 2023-10-06

## 2023-10-05 RX ORDER — SODIUM CHLORIDE 0.9 % (FLUSH) 0.9 %
10 SYRINGE (ML) INJECTION EVERY 12 HOURS SCHEDULED
Status: DISCONTINUED | OUTPATIENT
Start: 2023-10-05 | End: 2023-10-05 | Stop reason: SDUPTHER

## 2023-10-05 RX ORDER — NOREPINEPHRINE BITARTRATE 0.03 MG/ML
.02-.3 INJECTION, SOLUTION INTRAVENOUS
Status: DISCONTINUED | OUTPATIENT
Start: 2023-10-05 | End: 2023-10-06

## 2023-10-05 RX ORDER — SODIUM CHLORIDE, SODIUM LACTATE, POTASSIUM CHLORIDE, CALCIUM CHLORIDE 600; 310; 30; 20 MG/100ML; MG/100ML; MG/100ML; MG/100ML
75 INJECTION, SOLUTION INTRAVENOUS CONTINUOUS
Status: DISCONTINUED | OUTPATIENT
Start: 2023-10-05 | End: 2023-10-08

## 2023-10-05 RX ORDER — URSODIOL 500 MG/1
500 TABLET, FILM COATED ORAL EVERY 12 HOURS SCHEDULED
Status: DISCONTINUED | OUTPATIENT
Start: 2023-10-05 | End: 2023-10-10 | Stop reason: HOSPADM

## 2023-10-05 RX ORDER — SODIUM CHLORIDE 9 MG/ML
40 INJECTION, SOLUTION INTRAVENOUS AS NEEDED
Status: DISCONTINUED | OUTPATIENT
Start: 2023-10-05 | End: 2023-10-10 | Stop reason: HOSPADM

## 2023-10-05 RX ORDER — POLYETHYLENE GLYCOL 3350 17 G/17G
17 POWDER, FOR SOLUTION ORAL DAILY PRN
Status: DISCONTINUED | OUTPATIENT
Start: 2023-10-05 | End: 2023-10-06

## 2023-10-05 RX ORDER — SODIUM CHLORIDE 0.9 % (FLUSH) 0.9 %
10 SYRINGE (ML) INJECTION AS NEEDED
Status: DISCONTINUED | OUTPATIENT
Start: 2023-10-05 | End: 2023-10-05 | Stop reason: SDUPTHER

## 2023-10-05 RX ORDER — METRONIDAZOLE 500 MG/100ML
500 INJECTION, SOLUTION INTRAVENOUS EVERY 8 HOURS
Status: DISCONTINUED | OUTPATIENT
Start: 2023-10-05 | End: 2023-10-06

## 2023-10-05 RX ORDER — HEPARIN SODIUM 5000 [USP'U]/ML
5000 INJECTION, SOLUTION INTRAVENOUS; SUBCUTANEOUS EVERY 12 HOURS SCHEDULED
Status: DISCONTINUED | OUTPATIENT
Start: 2023-10-05 | End: 2023-10-06

## 2023-10-05 RX ORDER — PANTOPRAZOLE SODIUM 40 MG/10ML
40 INJECTION, POWDER, LYOPHILIZED, FOR SOLUTION INTRAVENOUS
Status: DISCONTINUED | OUTPATIENT
Start: 2023-10-06 | End: 2023-10-10 | Stop reason: HOSPADM

## 2023-10-05 RX ORDER — VANCOMYCIN/0.9 % SOD CHLORIDE 1.5G/250ML
20 PLASTIC BAG, INJECTION (ML) INTRAVENOUS ONCE
Status: COMPLETED | OUTPATIENT
Start: 2023-10-05 | End: 2023-10-05

## 2023-10-05 RX ORDER — SODIUM CHLORIDE 0.9 % (FLUSH) 0.9 %
20 SYRINGE (ML) INJECTION AS NEEDED
Status: DISCONTINUED | OUTPATIENT
Start: 2023-10-05 | End: 2023-10-10 | Stop reason: HOSPADM

## 2023-10-05 RX ORDER — BISOPROLOL FUMARATE AND HYDROCHLOROTHIAZIDE 5; 6.25 MG/1; MG/1
1 TABLET ORAL DAILY
Status: DISCONTINUED | OUTPATIENT
Start: 2023-10-06 | End: 2023-10-05

## 2023-10-05 RX ORDER — ACETAMINOPHEN 650 MG/1
650 SUPPOSITORY RECTAL EVERY 4 HOURS PRN
Status: DISCONTINUED | OUTPATIENT
Start: 2023-10-05 | End: 2023-10-05

## 2023-10-05 RX ORDER — BISACODYL 5 MG/1
5 TABLET, DELAYED RELEASE ORAL DAILY PRN
Status: DISCONTINUED | OUTPATIENT
Start: 2023-10-05 | End: 2023-10-06

## 2023-10-05 RX ORDER — BISACODYL 10 MG
10 SUPPOSITORY, RECTAL RECTAL DAILY PRN
Status: DISCONTINUED | OUTPATIENT
Start: 2023-10-05 | End: 2023-10-06

## 2023-10-05 RX ORDER — ONDANSETRON 4 MG/1
4 TABLET, FILM COATED ORAL EVERY 6 HOURS PRN
Status: DISCONTINUED | OUTPATIENT
Start: 2023-10-05 | End: 2023-10-10 | Stop reason: HOSPADM

## 2023-10-05 RX ORDER — ACETAMINOPHEN 325 MG/1
650 TABLET ORAL EVERY 4 HOURS PRN
Status: DISCONTINUED | OUTPATIENT
Start: 2023-10-05 | End: 2023-10-05

## 2023-10-05 RX ORDER — TEMAZEPAM 15 MG/1
15 CAPSULE ORAL NIGHTLY PRN
Status: DISCONTINUED | OUTPATIENT
Start: 2023-10-05 | End: 2023-10-10 | Stop reason: HOSPADM

## 2023-10-05 RX ADMIN — POTASSIUM CHLORIDE 20 MEQ: 400 INJECTION, SOLUTION INTRAVENOUS at 23:41

## 2023-10-05 RX ADMIN — PIPERACILLIN AND TAZOBACTAM 4.5 G: 4; .5 INJECTION, POWDER, FOR SOLUTION INTRAVENOUS at 12:39

## 2023-10-05 RX ADMIN — MAGNESIUM SULFATE HEPTAHYDRATE 2 G: 2 INJECTION, SOLUTION INTRAVENOUS at 23:44

## 2023-10-05 RX ADMIN — METRONIDAZOLE 500 MG: 500 INJECTION, SOLUTION INTRAVENOUS at 21:56

## 2023-10-05 RX ADMIN — METRONIDAZOLE 500 MG: 500 INJECTION, SOLUTION INTRAVENOUS at 14:55

## 2023-10-05 RX ADMIN — SODIUM CHLORIDE, POTASSIUM CHLORIDE, SODIUM LACTATE AND CALCIUM CHLORIDE 1000 ML: 600; 310; 30; 20 INJECTION, SOLUTION INTRAVENOUS at 16:30

## 2023-10-05 RX ADMIN — POTASSIUM CHLORIDE 20 MEQ: 400 INJECTION, SOLUTION INTRAVENOUS at 23:42

## 2023-10-05 RX ADMIN — CEFEPIME 2000 MG: 2 INJECTION, POWDER, FOR SOLUTION INTRAVENOUS at 18:47

## 2023-10-05 RX ADMIN — SODIUM CHLORIDE, POTASSIUM CHLORIDE, SODIUM LACTATE AND CALCIUM CHLORIDE 150 ML/HR: 600; 310; 30; 20 INJECTION, SOLUTION INTRAVENOUS at 15:42

## 2023-10-05 RX ADMIN — Medication 1500 MG: at 13:19

## 2023-10-05 RX ADMIN — MIDODRINE HYDROCHLORIDE 10 MG: 5 TABLET ORAL at 15:35

## 2023-10-05 RX ADMIN — MAGNESIUM SULFATE HEPTAHYDRATE 2 G: 2 INJECTION, SOLUTION INTRAVENOUS at 23:45

## 2023-10-05 RX ADMIN — MAGNESIUM SULFATE HEPTAHYDRATE 2 G: 2 INJECTION, SOLUTION INTRAVENOUS at 23:46

## 2023-10-05 RX ADMIN — Medication 0.02 MCG/KG/MIN: at 16:25

## 2023-10-05 RX ADMIN — HEPARIN SODIUM 5000 UNITS: 5000 INJECTION INTRAVENOUS; SUBCUTANEOUS at 21:55

## 2023-10-05 RX ADMIN — Medication 10 ML: at 21:56

## 2023-10-05 RX ADMIN — ACETAMINOPHEN 650 MG: 650 SUPPOSITORY RECTAL at 18:41

## 2023-10-05 RX ADMIN — ALBUMIN (HUMAN) 25 G: 0.25 INJECTION, SOLUTION INTRAVENOUS at 23:41

## 2023-10-05 RX ADMIN — HEPARIN SODIUM 5000 UNITS: 5000 INJECTION INTRAVENOUS; SUBCUTANEOUS at 15:34

## 2023-10-05 NOTE — ED PROVIDER NOTES
Subjective   History of Present Illness  Patient is a 53-year-old female complaint abdominal pain diarrhea and nausea that started over the past 24 hours.  The pain is moderate and constant without radiation.  She denies coughing dysuria or other complaint    Review of Systems    Past Medical History:   Diagnosis Date    Hypertension     Obesity        No Known Allergies    Past Surgical History:   Procedure Laterality Date    CHOLECYSTECTOMY      DENTAL PROCEDURE         Family History   Problem Relation Age of Onset    Hypertension Mother     Hypertension Father     Heart disease Father     Stroke Father     Cancer Father         Prostate    Breast cancer Sister 47    Cancer Sister         Breast       Social History     Socioeconomic History    Marital status:    Tobacco Use    Smoking status: Never    Smokeless tobacco: Never   Vaping Use    Vaping Use: Never used   Substance and Sexual Activity    Alcohol use: Not Currently    Drug use: Never    Sexual activity: Defer           Objective   Physical Exam  Neck has no adenopathy JVD or bruits.  Lungs are clear.  Heart has regular rate rhythm without murmur rub or gallop.  Chest is nontender.  Abdomen is soft with mild diffuse tenderness.  Patient has normal bowel sounds without rebound or guarding.  Back has no CVA tenderness.  Extremities M is unremarkable.  Procedures     My EKG interpretation shows sinus tachycardia rate of 130 with no acute ST change      ED Course      Results for orders placed or performed during the hospital encounter of 10/05/23   Comprehensive Metabolic Panel    Specimen: Blood   Result Value Ref Range    Glucose 89 65 - 99 mg/dL    BUN 22 (H) 6 - 20 mg/dL    Creatinine 1.43 (H) 0.57 - 1.00 mg/dL    Sodium 139 136 - 145 mmol/L    Potassium 3.6 3.5 - 5.2 mmol/L    Chloride 104 98 - 107 mmol/L    CO2 17.0 (L) 22.0 - 29.0 mmol/L    Calcium 9.3 8.6 - 10.5 mg/dL    Total Protein 6.0 6.0 - 8.5 g/dL    Albumin 3.1 (L) 3.5 - 5.2 g/dL     ALT (SGPT) 172 (H) 1 - 33 U/L    AST (SGOT) 154 (H) 1 - 32 U/L    Alkaline Phosphatase 934 (H) 39 - 117 U/L    Total Bilirubin 6.9 (H) 0.0 - 1.2 mg/dL    Globulin 2.9 gm/dL    A/G Ratio 1.1 g/dL    BUN/Creatinine Ratio 15.4 7.0 - 25.0    Anion Gap 18.0 (H) 5.0 - 15.0 mmol/L    eGFR 43.4 (L) >60.0 mL/min/1.73   CBC Auto Differential    Specimen: Blood   Result Value Ref Range    WBC 11.50 (H) 3.40 - 10.80 10*3/mm3    RBC 4.77 3.77 - 5.28 10*6/mm3    Hemoglobin 13.9 12.0 - 15.9 g/dL    Hematocrit 41.3 34.0 - 46.6 %    MCV 86.8 79.0 - 97.0 fL    MCH 29.1 26.6 - 33.0 pg    MCHC 33.6 31.5 - 35.7 g/dL    RDW 13.9 12.3 - 15.4 %    RDW-SD 44.2 37.0 - 54.0 fl    MPV 9.4 6.0 - 12.0 fL    Platelets 146 140 - 450 10*3/mm3    Neutrophil % 97.0 (H) 42.7 - 76.0 %    Lymphocyte % 2.0 (L) 19.6 - 45.3 %    Monocyte % 0.6 (L) 5.0 - 12.0 %    Eosinophil % 0.0 (L) 0.3 - 6.2 %    Basophil % 0.4 0.0 - 1.5 %    Neutrophils, Absolute 11.10 (H) 1.70 - 7.00 10*3/mm3    Lymphocytes, Absolute 0.20 (L) 0.70 - 3.10 10*3/mm3    Monocytes, Absolute 0.10 0.10 - 0.90 10*3/mm3    Eosinophils, Absolute 0.00 0.00 - 0.40 10*3/mm3    Basophils, Absolute 0.00 0.00 - 0.20 10*3/mm3    nRBC 0.1 0.0 - 0.2 /100 WBC   Urinalysis With Microscopic If Indicated (No Culture) - Urine, Catheter    Specimen: Urine, Catheter   Result Value Ref Range    Color, UA Yellow Yellow, Straw    Appearance, UA Cloudy (A) Clear    pH, UA 5.5 5.0 - 8.0    Specific Gravity, UA 1.020 1.005 - 1.030    Glucose,  mg/dL (Trace) (A) Negative    Ketones, UA Trace (A) Negative    Bilirubin, UA Large (3+) (A) Negative    Blood, UA Small (1+) (A) Negative    Protein,  mg/dL (2+) (A) Negative    Leuk Esterase, UA Negative Negative    Nitrite, UA Negative Negative    Urobilinogen, UA 1.0 E.U./dL 0.2 - 1.0 E.U./dL   Lipase    Specimen: Blood   Result Value Ref Range    Lipase 11 (L) 13 - 60 U/L   Urinalysis, Microscopic Only - Urine, Catheter    Specimen: Urine, Catheter   Result  Value Ref Range    RBC, UA None Seen None Seen /HPF    WBC, UA 3-5 (A) None Seen /HPF    Bacteria, UA None Seen None Seen /HPF    Squamous Epithelial Cells, UA 21-30 (A) None Seen, 0-2 /HPF    Hyaline Casts, UA None Seen None Seen /LPF    Methodology Automated Microscopy    Protime-INR    Specimen: Blood   Result Value Ref Range    Protime 14.2 (H) 9.6 - 11.7 Seconds    INR 1.33 (H) 0.93 - 1.10   aPTT    Specimen: Blood   Result Value Ref Range    PTT 28.0 24.0 - 31.0 seconds   Procalcitonin    Specimen: Blood   Result Value Ref Range    Procalcitonin 24.09 (H) 0.00 - 0.25 ng/mL   High Sensitivity Troponin T    Specimen: Blood   Result Value Ref Range    HS Troponin T 13 (H) <10 ng/L   POC Lactate    Specimen: Blood   Result Value Ref Range    Lactate 5.1 (C) 0.3 - 2.0 mmol/L   ECG 12 Lead Other; simple sepsis trigger   Result Value Ref Range    QT Interval 285 ms    QTC Interval 428 ms   Type & Screen    Specimen: Blood   Result Value Ref Range    ABO Type O     RH type Negative     Antibody Screen Negative     T&S Expiration Date 10/8/2023 11:59:59 PM    Green Top (Gel)   Result Value Ref Range    Extra Tube Hold for add-ons.    Lavender Top   Result Value Ref Range    Extra Tube hold for add-on    Gold Top - SST   Result Value Ref Range    Extra Tube Hold for add-ons.    Light Blue Top   Result Value Ref Range    Extra Tube Hold for add-ons.      CT Abdomen Pelvis Without Contrast    Result Date: 10/5/2023  Impression: 1. No acute abnormality in the abdomen or pelvis. 2. Colonic diverticulosis without diverticulitis. 3. Prior cholecystectomy. Electronically Signed: Nasir Awad MD  10/5/2023 12:14 PM EDT  Workstation ID: [id number]    XR Chest 1 View    Result Date: 10/5/2023  Impression: 1. No acute cardiopulmonary disease. Electronically Signed: Micheal Lua MD  10/5/2023 12:45 PM EDT  Workstation ID: [id number]        SEPTIC SHOCK FOCUSED EXAM ATTESTATION    I attest that I have reassessed tissue perfusion after  the fluid bolus given.    Brice Childress MD  10/05/23  13:49 EDT                                 Medical Decision Making  Interpretation patient CT scan abdomen pelvis without contrast shows no obstructive change perforation or other abnormality.  My chest interpretation is no cardiac effusion or infiltrate.  UA is normal.  Patient does have leukocytosis 11.5 with no shift.  Lactate was greater than 5.  Metabolic shows mild renal insufficiency with no acute electrolyte abnormality.  LFTs are mildly elevated.  Patient did trigger severe sepsis secondary to her lactate.  She was given 30 cc/kg fluid bolus and given empiric antibiotics with blood cultures obtained.  Patient will be admitted for further evaluation.  I did speak the on-call hospitalist.  Total critical care time 40 minutes    Amount and/or Complexity of Data Reviewed  Labs: ordered. Decision-making details documented in ED Course.  Radiology: ordered and independent interpretation performed.  ECG/medicine tests: ordered and independent interpretation performed.    Risk  Prescription drug management.  Decision regarding hospitalization.        Final diagnoses:   Severe sepsis       ED Disposition  ED Disposition       ED Disposition   Decision to Admit    Condition   --    Comment   --               No follow-up provider specified.       Medication List        Changed      azelastine 0.1 % nasal spray  Commonly known as: ASTELIN  instill 2 sprays into the nostril(s) as directed by provider 2 (Two) Times a Day  What changed:   when to take this  reasons to take this                 Brice Childress MD  10/05/23 1276

## 2023-10-05 NOTE — CASE MANAGEMENT/SOCIAL WORK
Discharge Planning Assessment   [place]     Patient Name: Idalia Oliver  MRN: [id number]  Today's Date: 10/5/2023    Admit Date: 10/5/2023    Plan: Return home   Discharge Needs Assessment       Row Name 10/05/23 1554       Living Environment    People in Home spouse    Name(s) of People in Home Izaiah-spouse    Current Living Arrangements home    Potentially Unsafe Housing Conditions none    Primary Care Provided by self    Provides Primary Care For no one    Family Caregiver if Needed spouse    Quality of Family Relationships helpful;involved;supportive    Able to Return to Prior Arrangements yes       Resource/Environmental Concerns    Transportation Concerns none       Transition Planning    Patient/Family Anticipated Services at Transition none    Transportation Anticipated family or friend will provide  spouse       Discharge Needs Assessment    Readmission Within the Last 30 Days no previous admission in last 30 days    Equipment Currently Used at Home none    Concerns to be Addressed no discharge needs identified    Equipment Needed After Discharge none                   Discharge Plan       Row Name 10/05/23 1556       Plan    Plan Return home    Plan Comments Spoke with patient and spouse at bedside, IADL's, Confirmed PCP and Pharmacy, denies transportatin or medication coverage issues. Await consults.                  Continued Care and Services - Admitted Since 10/5/2023    Coordination has not been started for this encounter.          Demographic Summary       Row Name 10/05/23 1555       General Information    Admission Type inpatient    Arrived From home    Referral Source patient;family    Reason for Consult discharge planning    Preferred Language English                   Functional Status       Row Name 10/05/23 1557       Functional Status    Usual Activity Tolerance good    Current Activity Tolerance good       Functional Status, IADL    Medications independent    Meal Preparation independent     Housekeeping independent    Laundry independent    Shopping independent       Mental Status    General Appearance WDL WDL           Met with patient in room wearing PPE: mask, face shield/goggles, gloves, gown.      Maintained distance greater than six feet and spent less than 15 minutes in the room.     Erlinda Cline RN

## 2023-10-05 NOTE — ED NOTES
Nursing report ED to floor  Idalia Oliver  53 y.o.  female    HPI:   Chief Complaint   Patient presents with    Abdominal Pain       Admitting doctor:   Harry Soliman MD    Admitting diagnosis:   The encounter diagnosis was Severe sepsis.    Code status:   Current Code Status       Date Active Code Status Order ID Comments User Context       10/5/2023 1355 CPR (Attempt to Resuscitate) [id number]  Harry Soliman MD ED        Question Answer    Code Status (Patient has no pulse and is not breathing) CPR (Attempt to Resuscitate)    Medical Interventions (Patient has pulse or is breathing) Full Support    Level Of Support Discussed With Patient                    Allergies:   Patient has no known allergies.    Isolation:  No active isolations     Fall Risk:  Fall Risk Assessment was completed, and patient is at low risk for falls.   Predictive Model Details         11 (Low) Factor Value    Calculated 10/5/2023 14:07 Age 53    Risk of Fall Model Musculoskeletal Assessment WDL     Active Peripheral IV Present     Imaging order in this encounter Present     Respiratory Rate 26     Skin Assessment WDL     Total Bilirubin 6.9 mg/dL     Magnesium not on file     Albumin 3.1 g/dL     Number of Distinct Medication Classes administered 3     Drug Use No     Christiano Scale not on file     Creatinine 1.43 mg/dL     Diastolic BP 74     Financial Class Private Insurance      U/L     Peripheral Vascular Assessment WDL     Cardiac Assessment X     Gastrointestinal Assessment WDL     Calcium 9.3 mg/dL     Days after Admission 0.133     Chloride 104 mmol/L     Potassium 3.6 mmol/L         Weight:       10/05/23  1053   Weight: 110 kg (243 lb)       Intake and Output  No intake or output data in the 24 hours ending 10/05/23 1714    Diet:   Dietary Orders (From admission, onward)       Start     Ordered    10/05/23 1356  Diet: Regular/House Diet; Texture: Regular Texture (IDDSI 7); Fluid Consistency: Thin (IDDSI 0)  Diet Effective Now         References:    Diet Order [place]   Question Answer Comment   Diets: Regular/House Diet    Texture: Regular Texture (IDDSI 7)    Fluid Consistency: Thin (IDDSI 0)        10/05/23 1355                     Most recent vitals:   Vitals:    10/05/23 1546 10/05/23 1600 10/05/23 1615 10/05/23 1650   BP: (!) 83/38 (!) 86/40 (!) 85/40 92/49   BP Location:       Patient Position:       Pulse: (!) 130 (!) 129 (!) 128 (!) 122   Resp:       Temp:       TempSrc:       SpO2: 95% 94% 94% (!) 0%   Weight:       Height:           Active LDAs/IV Access:   Lines, Drains & Airways       Active LDAs       Name Placement date Placement time Site Days    Peripheral IV 10/05/23 1125 Right Antecubital 10/05/23  1125  Antecubital  less than 1    Peripheral IV 10/05/23 1126 Left Antecubital 10/05/23  1126  Antecubital  less than 1                    Skin Condition:   Skin Assessments (last day)       None             Labs (abnormal labs have a star):   Labs Reviewed   COMPREHENSIVE METABOLIC PANEL - Abnormal; Notable for the following components:       Result Value    BUN 22 (*)     Creatinine 1.43 (*)     CO2 17.0 (*)     Albumin 3.1 (*)     ALT (SGPT) 172 (*)     AST (SGOT) 154 (*)     Alkaline Phosphatase 934 (*)     Total Bilirubin 6.9 (*)     Anion Gap 18.0 (*)     eGFR 43.4 (*)     All other components within normal limits    Narrative:     GFR Normal >60  Chronic Kidney Disease <60  Kidney Failure <15     CBC WITH AUTO DIFFERENTIAL - Abnormal; Notable for the following components:    WBC 11.50 (*)     Neutrophil % 97.0 (*)     Lymphocyte % 2.0 (*)     Monocyte % 0.6 (*)     Eosinophil % 0.0 (*)     Neutrophils, Absolute 11.10 (*)     Lymphocytes, Absolute 0.20 (*)     All other components within normal limits   URINALYSIS W/ MICROSCOPIC IF INDICATED (NO CULTURE) - Abnormal; Notable for the following components:    Appearance, UA Cloudy (*)     Glucose,  mg/dL (Trace) (*)     Ketones, UA Trace (*)     Bilirubin, UA Large (3+)  "(*)     Blood, UA Small (1+) (*)     Protein,  mg/dL (2+) (*)     All other components within normal limits   LIPASE - Abnormal; Notable for the following components:    Lipase 11 (*)     All other components within normal limits   LACTIC ACID, REFLEX - Abnormal; Notable for the following components:    Lactate 5.2 (*)     All other components within normal limits   URINALYSIS, MICROSCOPIC ONLY - Abnormal; Notable for the following components:    WBC, UA 3-5 (*)     Squamous Epithelial Cells, UA 21-30 (*)     All other components within normal limits   PROTIME-INR - Abnormal; Notable for the following components:    Protime 14.2 (*)     INR 1.33 (*)     All other components within normal limits   PROCALCITONIN - Abnormal; Notable for the following components:    Procalcitonin 24.09 (*)     All other components within normal limits    Narrative:     As a Marker for Sepsis (Non-Neonates):    1. <0.5 ng/mL represents a low risk of severe sepsis and/or septic shock.  2. >2 ng/mL represents a high risk of severe sepsis and/or septic shock.    As a Marker for Lower Respiratory Tract Infections that require antibiotic therapy:    PCT on Admission    Antibiotic Therapy       6-12 Hrs later    >0.5                Strongly Recommended  >0.25 - <0.5        Recommended   0.1 - 0.25          Discouraged              Remeasure/reassess PCT  <0.1                Strongly Discouraged     Remeasure/reassess PCT    As 28 day mortality risk marker: \"Change in Procalcitonin Result\" (>80% or <=80%) if Day 0 (or Day 1) and Day 4 values are available. Refer to http://www.EVRSTs-pct-calculator.com    Change in PCT <=80%  A decrease of PCT levels below or equal to 80% defines a positive change in PCT test result representing a higher risk for 28-day all-cause mortality of patients diagnosed with severe sepsis for septic shock.    Change in PCT >80%  A decrease of PCT levels of more than 80% defines a negative change in PCT result " representing a lower risk for 28-day all-cause mortality of patients diagnosed with severe sepsis or septic shock.      TROPONIN - Abnormal; Notable for the following components:    HS Troponin T 13 (*)     All other components within normal limits    Narrative:     High Sensitive Troponin T Reference Range:  <10.0 ng/L- Negative Female for AMI  <15.0 ng/L- Negative Male for AMI  >=10 - Abnormal Female indicating possible myocardial injury.  >=15 - Abnormal Male indicating possible myocardial injury.   Clinicians would have to utilize clinical acumen, EKG, Troponin, and serial changes to determine if it is an Acute Myocardial Infarction or myocardial injury due to an underlying chronic condition.        HIGH SENSITIVITIY TROPONIN T 2HR - Abnormal; Notable for the following components:    HS Troponin T 17 (*)     Troponin T Delta 4 (*)     All other components within normal limits    Narrative:     High Sensitive Troponin T Reference Range:  <10.0 ng/L- Negative Female for AMI  <15.0 ng/L- Negative Male for AMI  >=10 - Abnormal Female indicating possible myocardial injury.  >=15 - Abnormal Male indicating possible myocardial injury.   Clinicians would have to utilize clinical acumen, EKG, Troponin, and serial changes to determine if it is an Acute Myocardial Infarction or myocardial injury due to an underlying chronic condition.        POC LACTATE - Abnormal; Notable for the following components:    Lactate 5.1 (*)     All other components within normal limits   APTT - Normal   VANCOMYCIN, RANDOM - Normal    Narrative:     Therapeutic Ranges for Vancomycin    Vancomycin Random   5.0-40.0 mcg/mL  Vancomycin Trough   5.0-20.0 mcg/mL  Vancomycin Peak     20.0-40.0 mcg/mL   BLOOD CULTURE   BLOOD CULTURE   CLOSTRIDIOIDES DIFFICILE TOXIN    Narrative:     The following orders were created for panel order Clostridioides difficile Toxin - Stool, Per Rectum.  Procedure                               Abnormality         Status                      ---------                               -----------         ------                     Clostridioides difficile...[[id number]]                                                   Please view results for these tests on the individual orders.   GASTROINTESTINAL PANEL, PCR (PREFERRED) DOES NOT INCLUDE CDIFF   CLOSTRIDIOIDES DIFFICILE EIA   RAINBOW DRAW    Narrative:     The following orders were created for panel order [place] Draw.  Procedure                               Abnormality         Status                     ---------                               -----------         ------                     Green Top (Gel)[[id number]]                                  Final result               Lavender Top[[id number]]                                     Final result               Gold Top - SST[[id number]]                                   Final result               Light Blue Top[[id number]]                                   Final result                 Please view results for these tests on the individual orders.   CORTISOL    Narrative:     Cortisol Reference Ranges:    Cortisol 6AM - 10AM Range: 6.02-18.40 mcg/dl  Cortisol 4PM - 8PM Range: 2.68-10.50 mcg/dl      Results may be falsely increased if patient taking Biotin.     BASIC METABOLIC PANEL   VANCOMYCIN, RANDOM   POC LACTATE   POC LACTATE   TYPE AND SCREEN   BB ARMBAND CHECK   CBC AND DIFFERENTIAL    Narrative:     The following orders were created for panel order CBC & Differential.  Procedure                               Abnormality         Status                     ---------                               -----------         ------                     CBC Auto Differential[[id number]]        Abnormal            Final result                 Please view results for these tests on the individual orders.   GREEN TOP   LAVENDER TOP   GOLD TOP - SST   LIGHT BLUE TOP       LOC: Person, Place, Time, and Situation    Telemetry:  Critical Care    Cardiac  Monitoring Ordered: yes    EKG:   ECG 12 Lead Other; simple sepsis trigger   Preliminary Result   HEART RATE= 136  bpm   RR Interval= 444  ms   OH Interval= 136  ms   P Horizontal Axis= -10  deg   P Front Axis= 61  deg   QRSD Interval= 67  ms   QT Interval= 285  ms   QTcB= 428  ms   QRS Axis= -35  deg   T Wave Axis= 34  deg   - ABNORMAL ECG -   Sinus tachycardia   Left atrial enlargement   Left axis deviation   Abnrm R prog, consider ASMI or lead placement   Electronically Signed By:    Date and Time of Study: 2023-10-05 11:13:09      ECG 12 Lead Other; Sepsis    (Results Pending)       Medications Given in the ED:   Medications   sodium chloride 0.9 % flush 10 mL (has no administration in time range)   sodium chloride 0.9 % flush 10 mL (has no administration in time range)   metroNIDAZOLE (FLAGYL) IVPB 500 mg (0 mg Intravenous Stopped 10/5/23 1613)   Pharmacy to dose vancomycin (has no administration in time range)   Pharmacy to Dose Cefepime (has no administration in time range)   ursodiol (ACTIGALL) tablet 500 mg (has no administration in time range)   sodium chloride 0.9 % flush 10 mL (10 mL Intravenous Not Given 10/5/23 1543)   sodium chloride 0.9 % flush 10 mL (has no administration in time range)   sodium chloride 0.9 % infusion 40 mL (has no administration in time range)   sennosides-docusate (PERICOLACE) 8.6-50 MG per tablet 2 tablet (has no administration in time range)     And   polyethylene glycol (MIRALAX) packet 17 g (has no administration in time range)     And   bisacodyl (DULCOLAX) EC tablet 5 mg (has no administration in time range)     And   bisacodyl (DULCOLAX) suppository 10 mg (has no administration in time range)   heparin (porcine) 5000 UNIT/ML injection 5,000 Units (5,000 Units Subcutaneous Given 10/5/23 1534)   Potassium Replacement - Follow Nurse / BPA Driven Protocol (has no administration in time range)   Magnesium Standard Dose Replacement - Follow Nurse / BPA Driven Protocol (has no  administration in time range)   Phosphorus Replacement - Follow Nurse / BPA Driven Protocol (has no administration in time range)   Calcium Replacement - Follow Nurse / BPA Driven Protocol (has no administration in time range)   acetaminophen (TYLENOL) tablet 650 mg (has no administration in time range)   oxyCODONE (ROXICODONE) immediate release tablet 7.5 mg (has no administration in time range)   temazepam (RESTORIL) capsule 15 mg (has no administration in time range)   ondansetron (ZOFRAN) tablet 4 mg (has no administration in time range)     Or   ondansetron (ZOFRAN) injection 4 mg (has no administration in time range)   pantoprazole (PROTONIX) injection 40 mg (has no administration in time range)   cefepime 2 gm IVPB in 100 ml NS (MBP) (has no administration in time range)   cefepime 2 gm IVPB in 100 ml NS (MBP) (has no administration in time range)   Vancomycin Pharmacy Intermittent/Pulse Dosing (has no administration in time range)   midodrine (PROAMATINE) tablet 10 mg (10 mg Oral Given 10/5/23 1535)   lactated ringers infusion (150 mL/hr Intravenous New Bag 10/5/23 1542)   norepinephrine (LEVOPHED) 8 mg in 250 mL NS infusion (premix) (0.04 mcg/kg/min x 110 kg Intravenous Rate/Dose Change 10/5/23 1642)   sodium chloride 0.9% - IBW for BMI > 30 bolus 1,572 mL (0 mL Intravenous Stopped 10/5/23 1319)   piperacillin-tazobactam (ZOSYN) IVPB 4.5 g in 100 mL NS (CD) (0 g Intravenous Stopped 10/5/23 1319)   vancomycin IVPB 1500 mg in 0.9% NaCl (Premix) 500 mL (0 mg Intravenous Stopped 10/5/23 1455)   lactated ringers bolus 1,000 mL (1,000 mL Intravenous New Bag 10/5/23 1630)       Imaging results:  CT Abdomen Pelvis Without Contrast    Result Date: 10/5/2023  Impression: 1. No acute abnormality in the abdomen or pelvis. 2. Colonic diverticulosis without diverticulitis. 3. Prior cholecystectomy. Electronically Signed: Nasir Awad MD  10/5/2023 12:14 PM EDT  Workstation ID: [id number]    CT Chest Without Contrast  Diagnostic    Result Date: 10/5/2023  Impression: No acute process identified. Electronically Signed: oJshua Butts MD  10/5/2023 1:20 PM CDT  Workstation ID: [id number]    XR Chest 1 View    Result Date: 10/5/2023  Impression: 1. No acute cardiopulmonary disease. Electronically Signed: Micheal Lua MD  10/5/2023 12:45 PM EDT  Workstation ID: [id number]     Social issues:   Social History     Socioeconomic History    Marital status:    Tobacco Use    Smoking status: Never    Smokeless tobacco: Never   Vaping Use    Vaping Use: Never used   Substance and Sexual Activity    Alcohol use: Not Currently    Drug use: Never    Sexual activity: Defer       NIH Stroke Scale:  Interval: (not recorded)  1a. Level of Consciousness: (not recorded)  1b. LOC Questions: (not recorded)  1c. LOC Commands: (not recorded)  2. Best Gaze: (not recorded)  3. Visual: (not recorded)  4. Facial Palsy: (not recorded)  5a. Motor Arm, Left: (not recorded)  5b. Motor Arm, Right: (not recorded)  6a. Motor Leg, Left: (not recorded)  6b. Motor Leg, Right: (not recorded)  7. Limb Ataxia: (not recorded)  8. Sensory: (not recorded)  9. Best Language: (not recorded)  10. Dysarthria: (not recorded)  11. Extinction and Inattention (formerly Neglect): (not recorded)    Total (NIH Stroke Scale): (not recorded)     Additional notable assessment information:     Nursing report ED to floor:  CHRIS Rainey RN   10/05/23 17:14 EDT

## 2023-10-05 NOTE — LETTER
October 9, 2023     Patient: Idalia Oliver   YOB: 1970   Date of Visit: 10/5/2023       To Whom It May Concern:    It is my medical opinion that Idalia Oliver should remain out of work until 10/25 , to finish their course of medical treatment. If you any questions or concerns please feel free to reach out.              Sincerely,    Constance Townsend LPN

## 2023-10-05 NOTE — H&P
Critical Care History and Physical     Idalia Oliver : 1970 MRN:[id number] LOS:0 ROOM: 2301/1     Reason for admission: Sepsis     Assessment / Plan     Septic Shock: ( WBC>12 or <4 or >10% bands, Temp > 100.4 or < 96.8, HR>90, Lactic acid>2, and MAP<65 or SBP<90 )  -Etiology unclear, differentials include pancreatitis or liver disease, ?VIGIL/ Hepatitis  -Blood cultures pending  -UA negative  -RVP pending  -MRSA swab pending  -Started on Levophed  -Persistent hypotension in spite of adequate fluid resuscitation  -C/w additional fluids as needed. Avoid fluid overload  -Titrate vasopressors for a target MAP of 65  -Continue cefepime and flagyl    Elevated LFTs  -  -  -Liver US pending  -Hepatitis panel pending    Essential hypertension  -Currently hypotensive, requiring Levophed  -Resume home antihypertensives when clinically appropriate    Obesity  -BMI 43.05 kg  -Counseled on lifestyle modifications       Level Of Support Discussed With: Patient  Code Status (Patient has no pulse and is not breathing): CPR (Attempt to Resuscitate)  Medical Interventions (Patient has pulse or is breathing): Full Support       Nutrition:   Diet: Regular/House Diet; Texture: Regular Texture (IDDSI 7); Fluid Consistency: Thin (IDDSI 0)     DVT prophylaxis:  Medical DVT prophylaxis orders are present.     History of Present illness     Idalia Oliver is a 53 y.o. female with PMH of hypertension, obesity presented to the hospital for abdominal pain, diarrhea and nausea x24 hours, and was admitted with a principal diagnosis of Sepsis.  Patient stated that the pain was moderate and constant, however without radiation.  Patient denies chest pain, shortness of air, palpitations, dizziness or syncope, headache, chills, or fever.  Denies vomiting, constipation, loss of weight or loss of appetite, dysuria, blood in urine or stool.    ED course: CT abdomen and pelvis without contrast showed no obstructive change or perforation.   Patient received sepsis fluid bolus in emergency room and given empiric antibiotics after blood cultures were obtained.  Initially she was admitted to the hospitalist group, however remained hypotensive after fluid bolus therefore was transferred to ICU for Levophed gtt. and further work-up.    ACP: Patient does not have advanced directive on file at this facility.  She is alert and orient x4 declares herself full code.    Patient was seen and examined on 10/05/23 at 19:46 EDT .    Subjective / Review of systems     Review of Systems   Constitutional:  Negative for chills and fever.   HENT:  Negative for congestion and sore throat.    Respiratory:  Negative for cough and shortness of breath.    Cardiovascular:  Negative for chest pain and palpitations.   Gastrointestinal:  Positive for abdominal pain. Negative for nausea.   Endocrine: Negative for heat intolerance and polyuria.   Genitourinary:  Negative for dysuria and urgency.   Musculoskeletal:  Negative for arthralgias and myalgias.   Skin:  Negative for rash and wound.   Neurological:  Negative for weakness and numbness.   Psychiatric/Behavioral:  Negative for suicidal ideas. The patient is not nervous/anxious.       Past Medical/Surgical/Social/Family History & Allergies     Past Medical History:   Diagnosis Date    Hypertension     Obesity       Past Surgical History:   Procedure Laterality Date    CHOLECYSTECTOMY      DENTAL PROCEDURE        Social History     Socioeconomic History    Marital status:    Tobacco Use    Smoking status: Never    Smokeless tobacco: Never   Vaping Use    Vaping Use: Never used   Substance and Sexual Activity    Alcohol use: Not Currently    Drug use: Never    Sexual activity: Defer      Family History   Problem Relation Age of Onset    Hypertension Mother     Hypertension Father     Heart disease Father     Stroke Father     Cancer Father         Prostate    Breast cancer Sister 47    Cancer Sister         Breast      No  Known Allergies   Social Determinants of Health     Tobacco Use: Low Risk     Smoking Tobacco Use: Never    Smokeless Tobacco Use: Never    Passive Exposure: Not on file   Alcohol Use: Not on file   Financial Resource Strain: Not on file   Food Insecurity: Not on file   Transportation Needs: Not on file   Physical Activity: Not on file   Stress: Not on file   Social Connections: Not on file   Intimate Partner Violence: Not on file   Depression: Not on file   Housing Stability: Not on file        Home Medications     Prior to Admission medications    Medication Sig Start Date End Date Taking? Authorizing Provider   bisoprolol-hydrochlorothiazide (ZIAC) 5-6.25 MG per tablet Take 1 tablet by mouth Daily. 1/23/23  Yes Leonila Horan APRN   Cholecalciferol (VITAMIN D3 PO) Take 3,000 Units by mouth Daily.   Yes ProviderGloria MD   amoxicillin (AMOXIL) 875 MG tablet 1 tablet Orally Twice a day 7 days 9/14/23 10/5/23     azelastine (ASTELIN) 0.1 % nasal spray instill 2 sprays into the nostril(s) as directed by provider 2 (Two) Times a Day  Patient taking differently: 2 sprays into the nostril(s) as directed by provider As Needed. 2/22/22 10/5/23  Ericka Peñaloza MD   bisoprolol-hydrochlorothiazide (ZIAC) 5-6.25 MG per tablet Take 1 tablet by mouth Daily. 8/11/23 10/5/23     multivitamin with minerals tablet tablet Take 1 tablet by mouth Daily.  10/5/23  ProviderGloria MD        Objective / Physical Exam     Vital signs:  Temp: (!) 103.7 øF (39.8 øC) (Notified nurse and provider.)  BP: 95/51  Heart Rate: 119  Resp: 26  SpO2: 95 %  Weight: 110 kg (243 lb)    Admission Weight: Weight: 110 kg (243 lb)    Physical Exam  Constitutional:       Appearance: Normal appearance. She is obese.   HENT:      Head: Normocephalic.      Nose: Nose normal.      Mouth/Throat:      Mouth: Mucous membranes are moist.   Eyes:      Extraocular Movements: Extraocular movements intact.      Pupils: Pupils are equal, round, and  reactive to light.   Cardiovascular:      Rate and Rhythm: Normal rate and regular rhythm.      Pulses: Normal pulses.      Heart sounds: Normal heart sounds.   Pulmonary:      Effort: Pulmonary effort is normal.      Breath sounds: Normal breath sounds.   Abdominal:      General: Abdomen is flat. Bowel sounds are normal.      Palpations: Abdomen is soft.   Musculoskeletal:         General: Normal range of motion.   Skin:     General: Skin is warm and dry.      Capillary Refill: Capillary refill takes 2 to 3 seconds.      Coloration: Skin is jaundiced.   Neurological:      General: No focal deficit present.      Mental Status: She is alert.   Psychiatric:         Mood and Affect: Mood normal.         Behavior: Behavior normal.        Labs     Results from last 7 days   Lab Units 10/05/23  1106   WBC 10*3/mm3 11.50*   HEMATOCRIT % 41.3   PLATELETS 10*3/mm3 146      Results from last 7 days   Lab Units 10/05/23  1106   SODIUM mmol/L 139   POTASSIUM mmol/L 3.6   CHLORIDE mmol/L 104   CO2 mmol/L 17.0*   BUN mg/dL 22*   CREATININE mg/dL 1.43*        Imaging     Chest X ray: My independent assessment showed no infiltrates or effusions    EKG: My independent evaluation showed sinus tachycardia, no ST -T changes    Current Medications     Scheduled Meds:  [START ON 10/6/2023] cefepime, 2,000 mg, Intravenous, Q12H  heparin (porcine), 5,000 Units, Subcutaneous, Q12H  metroNIDAZOLE, 500 mg, Intravenous, Q8H  midodrine, 10 mg, Oral, TID AC  [START ON 10/6/2023] pantoprazole, 40 mg, Intravenous, Q AM  senna-docusate sodium, 2 tablet, Oral, BID  sodium chloride, 10 mL, Intravenous, Q12H  ursodiol, 500 mg, Oral, Q12H         Continuous Infusions:  lactated ringers, 150 mL/hr, Last Rate: 150 mL/hr (10/05/23 1542)  norepinephrine, 0.02-0.3 mcg/kg/min, Last Rate: 0.09 mcg/kg/min (10/05/23 1846)  Pharmacy to Dose Cefepime,   Pharmacy to dose vancomycin,        Patient continues to be critically ill, remains at risk of clinical  deterioration or death and needed high complexity decision making. I have spent a total of 40 minutes providing critical care services to this patient including but not limited to: review of labs/ microbiology/imaging/medications, serial monitoring of vital signs,  review of other consultant's notes, review of events in the last 24 hrs, monitoring input/output, review of treatment plan with bedside nurse, RT and other treatment team, management of life support and nutrition needs.     No family at bedside.    Time spent in performing separately billable procedures and updating family is not included in the critical care time.       Electronically signed by SANYA Rendon, 10/05/23, 7:34 PM EDT.

## 2023-10-05 NOTE — PROGRESS NOTES
"Pharmacy Antimicrobial Dosing Service    Subjective:  Idalia Oliver is a 53 y.o.female admitted with sepsis. Pharmacy has been consulted to dose Vancomycin and Cefepime for possible sepsis of unknown origin.    PMH: BMI >30, elevated Scr      Assessment/Plan    1. Day #1 Vancomycin: Pulse dosing d/t renal dysfxn. Vanc 1500mg IV x 1 dose given in ER. Will initiate pulse dosing at this time due to elevated scr/unstable renal function. Random level with AM labs tomorrow. Will plan to re-dose when vancomycin level <20 mcg/mL.      2. Day #1 Cefepime: 2g IV q12h for estCrCl 30-59 mL/min.    Will continue to monitor drug levels, renal function, culture and sensitivities, and patient clinical status.       Objective:  Relevant clinical data and objective history reviewed:  160 cm (63\")   110 kg (243 lb)   Ideal body weight: 52.4 kg (115 lb 8.3 oz)  Adjusted ideal body weight: 75.5 kg (166 lb 8.2 oz)  Body mass index is 43.05 kg/mý.        Results from last 7 days   Lab Units 10/05/23  1106   CREATININE mg/dL 1.43*     Estimated Creatinine Clearance: 52.9 mL/min (A) (by C-G formula based on SCr of 1.43 mg/dL (H)).  No intake/output data recorded.    Results from last 7 days   Lab Units 10/05/23  1106   WBC 10*3/mm3 11.50*     Temperature    10/05/23 1053 10/05/23 1135   Temp: 99.1 øF (37.3 øC) (!) 103.7 øF (39.8 øC)  Comment: Notified nurse and provider.     Baseline culture/source/susceptibility:  Microbiology Results (last 10 days)       ** No results found for the last 240 hours. **            Sierra Dimas [facility]  10/05/23 15:14 EDT    "

## 2023-10-06 ENCOUNTER — APPOINTMENT (OUTPATIENT)
Dept: GENERAL RADIOLOGY | Facility: HOSPITAL | Age: 53
DRG: 871 | End: 2023-10-06
Payer: COMMERCIAL

## 2023-10-06 ENCOUNTER — ANESTHESIA (OUTPATIENT)
Dept: GASTROENTEROLOGY | Facility: HOSPITAL | Age: 53
DRG: 871 | End: 2023-10-06
Payer: COMMERCIAL

## 2023-10-06 ENCOUNTER — APPOINTMENT (OUTPATIENT)
Dept: ULTRASOUND IMAGING | Facility: HOSPITAL | Age: 53
DRG: 871 | End: 2023-10-06
Payer: COMMERCIAL

## 2023-10-06 ENCOUNTER — ANESTHESIA EVENT (OUTPATIENT)
Dept: GASTROENTEROLOGY | Facility: HOSPITAL | Age: 53
DRG: 871 | End: 2023-10-06
Payer: COMMERCIAL

## 2023-10-06 ENCOUNTER — INPATIENT HOSPITAL (OUTPATIENT)
Dept: URBAN - METROPOLITAN AREA HOSPITAL 84 | Facility: HOSPITAL | Age: 53
End: 2023-10-06

## 2023-10-06 DIAGNOSIS — R10.11 RIGHT UPPER QUADRANT PAIN: ICD-10-CM

## 2023-10-06 DIAGNOSIS — D72.829 ELEVATED WHITE BLOOD CELL COUNT, UNSPECIFIED: ICD-10-CM

## 2023-10-06 DIAGNOSIS — K83.09 OTHER CHOLANGITIS: ICD-10-CM

## 2023-10-06 DIAGNOSIS — R94.5 ABNORMAL RESULTS OF LIVER FUNCTION STUDIES: ICD-10-CM

## 2023-10-06 DIAGNOSIS — R10.13 EPIGASTRIC PAIN: ICD-10-CM

## 2023-10-06 DIAGNOSIS — Z90.49 ACQUIRED ABSENCE OF OTHER SPECIFIED PARTS OF DIGESTIVE TRACT: ICD-10-CM

## 2023-10-06 PROBLEM — R65.21 SEPTIC SHOCK: Status: ACTIVE | Noted: 2023-10-05

## 2023-10-06 PROBLEM — R74.8 ELEVATED LIVER ENZYMES: Status: ACTIVE | Noted: 2023-10-05

## 2023-10-06 LAB
ALBUMIN SERPL-MCNC: 3.3 G/DL (ref 3.5–5.2)
ALBUMIN/GLOB SERPL: 1.3 G/DL
ALP SERPL-CCNC: 273 U/L (ref 39–117)
ALT SERPL W P-5'-P-CCNC: 120 U/L (ref 1–33)
ANION GAP SERPL CALCULATED.3IONS-SCNC: 12 MMOL/L (ref 5–15)
AST SERPL-CCNC: 94 U/L (ref 1–32)
BACTERIA BLD CULT: ABNORMAL
BASOPHILS # BLD AUTO: 0 10*3/MM3 (ref 0–0.2)
BASOPHILS NFR BLD AUTO: 0.2 % (ref 0–1.5)
BILIRUB SERPL-MCNC: 3.7 MG/DL (ref 0–1.2)
BOTTLE TYPE: ABNORMAL
BUN SERPL-MCNC: 27 MG/DL (ref 6–20)
BUN/CREAT SERPL: 20.8 (ref 7–25)
CALCIUM SPEC-SCNC: 8.8 MG/DL (ref 8.6–10.5)
CHLORIDE SERPL-SCNC: 106 MMOL/L (ref 98–107)
CO2 SERPL-SCNC: 23 MMOL/L (ref 22–29)
CREAT SERPL-MCNC: 1.3 MG/DL (ref 0.57–1)
D-LACTATE SERPL-SCNC: 2 MMOL/L (ref 0.5–2)
D-LACTATE SERPL-SCNC: 2 MMOL/L (ref 0.5–2)
DEPRECATED RDW RBC AUTO: 45.1 FL (ref 37–54)
EGFRCR SERPLBLD CKD-EPI 2021: 48.7 ML/MIN/1.73
EOSINOPHIL # BLD AUTO: 0.2 10*3/MM3 (ref 0–0.4)
EOSINOPHIL NFR BLD AUTO: 0.8 % (ref 0.3–6.2)
ERYTHROCYTE [DISTWIDTH] IN BLOOD BY AUTOMATED COUNT: 14.2 % (ref 12.3–15.4)
GLOBULIN UR ELPH-MCNC: 2.6 GM/DL
GLUCOSE SERPL-MCNC: 78 MG/DL (ref 65–99)
HCT VFR BLD AUTO: 36.2 % (ref 34–46.6)
HGB BLD-MCNC: 11.9 G/DL (ref 12–15.9)
LYMPHOCYTES # BLD AUTO: 1.5 10*3/MM3 (ref 0.7–3.1)
LYMPHOCYTES NFR BLD AUTO: 5.7 % (ref 19.6–45.3)
MAGNESIUM SERPL-MCNC: 3.4 MG/DL (ref 1.6–2.6)
MCH RBC QN AUTO: 28.3 PG (ref 26.6–33)
MCHC RBC AUTO-ENTMCNC: 32.8 G/DL (ref 31.5–35.7)
MCV RBC AUTO: 86.2 FL (ref 79–97)
MONOCYTES # BLD AUTO: 0.9 10*3/MM3 (ref 0.1–0.9)
MONOCYTES NFR BLD AUTO: 3.4 % (ref 5–12)
NEUTROPHILS NFR BLD AUTO: 23.9 10*3/MM3 (ref 1.7–7)
NEUTROPHILS NFR BLD AUTO: 89.9 % (ref 42.7–76)
NRBC BLD AUTO-RTO: 0 /100 WBC (ref 0–0.2)
PHOSPHATE SERPL-MCNC: 3.1 MG/DL (ref 2.5–4.5)
PLATELET # BLD AUTO: 106 10*3/MM3 (ref 140–450)
PMV BLD AUTO: 10 FL (ref 6–12)
POTASSIUM SERPL-SCNC: 4.4 MMOL/L (ref 3.5–5.2)
PROCALCITONIN SERPL-MCNC: 96.19 NG/ML (ref 0–0.25)
PROT SERPL-MCNC: 5.9 G/DL (ref 6–8.5)
QT INTERVAL: 285 MS
QTC INTERVAL: 428 MS
RBC # BLD AUTO: 4.2 10*6/MM3 (ref 3.77–5.28)
SODIUM SERPL-SCNC: 141 MMOL/L (ref 136–145)
VANCOMYCIN SERPL-MCNC: 8.1 MCG/ML (ref 5–40)
WBC NRBC COR # BLD: 26.5 10*3/MM3 (ref 3.4–10.8)

## 2023-10-06 PROCEDURE — 83735 ASSAY OF MAGNESIUM: CPT | Performed by: NURSE PRACTITIONER

## 2023-10-06 PROCEDURE — 25010000002 SUCCINYLCHOLINE PER 20 MG: Performed by: NURSE ANESTHETIST, CERTIFIED REGISTERED

## 2023-10-06 PROCEDURE — 25010000002 MORPHINE PER 10 MG: Performed by: ANESTHESIOLOGY

## 2023-10-06 PROCEDURE — 84100 ASSAY OF PHOSPHORUS: CPT | Performed by: NURSE PRACTITIONER

## 2023-10-06 PROCEDURE — 25010000002 SUGAMMADEX 200 MG/2ML SOLUTION: Performed by: NURSE ANESTHETIST, CERTIFIED REGISTERED

## 2023-10-06 PROCEDURE — C1769 GUIDE WIRE: HCPCS | Performed by: INTERNAL MEDICINE

## 2023-10-06 PROCEDURE — 25010000002 FENTANYL CITRATE (PF) 100 MCG/2ML SOLUTION: Performed by: NURSE ANESTHETIST, CERTIFIED REGISTERED

## 2023-10-06 PROCEDURE — 25810000003 LACTATED RINGERS PER 1000 ML: Performed by: NURSE ANESTHETIST, CERTIFIED REGISTERED

## 2023-10-06 PROCEDURE — 25810000003 SODIUM CHLORIDE 0.9 % SOLUTION 250 ML FLEX CONT: Performed by: INTERNAL MEDICINE

## 2023-10-06 PROCEDURE — 25010000002 CEFTRIAXONE PER 250 MG: Performed by: INTERNAL MEDICINE

## 2023-10-06 PROCEDURE — 25010000002 PROPOFOL 200 MG/20ML EMULSION: Performed by: NURSE ANESTHETIST, CERTIFIED REGISTERED

## 2023-10-06 PROCEDURE — 84145 PROCALCITONIN (PCT): CPT | Performed by: STUDENT IN AN ORGANIZED HEALTH CARE EDUCATION/TRAINING PROGRAM

## 2023-10-06 PROCEDURE — 43260 ERCP W/SPECIMEN COLLECTION: CPT | Performed by: INTERNAL MEDICINE

## 2023-10-06 PROCEDURE — 85025 COMPLETE CBC W/AUTO DIFF WBC: CPT | Performed by: STUDENT IN AN ORGANIZED HEALTH CARE EDUCATION/TRAINING PROGRAM

## 2023-10-06 PROCEDURE — 80053 COMPREHEN METABOLIC PANEL: CPT | Performed by: STUDENT IN AN ORGANIZED HEALTH CARE EDUCATION/TRAINING PROGRAM

## 2023-10-06 PROCEDURE — 25010000002 VANCOMYCIN HCL 1.25 G RECONSTITUTED SOLUTION 1 EACH VIAL: Performed by: INTERNAL MEDICINE

## 2023-10-06 PROCEDURE — 25010000002 METRONIDAZOLE 500 MG/100ML SOLUTION: Performed by: STUDENT IN AN ORGANIZED HEALTH CARE EDUCATION/TRAINING PROGRAM

## 2023-10-06 PROCEDURE — 25010000002 DEXAMETHASONE PER 1 MG: Performed by: NURSE ANESTHETIST, CERTIFIED REGISTERED

## 2023-10-06 PROCEDURE — 76705 ECHO EXAM OF ABDOMEN: CPT

## 2023-10-06 PROCEDURE — 83605 ASSAY OF LACTIC ACID: CPT | Performed by: NURSE PRACTITIONER

## 2023-10-06 PROCEDURE — 80202 ASSAY OF VANCOMYCIN: CPT | Performed by: EMERGENCY MEDICINE

## 2023-10-06 PROCEDURE — 25010000002 ONDANSETRON PER 1 MG: Performed by: NURSE ANESTHETIST, CERTIFIED REGISTERED

## 2023-10-06 PROCEDURE — 0 CEFEPIME PER 500 MG: Performed by: STUDENT IN AN ORGANIZED HEALTH CARE EDUCATION/TRAINING PROGRAM

## 2023-10-06 PROCEDURE — 25010000002 ONDANSETRON PER 1 MG: Performed by: STUDENT IN AN ORGANIZED HEALTH CARE EDUCATION/TRAINING PROGRAM

## 2023-10-06 PROCEDURE — 0F798ZZ DILATION OF COMMON BILE DUCT, VIA NATURAL OR ARTIFICIAL OPENING ENDOSCOPIC: ICD-10-PCS | Performed by: INTERNAL MEDICINE

## 2023-10-06 PROCEDURE — 25810000003 LACTATED RINGERS PER 1000 ML: Performed by: NURSE PRACTITIONER

## 2023-10-06 PROCEDURE — 99222 1ST HOSP IP/OBS MODERATE 55: CPT | Mod: 25

## 2023-10-06 PROCEDURE — 74330 X-RAY BILE/PANC ENDOSCOPY: CPT

## 2023-10-06 RX ORDER — FENTANYL CITRATE 50 UG/ML
INJECTION, SOLUTION INTRAMUSCULAR; INTRAVENOUS AS NEEDED
Status: DISCONTINUED | OUTPATIENT
Start: 2023-10-06 | End: 2023-10-06 | Stop reason: SURG

## 2023-10-06 RX ORDER — MORPHINE SULFATE 2 MG/ML
2 INJECTION, SOLUTION INTRAMUSCULAR; INTRAVENOUS EVERY 4 HOURS PRN
Status: DISCONTINUED | OUTPATIENT
Start: 2023-10-06 | End: 2023-10-10 | Stop reason: HOSPADM

## 2023-10-06 RX ORDER — PROPOFOL 10 MG/ML
INJECTION, EMULSION INTRAVENOUS AS NEEDED
Status: DISCONTINUED | OUTPATIENT
Start: 2023-10-06 | End: 2023-10-06 | Stop reason: SURG

## 2023-10-06 RX ORDER — INDOMETHACIN 100 MG
SUPPOSITORY, RECTAL RECTAL AS NEEDED
Status: DISCONTINUED | OUTPATIENT
Start: 2023-10-06 | End: 2023-10-06 | Stop reason: HOSPADM

## 2023-10-06 RX ORDER — ONDANSETRON 2 MG/ML
INJECTION INTRAMUSCULAR; INTRAVENOUS AS NEEDED
Status: DISCONTINUED | OUTPATIENT
Start: 2023-10-06 | End: 2023-10-06 | Stop reason: SURG

## 2023-10-06 RX ORDER — SUCCINYLCHOLINE CHLORIDE 20 MG/ML
INJECTION INTRAMUSCULAR; INTRAVENOUS AS NEEDED
Status: DISCONTINUED | OUTPATIENT
Start: 2023-10-06 | End: 2023-10-06 | Stop reason: SURG

## 2023-10-06 RX ORDER — SODIUM CHLORIDE, SODIUM LACTATE, POTASSIUM CHLORIDE, CALCIUM CHLORIDE 600; 310; 30; 20 MG/100ML; MG/100ML; MG/100ML; MG/100ML
INJECTION, SOLUTION INTRAVENOUS CONTINUOUS PRN
Status: DISCONTINUED | OUTPATIENT
Start: 2023-10-06 | End: 2023-10-06 | Stop reason: SURG

## 2023-10-06 RX ORDER — LIDOCAINE HYDROCHLORIDE 20 MG/ML
INJECTION, SOLUTION INFILTRATION; PERINEURAL AS NEEDED
Status: DISCONTINUED | OUTPATIENT
Start: 2023-10-06 | End: 2023-10-06 | Stop reason: SURG

## 2023-10-06 RX ORDER — ROCURONIUM BROMIDE 10 MG/ML
INJECTION, SOLUTION INTRAVENOUS AS NEEDED
Status: DISCONTINUED | OUTPATIENT
Start: 2023-10-06 | End: 2023-10-06 | Stop reason: SURG

## 2023-10-06 RX ORDER — DEXAMETHASONE SODIUM PHOSPHATE 4 MG/ML
INJECTION, SOLUTION INTRA-ARTICULAR; INTRALESIONAL; INTRAMUSCULAR; INTRAVENOUS; SOFT TISSUE AS NEEDED
Status: DISCONTINUED | OUTPATIENT
Start: 2023-10-06 | End: 2023-10-06 | Stop reason: SURG

## 2023-10-06 RX ADMIN — SUCCINYLCHOLINE CHLORIDE 120 MG: 20 INJECTION, SOLUTION INTRAMUSCULAR; INTRAVENOUS at 15:06

## 2023-10-06 RX ADMIN — VANCOMYCIN HYDROCHLORIDE 1250 MG: 1.25 INJECTION, POWDER, LYOPHILIZED, FOR SOLUTION INTRAVENOUS at 06:31

## 2023-10-06 RX ADMIN — ONDANSETRON 4 MG: 2 INJECTION INTRAMUSCULAR; INTRAVENOUS at 15:14

## 2023-10-06 RX ADMIN — LIDOCAINE HYDROCHLORIDE 50 MG: 20 INJECTION, SOLUTION INFILTRATION; PERINEURAL at 15:06

## 2023-10-06 RX ADMIN — METRONIDAZOLE 500 MG: 500 INJECTION, SOLUTION INTRAVENOUS at 06:40

## 2023-10-06 RX ADMIN — Medication 10 ML: at 20:05

## 2023-10-06 RX ADMIN — SODIUM CHLORIDE, POTASSIUM CHLORIDE, SODIUM LACTATE AND CALCIUM CHLORIDE 75 ML/HR: 600; 310; 30; 20 INJECTION, SOLUTION INTRAVENOUS at 18:57

## 2023-10-06 RX ADMIN — Medication 10 ML: at 08:57

## 2023-10-06 RX ADMIN — ROCURONIUM BROMIDE 25 MG: 10 INJECTION, SOLUTION INTRAVENOUS at 15:21

## 2023-10-06 RX ADMIN — DEXAMETHASONE SODIUM PHOSPHATE 8 MG: 4 INJECTION, SOLUTION INTRAMUSCULAR; INTRAVENOUS at 15:14

## 2023-10-06 RX ADMIN — SUGAMMADEX 200 MG: 100 INJECTION, SOLUTION INTRAVENOUS at 15:31

## 2023-10-06 RX ADMIN — PROPOFOL 150 MG: 10 INJECTION, EMULSION INTRAVENOUS at 15:06

## 2023-10-06 RX ADMIN — MORPHINE SULFATE 2 MG: 4 INJECTION, SOLUTION INTRAMUSCULAR; INTRAVENOUS at 13:32

## 2023-10-06 RX ADMIN — CEFEPIME 2000 MG: 2 INJECTION, POWDER, FOR SOLUTION INTRAVENOUS at 06:31

## 2023-10-06 RX ADMIN — ONDANSETRON 4 MG: 2 INJECTION INTRAMUSCULAR; INTRAVENOUS at 06:31

## 2023-10-06 RX ADMIN — OXYCODONE HYDROCHLORIDE 7.5 MG: 5 TABLET ORAL at 20:05

## 2023-10-06 RX ADMIN — SODIUM CHLORIDE, SODIUM LACTATE, POTASSIUM CHLORIDE, AND CALCIUM CHLORIDE: .6; .31; .03; .02 INJECTION, SOLUTION INTRAVENOUS at 15:00

## 2023-10-06 RX ADMIN — FENTANYL CITRATE 100 MCG: 50 INJECTION, SOLUTION INTRAMUSCULAR; INTRAVENOUS at 15:06

## 2023-10-06 RX ADMIN — URSODIOL 500 MG: 500 TABLET, FILM COATED ORAL at 20:05

## 2023-10-06 RX ADMIN — PANTOPRAZOLE SODIUM 40 MG: 40 INJECTION, POWDER, LYOPHILIZED, FOR SOLUTION INTRAVENOUS at 06:31

## 2023-10-06 RX ADMIN — ROCURONIUM BROMIDE 10 MG: 10 INJECTION, SOLUTION INTRAVENOUS at 15:06

## 2023-10-06 RX ADMIN — CEFTRIAXONE 2000 MG: 2 INJECTION, POWDER, FOR SOLUTION INTRAMUSCULAR; INTRAVENOUS at 12:36

## 2023-10-06 NOTE — CONSULTS
Infectious Diseases Consult Note    Referring Provider: Kai Thomas DO    Reason for Consultation: Bacteremia and septic shock    Patient Care Team:  Alessandra Barbour MD as PCP - General (Family Medicine)    Chief complaint abdominal pain    Subjective     History of present illness:      This is 53-year-old female with no significant past medical history.  Patient had a prior cholecystectomy in 1994 secondary to biliary stones.  The patient had no problem with her biliary tract until recently she started having pain.  She contributed that to a diet she put herself on since the same problem happened back in 1994.  She came to the hospital and was found to be bacteremic with E. coli she was hypotensive requiring low-dose of norepinephrine drip.  Currently off the norepinephrine drip.  She is currently receiving IV vancomycin and cefepime.  The patient was found to have elevated bilirubin and she is scheduled to have ERCP done by GI service later today.  The patient had no significant past medical history except for cholecystectomy and hypertension.    Review of Systems   Review of Systems   Constitutional: Negative.    HENT: Negative.     Eyes: Negative.    Respiratory: Negative.     Cardiovascular: Negative.    Gastrointestinal:  Positive for abdominal pain.   Genitourinary: Negative.    Musculoskeletal: Negative.    Skin: Negative.    Neurological: Negative.    Hematological: Negative.    Psychiatric/Behavioral: Negative.       Medications  Medications Prior to Admission   Medication Sig Dispense Refill Last Dose    bisoprolol-hydrochlorothiazide (ZIAC) 5-6.25 MG per tablet Take 1 tablet by mouth Daily. 90 tablet 1 10/5/2023    Cholecalciferol (VITAMIN D3 PO) Take 3,000 Units by mouth Daily.   10/5/2023       History  Past Medical History:   Diagnosis Date    Hypertension     Obesity      Past Surgical History:   Procedure Laterality Date    CHOLECYSTECTOMY      DENTAL PROCEDURE         Family  History  Family History   Problem Relation Age of Onset    Hypertension Mother     Hypertension Father     Heart disease Father     Stroke Father     Cancer Father         Prostate    Breast cancer Sister 47    Cancer Sister         Breast       Social History   reports that she has never smoked. She has never been exposed to tobacco smoke. She has never used smokeless tobacco. She reports that she does not currently use alcohol. She reports that she does not use drugs.    Allergies  Patient has no known allergies.    Objective     Vital Signs   Vital Signs (last 24 hours)         10/05 0700  10/06 0659 10/06 0700  10/06 1208   Most Recent      Temp (øF) 97.7 -  103.7      97.8     97.8 (36.6) 10/06 0800    Heart Rate 73 -  142      86     86 10/06 0700    Resp 16 -  26       16 10/06 0000    BP 79/48 -  139/81      117/72     117/72 10/06 0700    SpO2 (%) 0 -  100      95     95 10/06 0700            Physical Exam:  Physical Exam  Vitals and nursing note reviewed.   Constitutional:       Appearance: She is well-developed.   HENT:      Head: Normocephalic and atraumatic.   Eyes:      General: Scleral icterus present.      Pupils: Pupils are equal, round, and reactive to light.   Cardiovascular:      Rate and Rhythm: Normal rate and regular rhythm.      Heart sounds: Normal heart sounds.   Pulmonary:      Effort: Pulmonary effort is normal. No respiratory distress.      Breath sounds: Normal breath sounds. No wheezing or rales.   Abdominal:      General: Bowel sounds are normal. There is no distension.      Palpations: Abdomen is soft. There is no mass.      Tenderness: There is abdominal tenderness. There is no guarding or rebound.   Musculoskeletal:         General: No deformity. Normal range of motion.      Cervical back: Normal range of motion and neck supple.   Skin:     General: Skin is warm.      Findings: No erythema or rash.   Neurological:      Mental Status: She is alert and oriented to person, place, and  time.      Cranial Nerves: No cranial nerve deficit.       Microbiology  Microbiology Results (last 10 days)       Procedure Component Value - Date/Time    Respiratory Panel PCR w/COVID-19(SARS-CoV-2) IVON/ANNIE/ADRIA/PAD/COR/MAD/ALDAIR In-House, NP Swab in UTM/VTM, 3-4 HR TAT - Swab, Nasopharynx [[id number]]  (Normal) Collected: 10/05/23 2051    Lab Status: Final result Specimen: Swab from Nasopharynx Updated: 10/05/23 2149     ADENOVIRUS, PCR Not Detected     Coronavirus 229E Not Detected     Coronavirus HKU1 Not Detected     Coronavirus NL63 Not Detected     Coronavirus OC43 Not Detected     COVID19 Not Detected     Human Metapneumovirus Not Detected     Human Rhinovirus/Enterovirus Not Detected     Influenza A PCR Not Detected     Influenza B PCR Not Detected     Parainfluenza Virus 1 Not Detected     Parainfluenza Virus 2 Not Detected     Parainfluenza Virus 3 Not Detected     Parainfluenza Virus 4 Not Detected     RSV, PCR Not Detected     Bordetella pertussis pcr Not Detected     Bordetella parapertussis PCR Not Detected     Chlamydophila pneumoniae PCR Not Detected     Mycoplasma pneumo by PCR Not Detected    Narrative:      In the setting of a positive respiratory panel with a viral infection PLUS a negative procalcitonin without other underlying concern for bacterial infection, consider observing off antibiotics or discontinuation of antibiotics and continue supportive care. If the respiratory panel is positive for atypical bacterial infection (Bordetella pertussis, Chlamydophila pneumoniae, or Mycoplasma pneumoniae), consider antibiotic de-escalation to target atypical bacterial infection.    MRSA Screen, PCR (Inpatient) - Swab, Nares [[id number]]  (Abnormal) Collected: 10/05/23 2051    Lab Status: Final result Specimen: Swab from Nares Updated: 10/05/23 2218     MRSA PCR MRSA Detected    Narrative:      The negative predictive value of this diagnostic test is high and should only be used to consider de-escalating  anti-MRSA therapy. A positive result may indicate colonization with MRSA and must be correlated clinically.    Blood Culture - Blood, Arm, Right [[id number]]  (Abnormal) Collected: 10/05/23 1109    Lab Status: Preliminary result Specimen: Blood from Arm, Right Updated: 10/06/23 0825     Blood Culture Abnormal Stain     Gram Stain Anaerobic Bottle Gram negative bacilli    Narrative:      Less than seven (7) mL's of blood was collected.  Insufficient quantity may yield false negative results.    Blood Culture ID, PCR - Blood, Arm, Right [[id number]]  (Abnormal) Collected: 10/05/23 1109    Lab Status: Final result Specimen: Blood from Arm, Right Updated: 10/06/23 0825     BCID, PCR Escherichia coli. Identification by BCID2 PCR.     BOTTLE TYPE Anaerobic Bottle    Narrative:      No resistance genes detected.            Laboratory  Results from last 7 days   Lab Units 10/06/23  0639   WBC 10*3/mm3 26.50*   HEMOGLOBIN g/dL 11.9*   HEMATOCRIT % 36.2   PLATELETS 10*3/mm3 106*     Results from last 7 days   Lab Units 10/06/23  0639   SODIUM mmol/L 141   POTASSIUM mmol/L 4.4   CHLORIDE mmol/L 106   CO2 mmol/L 23.0   BUN mg/dL 27*   CREATININE mg/dL 1.30*   GLUCOSE mg/dL 78   CALCIUM mg/dL 8.8     Results from last 7 days   Lab Units 10/06/23  0639   SODIUM mmol/L 141   POTASSIUM mmol/L 4.4   CHLORIDE mmol/L 106   CO2 mmol/L 23.0   BUN mg/dL 27*   CREATININE mg/dL 1.30*   GLUCOSE mg/dL 78   CALCIUM mg/dL 8.8                   Radiology  Imaging Results (Last 72 Hours)       Procedure Component Value Units Date/Time    US Liver [[id number]] Collected: 10/06/23 0742     Updated: 10/06/23 0746    Narrative:      US LIVER    Date of Exam: 10/6/2023 5:29 AM EDT    Indication: elevated LFTs, abd pain.    Comparison: 8/22/2022    Technique: Grayscale and color Doppler ultrasound evaluation of the right upper quadrant was performed.      Findings:  Liver normal in echogenicity and texture. No intrahepatic biliary duct dilatation. Patent  interrogated hepatic and portal veins. Gallbladder surgically absent. Common duct normal at 5 mm      Impression:      Impression:  Unremarkable ultrasound appearance of the liver        Electronically Signed: Mauro Flores    10/6/2023 7:44 AM EDT    Workstation ID: [id number]    CT Chest Without Contrast Diagnostic [[id number]] Collected: 10/05/23 1419     Updated: 10/05/23 1422    Narrative:      CT CHEST WO CONTRAST DIAGNOSTIC    Date of Exam: 10/5/2023 1:08 PM CDT    Indication: Respiratory illness, nondiagnostic xray.    Comparison: Chest x-ray 10/5/2023    Technique: Axial CT images were obtained of the chest without contrast administration.  Sagittal and coronal reconstructions were performed.  Automated exposure control and iterative reconstruction methods were used.      Findings:  MEDIASTINUM: Unremarkable. Aortic and heart size are normal. No mass nor pericardial effusion.  CORONARY ARTERIES: Nocalcified atherosclerotic disease.  LUNGS: Lungs are clear. No consolidation. There are few calcified granulomata. No significant nodule nor interstitial changes.  PLEURAL SPACE: No effusion, mass, nor pneumothorax.  LYMPH NODES: There are no pathologically enlarged lymph nodes.    UPPER ABDOMEN: Unremarkable    OSSEOUS STRUCTURES: Appropriate for age with no acute process identified.          Impression:      Impression:  No acute process identified.      Electronically Signed: Joshua Butts MD    10/5/2023 1:20 PM CDT    Workstation ID: [id number]    XR Chest 1 View [[id number]] Collected: 10/05/23 1242     Updated: 10/05/23 1247    Narrative:      XR CHEST 1 VW    Date of Exam: 10/5/2023 12:31 PM EDT    Indication: Severe Sepsis Protocol    Comparison: 2/22/2022    Findings:  Heart size and pulmonary vessels are within normal limits. Lungs are clear bilaterally. No pleural effusion. No evidence pneumothorax. Bony structures are unremarkable.      Impression:      Impression:    1. No acute cardiopulmonary  disease.      Electronically Signed: Micheal Lua MD    10/5/2023 12:45 PM EDT    Workstation ID: [id number]    CT Abdomen Pelvis Without Contrast [[id number]] Collected: 10/05/23 1210     Updated: 10/05/23 1216    Narrative:      CT ABDOMEN PELVIS WO CONTRAST    Date of Exam: 10/5/2023 11:54 AM EDT    Indication: Abdominal pain, acute, nonlocalized. Epigastric pain, fever    Comparison: None available.    Technique: Axial CT images were obtained of the abdomen and pelvis without the administration of contrast. Sagittal and coronal reconstructions were performed.  Automated exposure control and iterative reconstruction methods were used.    Findings:  Lung bases without consolidation. Negative for pericardial or pleural effusion.    Lack of contrast limits assessment of abdominal organs and vasculature. Liver and spleen are without acute abnormality. Normal adrenal glands. Pancreas without findings of pancreatitis. Gallbladder absent. No significant biliary dilatation.    The kidneys are symmetric in size. Negative for hydronephrosis or hydroureter. No obstructing ureteral calculus. Urinary bladder is collapsed. Uterus and ovaries without acute abnormality. Multiple calcified pelvic phleboliths. Nabothian cyst in cervix.    Negative for pneumoperitoneum. No bowel obstruction. Colonic diverticulosis. No fluid collection. Normal appendix. Abdominal aorta without aneurysm. No aggressive osseous lesion or acute fracture. Facet arthritis in the lower lumbar spine at L4-5 and   L5-S1.      Impression:      Impression:  1. No acute abnormality in the abdomen or pelvis.  2. Colonic diverticulosis without diverticulitis.  3. Prior cholecystectomy.        Electronically Signed: Nasir Awad MD    10/5/2023 12:14 PM EDT    Workstation ID: [id number]            Cardiology      Results Review:  I have reviewed all clinical data, test, lab, and imaging results.       Schedule Meds  cefTRIAXone, 2,000 mg, Intravenous,  Q24H  pantoprazole, 40 mg, Intravenous, Q AM  sodium chloride, 10 mL, Intravenous, Q12H  ursodiol, 500 mg, Oral, Q12H        Infusion Meds  lactated ringers, 75 mL/hr, Last Rate: 75 mL/hr (10/06/23 4824)        PRN Meds    Calcium Replacement - Follow Nurse / BPA Driven Protocol    Magnesium Standard Dose Replacement - Follow Nurse / BPA Driven Protocol    ondansetron **OR** ondansetron    oxyCODONE    Phosphorus Replacement - Follow Nurse / BPA Driven Protocol    Potassium Replacement - Follow Nurse / BPA Driven Protocol    sodium chloride    sodium chloride    sodium chloride    temazepam      Assessment & Plan       Assessment    E. coli bacteremia associated with elevated transaminase and hyperbilirubinemia.  Most likely secondary to biliary source.  PCR did not detect resistant gene    Septic shock secondary to above.  Resolved.    History of cholecystectomy 1994    Plan    Discontinue IV cefepime  Start ceftriaxone 2 g IV daily  Waiting on final blood culture results  The patient will need 2 weeks of antimicrobial therapy  The patient is scheduled for ERCP later today  A.m. labs  Supportive care  The case was discussed with the patient and her  at bedside    Teja Gillis MD  10/06/23  12:08 EDT    Note is dictated utilizing voice recognition software/Dragon

## 2023-10-06 NOTE — CASE MANAGEMENT/SOCIAL WORK
Continued Stay Note  [facility]     Patient Name: Idalia Oliver  MRN: [id number]  Today's Date: 10/6/2023    Admit Date: 10/5/2023    Plan: Home with spouse.   Discharge Plan       Row Name 10/06/23 1309       Plan    Plan Home with spouse.    Patient/Family in Agreement with Plan yes    Plan Comments Patient will discharge home with spouse. D/C barriers: ERCP, sepsis, elevated BUN/Cr, pending blood cultures, IV abx, GI, ID following.             Maegan Phillips RN, BSN  3A/2A   [address] [address]  Phone: [phone]  Fax: [phone]

## 2023-10-06 NOTE — PLAN OF CARE
Goal Outcome Evaluation:      Pt remains in NSR throughout shift. Pt is on RA. Pt was NPO most of the day for ERCP. Diet restarted at 1800. Pt has had no BM throughout shift. UOP 1400. VSS.

## 2023-10-06 NOTE — CONSULTS
GI CONSULT  NOTE:    Referring Provider:  Dr. Thomas    Chief complaint: abd pain    Subjective .     History of present illness: Idalia Oliver is a 53 y.o. female with history of hypertension, obesity, cholecystectomy presented to the hospital with complaints of abdominal pain and nausea.  Her pain began 3 days ago in her epigastric area and radiated into her right side around to her back.  She has experienced this pain before and is usually related to eating certain foods and resolves relatively quickly.  This time, the pain did not improve and she became lightheaded, dizzy.  She also reports a temperature of 103 at home.  She states this pain feels similar to when her gallbladder was removed.  She had cholecystectomy in 1994 due to gallstones.  She is not sure if she had pancreatitis.  She does report having an ERCP at this time.  She has had 3 attacks like this since cholecystectomy.  Prior to cholecystectomy, she lost a lot of weight with dieting.  More recently, she has lost 50 pounds with with claudio diet.       Endo History:  No EGD/colonoscopy per our records.    Past Medical History:  Past Medical History:   Diagnosis Date    Hypertension     Obesity        Past Surgical History:  Past Surgical History:   Procedure Laterality Date    CHOLECYSTECTOMY      DENTAL PROCEDURE         Social History:  Social History     Tobacco Use    Smoking status: Never     Passive exposure: Never    Smokeless tobacco: Never   Vaping Use    Vaping Use: Never used   Substance Use Topics    Alcohol use: Not Currently    Drug use: Never       Family History:  Family History   Problem Relation Age of Onset    Hypertension Mother     Hypertension Father     Heart disease Father     Stroke Father     Cancer Father         Prostate    Breast cancer Sister 47    Cancer Sister         Breast       Medications:  Medications Prior to Admission   Medication Sig Dispense Refill Last Dose    bisoprolol-hydrochlorothiazide (ZIAC) 5-6.25 MG  per tablet Take 1 tablet by mouth Daily. 90 tablet 1 10/5/2023    Cholecalciferol (VITAMIN D3 PO) Take 3,000 Units by mouth Daily.   10/5/2023       Scheduled Meds:cefepime, 2,000 mg, Intravenous, Q12H  midodrine, 10 mg, Oral, TID AC  pantoprazole, 40 mg, Intravenous, Q AM  sodium chloride, 10 mL, Intravenous, Q12H  ursodiol, 500 mg, Oral, Q12H      Continuous Infusions:lactated ringers, 75 mL/hr, Last Rate: 75 mL/hr (10/06/23 0504)      PRN Meds:.  Calcium Replacement - Follow Nurse / BPA Driven Protocol    Magnesium Standard Dose Replacement - Follow Nurse / BPA Driven Protocol    ondansetron **OR** ondansetron    oxyCODONE    Phosphorus Replacement - Follow Nurse / BPA Driven Protocol    Potassium Replacement - Follow Nurse / BPA Driven Protocol    sodium chloride    sodium chloride    sodium chloride    temazepam    ALLERGIES:  Patient has no known allergies.    ROS:  The following systems were reviewed and negative;   Constitution:  No fevers, chills, no unintentional weight loss  Skin: no rash, no jaundice  Eyes:  No blurry vision, no eye pain  HENT:  No change in hearing or smell  Resp:  No dyspnea or cough  CV:  No chest pain or palpitations  :  No dysuria, hematuria  Musculoskeletal:  No leg cramps or arthralgias  Neuro:  No tremor, no numbness  Psych:  No depression or confusion    Objective     Vital Signs:   Vitals:    10/06/23 0630 10/06/23 0635 10/06/23 0700 10/06/23 0800   BP: 125/78 139/81 117/72    BP Location:       Patient Position:       Pulse: 85 86 86    Resp:       Temp:    97.8 øF (36.6 øC)   TempSrc:    Oral   SpO2: 95% 96% 95%    Weight:       Height:           Physical Exam:     General Appearance:    Awake and alert, in no acute distress   Head:    Normocephalic, without obvious abnormality, atraumatic   Throat:   No oral lesions, no thrush, oral mucosa moist   Lungs:     Respirations regular, even and unlabored   Chest Wall:    No abnormalities observed   Abdomen:     Soft,  +epigastric, RUQ ttp, no rebound or guarding, non-distended, no hepatosplenomegaly   Rectal:     Deferred   Extremities:   Moves all extremities, no edema, no cyanosis   Pulses:   Pulses palpable and equal bilaterally   Skin:   No rash, no jaundice, normal palpation       Neurologic:   Cranial nerves 2 - 12 grossly intact, no asterixis       Results Review:   I reviewed the patient's labs and imaging.  CBC    Results from last 7 days   Lab Units 10/06/23  0639 10/05/23  1106   WBC 10*3/mm3 26.50* 11.50*   HEMOGLOBIN g/dL 11.9* 13.9   PLATELETS 10*3/mm3 106* 146     CMP   Results from last 7 days   Lab Units 10/06/23  0639 10/05/23  2154 10/05/23  1106   SODIUM mmol/L 141 139 139   POTASSIUM mmol/L 4.4 3.3* 3.6   CHLORIDE mmol/L 106 105 104   CO2 mmol/L 23.0 22.0 17.0*   BUN mg/dL 27* 28* 22*   CREATININE mg/dL 1.30* 1.69* 1.43*   GLUCOSE mg/dL 78 99 89   ALBUMIN g/dL 3.3* 2.9* 3.1*   BILIRUBIN mg/dL 3.7* 4.5* 6.9*   ALK PHOS U/L 273* 277* 934*   AST (SGOT) U/L 94* 107* 154*   ALT (SGPT) U/L 120* 141* 172*   MAGNESIUM mg/dL 3.4* 1.4*  --    PHOSPHORUS mg/dL 3.1 3.1  --    LIPASE U/L  --   --  11*     Cr Clearance Estimated Creatinine Clearance: 59.1 mL/min (A) (by C-G formula based on SCr of 1.3 mg/dL (H)).  Coag   Results from last 7 days   Lab Units 10/05/23  1106   INR  1.33*   APTT seconds 28.0     HbA1C   Lab Results   Component Value Date    HGBA1C 4.7 01/26/2023    HGBA1C 5.4 02/22/2022    HGBA1C 5.3 03/30/2021     Blood Glucose No results found for: POCGLU  Infection   Results from last 7 days   Lab Units 10/06/23  0639 10/05/23  1109 10/05/23  1106   BLOODCX   --  Abnormal Stain*  --    BCIDPCR   --  Escherichia coli. Identification by BCID2 PCR.*  --    PROCALCITONIN ng/mL 96.19*  --  24.09*     UA    Results from last 7 days   Lab Units 10/05/23  1125   NITRITE UA  Negative   WBC UA /HPF 3-5*   BACTERIA UA /HPF None Seen   SQUAM EPITHEL UA /HPF 21-30*     Radiology(recent) CT Abdomen Pelvis Without  Contrast    Result Date: 10/5/2023  Impression: 1. No acute abnormality in the abdomen or pelvis. 2. Colonic diverticulosis without diverticulitis. 3. Prior cholecystectomy. Electronically Signed: Nasir Awad MD  10/5/2023 12:14 PM EDT  Workstation ID: [id number]    CT Chest Without Contrast Diagnostic    Result Date: 10/5/2023  Impression: No acute process identified. Electronically Signed: Joshua Butts MD  10/5/2023 1:20 PM CDT  Workstation ID: [id number]    US Liver    Result Date: 10/6/2023  Impression: Unremarkable ultrasound appearance of the liver Electronically Signed: Mauro Mark  10/6/2023 7:44 AM EDT  Workstation ID: [id number]    XR Chest 1 View    Result Date: 10/5/2023  Impression: 1. No acute cardiopulmonary disease. Electronically Signed: Micheal Lua MD  10/5/2023 12:45 PM EDT  Workstation ID: [id number]        ASSESSMENT AND PLAN:    53-year-old female with history of cholecystectomy due to gallstones in 1994 presented to the hospital with complaints of epigastric pain, weakness and fever of 103.7.  Met sepsis protocol due to leukocytosis, elevated lactate and hypotension.  Has been started on norepi and abx.  GI consulted d/t elevated LFTs.     -Right upper quadrant/epigastric abdominal pain  -Elevated LFTs  -Leukocytosis  -Febrile  -Sepsis  -Hypotension  -History of cholecystectomy due to gallstones    Plan  Her CT and ultrasound do not show biliary ductal dilatation, however, she is having right upper quadrant pain, LFTs are elevated with cholestatic pattern and signs of infection with leukocytosis and fever.  She has been started on cefepime.  She is on a continuous norepinephrine drip.  She has a history of cholecystectomy due to gallstones and believes she had an ERCP at this time.  Her LFTs are currently trending down, possible that she has passed a stone.  , AST 94, alk phos 293, total bili 3.7.  Hepatitis panel is negative.  We will plan for ERCP today for further evaluation.  We  discussed risks and benefits of this procedure with the patient.  NPO.  Continue antibiotics.  She received heparin last night for VTE prophylaxis.  This has been held.  Continue supportive care.    I discussed the patients findings and my recommendations with the patient.    We appreciate the referral

## 2023-10-06 NOTE — ANESTHESIA POSTPROCEDURE EVALUATION
Patient: Idalia Oliver    Procedure Summary       Date: 10/06/23 Room / Location: [facility] ENDOSCOPY 2 / [facility] ENDOSCOPY    Anesthesia Start: 1500 Anesthesia Stop: 1542    Procedure: ENDOSCOPIC RETROGRADE CHOLANGIOPANCREATOGRAPHYWITH BALLOON CLEAREANCE (12MM - 15MM BALLOON) UP TO 15MM OF BILE DUCT Diagnosis:       Septic shock      RUQ abdominal pain      Elevated liver enzymes      (Septic shock [A41.9, R65.21])      (RUQ abdominal pain [R10.11])      (Elevated liver enzymes [R74.8])    Surgeons: Valentin Campos MD Provider: Venu Cooper MD    Anesthesia Type: general ASA Status: 3            Anesthesia Type: general    Vitals  Vitals Value Taken Time   /72 10/06/23 1612   Temp 98.4 °F (36.9 °C) 10/06/23 1602   Pulse 94 10/06/23 1612   Resp 18 10/06/23 1552   SpO2 95 % 10/06/23 1612   Vitals shown include unvalidated device data.        Post Anesthesia Care and Evaluation    Patient location during evaluation: PACU  Patient participation: complete - patient participated  Level of consciousness: awake  Pain scale: See nurse's notes for pain score.  Pain management: adequate    Airway patency: patent  Anesthetic complications: No anesthetic complications  PONV Status: none  Cardiovascular status: acceptable  Respiratory status: acceptable and spontaneous ventilation  Hydration status: acceptable    Comments: Patient seen and examined postoperatively; vital signs stable; SpO2 greater than or equal to 90%; cardiopulmonary status stable; nausea/vomiting adequately controlled; pain adequately controlled; no apparent anesthesia complications; patient discharged from anesthesia care when discharge criteria were met

## 2023-10-06 NOTE — PROGRESS NOTES
Critical Care Progress Note   Idalia Oliver : 1970 MRN:[id number] LOS:1     Principal Problem: Septic shock     Reason for follow up: All the medical problems listed below    Summary     A 53 y.o. female with PMH of hypertension, obesity presented to the hospital for abdominal pain with nausea and was admitted with a principal diagnosis of Septic shock.  Remained hypotensive in spite of aggressive fluid resuscitation, procalcitonin of 96 with a cortisol of 61.  CT abdomen pelvis with no acute pathology.  Started on broad-spectrum antibiotics and was able to wean off IV vasopressors.  GI was consulted, scheduled for ERCP on 10/6/2023.    Significant events     10/06/23 : Transfer to hospitalist service.  Scheduled for ERCP today, can start diet afterwards.    Assessment / Plan     Septic Shock: ( WBC>12 or <4 or >10% bands, Temp > 100.4 or < 96.8, HR>90, and MAP<65 or SBP<90 )  Unclear etiology, suspected intra-abdominal source.  Procalcitonin of 96.  Follow cultures.  Continue with cefepime.  No diarrhea to suspect C. difficile.  Added midodrine, able to wean off IV norepinephrine.    Transaminitis/hyperbilirubinemia  Unclear etiology, history of cholecystectomy.  Possible pancreatic duct occlusion, scheduled for ERCP.  GI following.    Acute Kidney Injury:   Remains nonoliguric.   Likely prerenal. Possible intrinsic component due to ATN from infection, drugs and hypotension.   C/w IV fluids.   Monitor Input/Output very closely.   Net IO Since Admission: 2,122 mL [10/06/23 1055]     Essential Hypertension: well controlled.   Hold home bisoprolol and HCTZ.  Restart medications as needed.    Acute thrombocytopenia: Likely from sepsis, conservative management.    Normocytic anemia  Morbid Obesity: Body mass index is 44.25 kg/mý.      Code status:   Level Of Support Discussed With: Patient  Code Status (Patient has no pulse and is not breathing): CPR (Attempt to Resuscitate)  Medical Interventions (Patient has  pulse or is breathing): Full Support       Nutrition: NPO Diet NPO Type: Strict NPO   Patient isn't on Tube Feeding    DVT prophylaxis:  Mechanical DVT prophylaxis orders are present.     Subjective / Review of systems     Review of Systems   Feels much better, no further abdominal pain.  No nausea or emesis.  Does not have any loose stools at all.  No chest pain or shortness of breath.  Objective / Physical Exam   Vital signs:  Temp: 97.8 øF (36.6 øC)  BP: 117/72  Heart Rate: 86  Resp: 16  SpO2: 95 %  Weight: 113 kg (249 lb 12.5 oz)    Admission Weight: Weight: 110 kg (243 lb)  Current Weight: Weight: 113 kg (249 lb 12.5 oz)    Input/Output in last 24 hours:    Intake/Output Summary (Last 24 hours) at 10/6/2023 1055  Last data filed at 10/6/2023 1014  Gross per 24 hour   Intake 3872 ml   Output 1750 ml   Net 2122 ml      Physical Exam  Vitals and nursing note reviewed.   Constitutional:       General: She is not in acute distress.     Appearance: Normal appearance.   HENT:      Mouth/Throat:      Mouth: Mucous membranes are moist.      Pharynx: Oropharynx is clear.   Eyes:      General: No scleral icterus.     Extraocular Movements: Extraocular movements intact.      Conjunctiva/sclera: Conjunctivae normal.   Cardiovascular:      Rate and Rhythm: Normal rate.      Heart sounds: No murmur heard.    No gallop.   Pulmonary:      Breath sounds: Normal breath sounds. No wheezing or rales.   Abdominal:      General: Bowel sounds are normal.      Palpations: Abdomen is soft.      Tenderness: There is no abdominal tenderness.   Musculoskeletal:         General: No tenderness.      Right lower leg: No edema.      Left lower leg: No edema.   Neurological:      General: No focal deficit present.      Mental Status: She is alert and oriented to person, place, and time.      Radiology and Labs     Results from last 7 days   Lab Units 10/06/23  0639 10/05/23  1106   WBC 10*3/mm3 26.50* 11.50*   HEMATOCRIT % 36.2 41.3   PLATELETS  10*3/mm3 106* 146      Results from last 7 days   Lab Units 10/06/23  0639 10/05/23  2154 10/05/23  1106   SODIUM mmol/L 141 139 139   POTASSIUM mmol/L 4.4 3.3* 3.6   CHLORIDE mmol/L 106 105 104   CO2 mmol/L 23.0 22.0 17.0*   BUN mg/dL 27* 28* 22*   CREATININE mg/dL 1.30* 1.69* 1.43*      Current medications   Scheduled Meds: cefepime, 2,000 mg, Intravenous, Q12H  midodrine, 10 mg, Oral, TID AC  pantoprazole, 40 mg, Intravenous, Q AM  sodium chloride, 10 mL, Intravenous, Q12H  ursodiol, 500 mg, Oral, Q12H      Continuous Infusions: lactated ringers, 75 mL/hr, Last Rate: 75 mL/hr (10/06/23 0504)        Plan discussed with RN. Reviewed all other data in the last 24 hours, including but not limited to vitals, labs, microbiology, imaging and pertinent notes from other providers. High complexity decision making and high risk of deterioration.    Rishi Shafer MD   Critical Care  10/06/23   10:55 EDT

## 2023-10-06 NOTE — ANESTHESIA PREPROCEDURE EVALUATION
Anesthesia Evaluation     Patient summary reviewed and Nursing notes reviewed   no history of anesthetic complications:   NPO Solid Status: > 8 hours  NPO Liquid Status: > 8 hours           Airway   Mallampati: II  TM distance: >3 FB  Neck ROM: full  No difficulty expected  Dental - normal exam     Pulmonary - negative pulmonary ROS and normal exam   Cardiovascular - normal exam    ECG reviewed    (+) hypertension      Neuro/Psych- negative ROS  GI/Hepatic/Renal/Endo    (+) morbid obesity    Musculoskeletal (-) negative ROS    Abdominal    Substance History - negative use     OB/GYN negative ob/gyn ROS         Other - negative ROS                       Anesthesia Plan    ASA 3     general     (Was started on low dose levophed for low bp and concern for sepsis.  Now off, BP high.  Looks much improved)  intravenous induction     Anesthetic plan, risks, benefits, and alternatives have been provided, discussed and informed consent has been obtained with: patient.    Plan discussed with CRNA and CAA.    CODE STATUS:    Level Of Support Discussed With: Patient  Code Status (Patient has no pulse and is not breathing): CPR (Attempt to Resuscitate)  Medical Interventions (Patient has pulse or is breathing): Full Support

## 2023-10-06 NOTE — PROGRESS NOTES
[facility] Medicine Services   Daily Progress Note    Patient Name: Idalia Oliver  : 1970  MRN: [id number]  Primary Care Physician:  Alessandra Barbour MD  Date of admission: 10/5/2023  Date and Time of Service:       Subjective      Chief Complaint:     Patient feeling significantly better today  No chills or sweats  Mild nausea but no vomiting  No chest pain or SOB          Objective      Vitals:   Temp:  [97.7 øF (36.5 øC)-99 øF (37.2 øC)] 97.8 øF (36.6 øC)  Heart Rate:  [] 89  Resp:  [16-18] 16  BP: ()/(34-88) 127/80  Flow (L/min):  [2] 2    Physical Exam  General: No acute distress  HEENT: Neck supple, normal oral mucosa, no masses, no lymphadenopathy  Lungs: Clear bilaterally, no wheezing, no crackles, no rhonchi. Equal excursions.   CV - Normal S1/S2, no murmur, regular rate and rhythm   Abdomen - Soft, nontender, nondistended, normal bowel sounds  Extremities - no edema, no erythema  Neuro - No focal weakness, normal sensation  Psych - Alert and oriented x3  Skin - no wounds or lesions.          Result Review    Result Review:  I have personally reviewed the results from the time of this admission to 10/6/2023 12:37 EDT and agree with these findings:  [x]  Laboratory  [x]  Microbiology  [x]  Radiology  [x]  EKG/Telemetry   [x]  Cardiology/Vascular   []  Pathology  [x]  Old records  []  Other:  Most notable findings include:           Assessment & Plan      Brief Patient Summary:  Idalia Oliver is a 53 y.o. female who       cefTRIAXone, 2,000 mg, Intravenous, Q24H  pantoprazole, 40 mg, Intravenous, Q AM  sodium chloride, 10 mL, Intravenous, Q12H  ursodiol, 500 mg, Oral, Q12H       lactated ringers, 75 mL/hr, Last Rate: 75 mL/hr (10/06/23 0504)         Active Hospital Problems:  Active Hospital Problems    Diagnosis     **Septic shock     RUQ abdominal pain     Elevated liver enzymes      Plan:     Septic shock  - E Coli Bacteremia  - s/p Pressors d/c  - suspected biliary source  -  Continue IV Abx per ID - Ceftriaxone.   - Blood culture growing GPC in pairs and chains. ID on consult.     Elevated liver enzymes  - Elevated Bilirubin  - s/p ERCP 10/6 - no evidence of stone or debis, likely passed stones  - monitor     CONCEPCIÓN  - Bisoprolol and HCTZ on hold  - Improving with fluids. Continue    HTN  - resume Bisoprolol      Acute thrombocytopenia  - likely related to sepsis. Monitor    Normocytic anemia - Will send anemia work-up    Morbid Obesity - Body mass index is 44.25 kg/mý.     Discussed wit ID  Will d/c [place]        DVT prophylaxis:  Mechanical DVT prophylaxis orders are present.    CODE STATUS:    Level Of Support Discussed With: Patient  Code Status (Patient has no pulse and is not breathing): CPR (Attempt to Resuscitate)  Medical Interventions (Patient has pulse or is breathing): Full Support      Disposition:  I expect patient to be discharged .    This patient has been  and discussed with . 10/06/23      Electronically signed by Navi Vasquez MD, 10/06/23, 12:37 EDT.  [facility] Hospitalist Team

## 2023-10-06 NOTE — PAYOR COMM NOTE
"Idalia Oliver (53 y.o. Female)       Date of Birth   1970    Social Security Number       Address   [address] [facility] [place] [address]    Home Phone   [phone]    MRN   [id number]       Zoroastrian   None    Marital Status                               Admission Date   10/5/23    Admission Type   Emergency    Admitting Provider   Harry Soliman MD    Attending Provider   Navi Vasquez MD    Department, Room/Bed   [facility] ENDO SUITES, ENDO/ENDO       Discharge Date       Discharge Disposition       Discharge Destination                                 Attending Provider: Navi Vasquez MD    Allergies: No Known Allergies    Isolation: None   Infection: MRSA No Isolation this Admit (10/06/23)   Code Status: CPR    Ht: 160 cm (63\")   Wt: 113 kg (249 lb 12.5 oz)    Admission Cmt: None   Principal Problem: Septic shock [A41.9,R65.21]                   Active Insurance as of 10/5/2023       Primary Coverage       Payor Plan Insurance Group Employer/Plan Group    Critical access hospital BLUE [facility] [employer] EMPLOYEE [id number]       Payor Plan Address Payor Plan Phone Number Payor Plan Fax Number Effective Dates    [address] [phone]  1/1/2020 - None Entered    [facility] [id number]         Subscriber Name Subscriber Birth Date Member ID       JESSICA OLIVER 8/14/1961 [id number]                     Emergency Contacts        (Rel.) Home Phone Work Phone Mobile Phone    JESSICA OLIVER (Spouse) [phone]    BenitoJean Paul (Son) -- -- [phone]                 History & Physical        Shannan Ocasio APRN at 10/05/23 1643       Attestation signed by Rishi Shafer MD at 10/06/23 1057    I have personally reviewed all overnight events, labs and other data, reviewed all other pertinent notes, seen and examined this patient today. I have also reviewed the note by SANYA and pertinent changes are reflected in my today's progress note.    Dr." Rishi Shafer MD MPH  Staff Intensivist  10/06/23  10:58 EDT                      Critical Care History and Physical     Idalia Oliver : 1970 MRN:[id number] LOS:0 ROOM: 2301/1     Reason for admission: Sepsis     Assessment / Plan     Septic Shock: ( WBC>12 or <4 or >10% bands, Temp > 100.4 or < 96.8, HR>90, Lactic acid>2, and MAP<65 or SBP<90 )  -Etiology unclear, differentials include pancreatitis or liver disease, ?VIGIL/ Hepatitis  -Blood cultures pending  -UA negative  -RVP pending  -MRSA swab pending  -Started on Levophed  -Persistent hypotension in spite of adequate fluid resuscitation  -C/w additional fluids as needed. Avoid fluid overload  -Titrate vasopressors for a target MAP of 65  -Continue cefepime and flagyl    Elevated LFTs  -  -  -Liver US pending  -Hepatitis panel pending    Essential hypertension  -Currently hypotensive, requiring Levophed  -Resume home antihypertensives when clinically appropriate    Obesity  -BMI 43.05 kg  -Counseled on lifestyle modifications       Level Of Support Discussed With: Patient  Code Status (Patient has no pulse and is not breathing): CPR (Attempt to Resuscitate)  Medical Interventions (Patient has pulse or is breathing): Full Support       Nutrition:   Diet: Regular/House Diet; Texture: Regular Texture (IDDSI 7); Fluid Consistency: Thin (IDDSI 0)     DVT prophylaxis:  Medical DVT prophylaxis orders are present.     History of Present illness     Idalia Oliver is a 53 y.o. female with PMH of hypertension, obesity presented to the hospital for abdominal pain, diarrhea and nausea x24 hours, and was admitted with a principal diagnosis of Sepsis.  Patient stated that the pain was moderate and constant, however without radiation.  Patient denies chest pain, shortness of air, palpitations, dizziness or syncope, headache, chills, or fever.  Denies vomiting, constipation, loss of weight or loss of appetite, dysuria, blood in urine or stool.    ED  course: CT abdomen and pelvis without contrast showed no obstructive change or perforation.  Patient received sepsis fluid bolus in emergency room and given empiric antibiotics after blood cultures were obtained.  Initially she was admitted to the hospitalist group, however remained hypotensive after fluid bolus therefore was transferred to ICU for Levophed gtt. and further work-up.    ACP: Patient does not have advanced directive on file at this facility.  She is alert and orient x4 declares herself full code.    Patient was seen and examined on 10/05/23 at 19:46 EDT .    Subjective / Review of systems     Review of Systems   Constitutional:  Negative for chills and fever.   HENT:  Negative for congestion and sore throat.    Respiratory:  Negative for cough and shortness of breath.    Cardiovascular:  Negative for chest pain and palpitations.   Gastrointestinal:  Positive for abdominal pain. Negative for nausea.   Endocrine: Negative for heat intolerance and polyuria.   Genitourinary:  Negative for dysuria and urgency.   Musculoskeletal:  Negative for arthralgias and myalgias.   Skin:  Negative for rash and wound.   Neurological:  Negative for weakness and numbness.   Psychiatric/Behavioral:  Negative for suicidal ideas. The patient is not nervous/anxious.       Past Medical/Surgical/Social/Family History & Allergies     Past Medical History:   Diagnosis Date    Hypertension     Obesity       Past Surgical History:   Procedure Laterality Date    CHOLECYSTECTOMY      DENTAL PROCEDURE        Social History     Socioeconomic History    Marital status:    Tobacco Use    Smoking status: Never    Smokeless tobacco: Never   Vaping Use    Vaping Use: Never used   Substance and Sexual Activity    Alcohol use: Not Currently    Drug use: Never    Sexual activity: Defer      Family History   Problem Relation Age of Onset    Hypertension Mother     Hypertension Father     Heart disease Father     Stroke Father     Cancer  Father         Prostate    Breast cancer Sister 47    Cancer Sister         Breast      No Known Allergies   Social Determinants of Health     Tobacco Use: Low Risk     Smoking Tobacco Use: Never    Smokeless Tobacco Use: Never    Passive Exposure: Not on file   Alcohol Use: Not on file   Financial Resource Strain: Not on file   Food Insecurity: Not on file   Transportation Needs: Not on file   Physical Activity: Not on file   Stress: Not on file   Social Connections: Not on file   Intimate Partner Violence: Not on file   Depression: Not on file   Housing Stability: Not on file        Home Medications     Prior to Admission medications    Medication Sig Start Date End Date Taking? Authorizing Provider   bisoprolol-hydrochlorothiazide (ZIAC) 5-6.25 MG per tablet Take 1 tablet by mouth Daily. 1/23/23  Yes Leonila Horan APRN   Cholecalciferol (VITAMIN D3 PO) Take 3,000 Units by mouth Daily.   Yes ProviderGloria MD   amoxicillin (AMOXIL) 875 MG tablet 1 tablet Orally Twice a day 7 days 9/14/23 10/5/23     azelastine (ASTELIN) 0.1 % nasal spray instill 2 sprays into the nostril(s) as directed by provider 2 (Two) Times a Day  Patient taking differently: 2 sprays into the nostril(s) as directed by provider As Needed. 2/22/22 10/5/23  Ericka Peñaloza MD   bisoprolol-hydrochlorothiazide (ZIAC) 5-6.25 MG per tablet Take 1 tablet by mouth Daily. 8/11/23 10/5/23     multivitamin with minerals tablet tablet Take 1 tablet by mouth Daily.  10/5/23  Provider, MD Gloria        Objective / Physical Exam     Vital signs:  Temp: (!) 103.7 øF (39.8 øC) (Notified nurse and provider.)  BP: 95/51  Heart Rate: 119  Resp: 26  SpO2: 95 %  Weight: 110 kg (243 lb)    Admission Weight: Weight: 110 kg (243 lb)    Physical Exam  Constitutional:       Appearance: Normal appearance. She is obese.   HENT:      Head: Normocephalic.      Nose: Nose normal.      Mouth/Throat:      Mouth: Mucous membranes are moist.   Eyes:       Extraocular Movements: Extraocular movements intact.      Pupils: Pupils are equal, round, and reactive to light.   Cardiovascular:      Rate and Rhythm: Normal rate and regular rhythm.      Pulses: Normal pulses.      Heart sounds: Normal heart sounds.   Pulmonary:      Effort: Pulmonary effort is normal.      Breath sounds: Normal breath sounds.   Abdominal:      General: Abdomen is flat. Bowel sounds are normal.      Palpations: Abdomen is soft.   Musculoskeletal:         General: Normal range of motion.   Skin:     General: Skin is warm and dry.      Capillary Refill: Capillary refill takes 2 to 3 seconds.      Coloration: Skin is jaundiced.   Neurological:      General: No focal deficit present.      Mental Status: She is alert.   Psychiatric:         Mood and Affect: Mood normal.         Behavior: Behavior normal.        Labs     Results from last 7 days   Lab Units 10/05/23  1106   WBC 10*3/mm3 11.50*   HEMATOCRIT % 41.3   PLATELETS 10*3/mm3 146      Results from last 7 days   Lab Units 10/05/23  1106   SODIUM mmol/L 139   POTASSIUM mmol/L 3.6   CHLORIDE mmol/L 104   CO2 mmol/L 17.0*   BUN mg/dL 22*   CREATININE mg/dL 1.43*        Imaging     Chest X ray: My independent assessment showed no infiltrates or effusions    EKG: My independent evaluation showed sinus tachycardia, no ST -T changes    Current Medications     Scheduled Meds:  [START ON 10/6/2023] cefepime, 2,000 mg, Intravenous, Q12H  heparin (porcine), 5,000 Units, Subcutaneous, Q12H  metroNIDAZOLE, 500 mg, Intravenous, Q8H  midodrine, 10 mg, Oral, TID AC  [START ON 10/6/2023] pantoprazole, 40 mg, Intravenous, Q AM  senna-docusate sodium, 2 tablet, Oral, BID  sodium chloride, 10 mL, Intravenous, Q12H  ursodiol, 500 mg, Oral, Q12H         Continuous Infusions:  lactated ringers, 150 mL/hr, Last Rate: 150 mL/hr (10/05/23 1542)  norepinephrine, 0.02-0.3 mcg/kg/min, Last Rate: 0.09 mcg/kg/min (10/05/23 1846)  Pharmacy to Dose Cefepime,   Pharmacy to dose  vancomycin,        Patient continues to be critically ill, remains at risk of clinical deterioration or death and needed high complexity decision making. I have spent a total of 40 minutes providing critical care services to this patient including but not limited to: review of labs/ microbiology/imaging/medications, serial monitoring of vital signs,  review of other consultant's notes, review of events in the last 24 hrs, monitoring input/output, review of treatment plan with bedside nurse, RT and other treatment team, management of life support and nutrition needs.     No family at bedside.    Time spent in performing separately billable procedures and updating family is not included in the critical care time.       Electronically signed by SANYA Rendon, 10/05/23, 7:34 PM EDT.          Electronically signed by Rishi Shafer MD at 10/06/23 1058          Operative/Procedure Notes (last 48 hours)        Procedures signed by Valentin Campos MD at 10/06/23 1528         Procedure Orders    1. ERCP [[id number]] ordered by Valentin Campos MD at 10/06/23 1517               [Media Unavailable] Scan on 10/6/2023 1517 by Valentin Campos MD: ERCP          Electronically signed by Valentin Campos MD at 10/06/23 1528       Valentin Campos MD at 10/06/23 1519          ENDOSCOPIC RETROGRADE CHOLANGIOPANCREATOGRAPHY Procedure Report    Patient Name:  Idalia Oliver  YOB: 1970    Date of Surgery:  10/6/2023     Preoperative diagnosis:  Cholangitis    Postoperative diagnosis:  Cholangitis           Procedure(s):  ENDOSCOPIC RETROGRADE CHOLANGIOPANCREATOGRAPHYWITH BALLOON CLEAREANCE (12MM - 15MM BALLOON) UP TO 15MM OF BILE DUCT     Staff:  Surgeon(s):  Valentin Campos MD      Anesthesia: General  Indocin suppository 100 mg    Implants:    Nothing was implanted during the procedure    Specimen:        See Below    Estimated blood loss:  Minimal     Complications:  None    Description of Procedure:  Informed consent was obtained for the procedure, including sedation.  Risks of perforation, hemorrhage, adverse drug reaction and aspiration and pancreatitis were discussed.    The patient was brought into the endoscopy suite. Continuous cardiopulmonary monitoring was performed.  After general anesthesia was administered and endotracheal intubation was achieved, the bite block was inserted into the patient's mouth. The patient was placed in the right semiprone position on the fluoroscopy table. Indocin suppository was inserted into the patient's rectum for prophylaxis.   A  film was obtained which showed cholecystectomy clips and an air bronchogram.  The duodenoscope was inserted into the patient's mouth and advanced to the second portion of the duodenum without difficulty.   Limited examination of the patient's esophagus, stomach, and duodenum were performed and were normal.  The duodenoscope was brought into the short position with the major papilla in direct visualization, and it appeared status post complete sphincterotomy with spontaneous drainage of bile.      The bile duct was freely cannulated with a wire and cannulatome, bile was aspirated, and cholangiogram was performed.  Intrahepatic bile ducts were normal and extrahepatic bile duct was dilated to 17 mm in the common hepatic duct and 12 mm in the common bile duct.  No definite filling defect seen.  Evidence of previous cholecystectomy.  A 15 mm balloon was swept through the duct several times with no debris, purulent discharge, or stones removed.  The balloon could be swept through the ampullary opening with mild resistance.  Occlusion cholangiogram was performed which showed no filling defects and there was excellent drainage of bile and contrast so elected not to place a stent    On completion of the exam, the bowel was decompressed, the scope was removed from the patient, the patient  tolerated the procedure well, there were no immediate post-operative complications.  The pancreatic duct was not investigated since it was not the duct of interest.        Impression:  53-year-old female presenting with ascending cholangitis with E. coli bacteremia, right upper quadrant pain, elevated liver enzymes with ERCP performed with balloon clearance of bile duct with no stone or debris or purulent discharge seen.  This suggests she has passed a stone    Recommendations:  Monitor for postoperative complications  Monitor for resolution of liver enzymes  Advance diet as tolerated  Due to bacteremia, would continue antibiotics for at least 10 days  GI will see as needed over the weekend, please call with questions    We appreciate the referral    Electronically signed by Valentin Campos MD, 10/06/23, 3:32 PM EDT.      Electronically signed by Valentin Campos MD at 10/06/23 1535          Physician Progress Notes (last 48 hours)        Rishi Shafer MD at 10/06/23 1050            Critical Care Progress Note   Idalia Oliver : 1970 MRN:[id number] LOS:1     Principal Problem: Septic shock     Reason for follow up: All the medical problems listed below    Summary     A 53 y.o. female with PMH of hypertension, obesity presented to the hospital for abdominal pain with nausea and was admitted with a principal diagnosis of Septic shock.  Remained hypotensive in spite of aggressive fluid resuscitation, procalcitonin of 96 with a cortisol of 61.  CT abdomen pelvis with no acute pathology.  Started on broad-spectrum antibiotics and was able to wean off IV vasopressors.  GI was consulted, scheduled for ERCP on 10/6/2023.    Significant events     10/06/23 : Transfer to hospitalist service.  Scheduled for ERCP today, can start diet afterwards.    Assessment / Plan     Septic Shock: ( WBC>12 or <4 or >10% bands, Temp > 100.4 or < 96.8, HR>90, and MAP<65 or SBP<90 )  Unclear etiology, suspected  intra-abdominal source.  Procalcitonin of 96.  Follow cultures.  Continue with cefepime.  No diarrhea to suspect C. difficile.  Added midodrine, able to wean off IV norepinephrine.    Transaminitis/hyperbilirubinemia  Unclear etiology, history of cholecystectomy.  Possible pancreatic duct occlusion, scheduled for ERCP.  GI following.    Acute Kidney Injury:   Remains nonoliguric.   Likely prerenal. Possible intrinsic component due to ATN from infection, drugs and hypotension.   C/w IV fluids.   Monitor Input/Output very closely.   Net IO Since Admission: 2,122 mL [10/06/23 1055]     Essential Hypertension: well controlled.   Hold home bisoprolol and HCTZ.  Restart medications as needed.    Acute thrombocytopenia: Likely from sepsis, conservative management.    Normocytic anemia  Morbid Obesity: Body mass index is 44.25 kg/mý.      Code status:   Level Of Support Discussed With: Patient  Code Status (Patient has no pulse and is not breathing): CPR (Attempt to Resuscitate)  Medical Interventions (Patient has pulse or is breathing): Full Support       Nutrition: NPO Diet NPO Type: Strict NPO   Patient isn't on Tube Feeding    DVT prophylaxis:  Mechanical DVT prophylaxis orders are present.     Subjective / Review of systems     Review of Systems   Feels much better, no further abdominal pain.  No nausea or emesis.  Does not have any loose stools at all.  No chest pain or shortness of breath.  Objective / Physical Exam   Vital signs:  Temp: 97.8 øF (36.6 øC)  BP: 117/72  Heart Rate: 86  Resp: 16  SpO2: 95 %  Weight: 113 kg (249 lb 12.5 oz)    Admission Weight: Weight: 110 kg (243 lb)  Current Weight: Weight: 113 kg (249 lb 12.5 oz)    Input/Output in last 24 hours:    Intake/Output Summary (Last 24 hours) at 10/6/2023 1055  Last data filed at 10/6/2023 1014  Gross per 24 hour   Intake 3872 ml   Output 1750 ml   Net 2122 ml      Physical Exam  Vitals and nursing note reviewed.   Constitutional:       General: She is not  in acute distress.     Appearance: Normal appearance.   HENT:      Mouth/Throat:      Mouth: Mucous membranes are moist.      Pharynx: Oropharynx is clear.   Eyes:      General: No scleral icterus.     Extraocular Movements: Extraocular movements intact.      Conjunctiva/sclera: Conjunctivae normal.   Cardiovascular:      Rate and Rhythm: Normal rate.      Heart sounds: No murmur heard.    No gallop.   Pulmonary:      Breath sounds: Normal breath sounds. No wheezing or rales.   Abdominal:      General: Bowel sounds are normal.      Palpations: Abdomen is soft.      Tenderness: There is no abdominal tenderness.   Musculoskeletal:         General: No tenderness.      Right lower leg: No edema.      Left lower leg: No edema.   Neurological:      General: No focal deficit present.      Mental Status: She is alert and oriented to person, place, and time.      Radiology and Labs     Results from last 7 days   Lab Units 10/06/23  0639 10/05/23  1106   WBC 10*3/mm3 26.50* 11.50*   HEMATOCRIT % 36.2 41.3   PLATELETS 10*3/mm3 106* 146      Results from last 7 days   Lab Units 10/06/23  0639 10/05/23  2154 10/05/23  1106   SODIUM mmol/L 141 139 139   POTASSIUM mmol/L 4.4 3.3* 3.6   CHLORIDE mmol/L 106 105 104   CO2 mmol/L 23.0 22.0 17.0*   BUN mg/dL 27* 28* 22*   CREATININE mg/dL 1.30* 1.69* 1.43*      Current medications   Scheduled Meds: cefepime, 2,000 mg, Intravenous, Q12H  midodrine, 10 mg, Oral, TID AC  pantoprazole, 40 mg, Intravenous, Q AM  sodium chloride, 10 mL, Intravenous, Q12H  ursodiol, 500 mg, Oral, Q12H      Continuous Infusions: lactated ringers, 75 mL/hr, Last Rate: 75 mL/hr (10/06/23 0506)        Plan discussed with RN. Reviewed all other data in the last 24 hours, including but not limited to vitals, labs, microbiology, imaging and pertinent notes from other providers. High complexity decision making and high risk of deterioration.    Rishi Shafer MD   Critical Care  10/06/23   10:55 EDT        Electronically signed by Rishi Shafer MD at 10/06/23 1055          Consult Notes (last 48 hours)        Teja Gillis MD at 10/06/23 1208        Consult Orders    1. Inpatient Infectious Diseases Consult [[id number]] ordered by Harry Soliman MD at 10/05/23 1355                 Infectious Diseases Consult Note    Referring Provider: Kai Thomas DO    Reason for Consultation: Bacteremia and septic shock    Patient Care Team:  Alessandra Barbour MD as PCP - General (Family Medicine)    Chief complaint abdominal pain    Subjective     History of present illness:      This is 53-year-old female with no significant past medical history.  Patient had a prior cholecystectomy in 1994 secondary to biliary stones.  The patient had no problem with her biliary tract until recently she started having pain.  She contributed that to a diet she put herself on since the same problem happened back in 1994.  She came to the hospital and was found to be bacteremic with E. coli she was hypotensive requiring low-dose of norepinephrine drip.  Currently off the norepinephrine drip.  She is currently receiving IV vancomycin and cefepime.  The patient was found to have elevated bilirubin and she is scheduled to have ERCP done by GI service later today.  The patient had no significant past medical history except for cholecystectomy and hypertension.    Review of Systems   Review of Systems   Constitutional: Negative.    HENT: Negative.     Eyes: Negative.    Respiratory: Negative.     Cardiovascular: Negative.    Gastrointestinal:  Positive for abdominal pain.   Genitourinary: Negative.    Musculoskeletal: Negative.    Skin: Negative.    Neurological: Negative.    Hematological: Negative.    Psychiatric/Behavioral: Negative.       Medications  Medications Prior to Admission   Medication Sig Dispense Refill Last Dose    bisoprolol-hydrochlorothiazide (ZIAC) 5-6.25 MG per tablet Take 1 tablet by mouth Daily. 90 tablet 1  10/5/2023    Cholecalciferol (VITAMIN D3 PO) Take 3,000 Units by mouth Daily.   10/5/2023       History  Past Medical History:   Diagnosis Date    Hypertension     Obesity      Past Surgical History:   Procedure Laterality Date    CHOLECYSTECTOMY      DENTAL PROCEDURE         Family History  Family History   Problem Relation Age of Onset    Hypertension Mother     Hypertension Father     Heart disease Father     Stroke Father     Cancer Father         Prostate    Breast cancer Sister 47    Cancer Sister         Breast       Social History   reports that she has never smoked. She has never been exposed to tobacco smoke. She has never used smokeless tobacco. She reports that she does not currently use alcohol. She reports that she does not use drugs.    Allergies  Patient has no known allergies.    Objective     Vital Signs   Vital Signs (last 24 hours)         10/05 0700  10/06 0659 10/06 0700  10/06 1208   Most Recent      Temp (øF) 97.7 -  103.7      97.8     97.8 (36.6) 10/06 0800    Heart Rate 73 -  142      86     86 10/06 0700    Resp 16 -  26       16 10/06 0000    BP 79/48 -  139/81      117/72     117/72 10/06 0700    SpO2 (%) 0 -  100      95     95 10/06 0700            Physical Exam:  Physical Exam  Vitals and nursing note reviewed.   Constitutional:       Appearance: She is well-developed.   HENT:      Head: Normocephalic and atraumatic.   Eyes:      General: Scleral icterus present.      Pupils: Pupils are equal, round, and reactive to light.   Cardiovascular:      Rate and Rhythm: Normal rate and regular rhythm.      Heart sounds: Normal heart sounds.   Pulmonary:      Effort: Pulmonary effort is normal. No respiratory distress.      Breath sounds: Normal breath sounds. No wheezing or rales.   Abdominal:      General: Bowel sounds are normal. There is no distension.      Palpations: Abdomen is soft. There is no mass.      Tenderness: There is abdominal tenderness. There is no guarding or rebound.    Musculoskeletal:         General: No deformity. Normal range of motion.      Cervical back: Normal range of motion and neck supple.   Skin:     General: Skin is warm.      Findings: No erythema or rash.   Neurological:      Mental Status: She is alert and oriented to person, place, and time.      Cranial Nerves: No cranial nerve deficit.       Microbiology  Microbiology Results (last 10 days)       Procedure Component Value - Date/Time    Respiratory Panel PCR w/COVID-19(SARS-CoV-2) [facility]/ANNIE/[facility] In-House, NP Swab in UTM/VTM, 3-4 HR TAT - Swab, Nasopharynx [[id number]]  (Normal) Collected: 10/05/23 2051    Lab Status: Final result Specimen: Swab from Nasopharynx Updated: 10/05/23 2149     ADENOVIRUS, PCR Not Detected     Coronavirus 229E Not Detected     Coronavirus HKU1 Not Detected     Coronavirus NL63 Not Detected     Coronavirus OC43 Not Detected     COVID19 Not Detected     Human Metapneumovirus Not Detected     Human Rhinovirus/Enterovirus Not Detected     Influenza A PCR Not Detected     Influenza B PCR Not Detected     Parainfluenza Virus 1 Not Detected     Parainfluenza Virus 2 Not Detected     Parainfluenza Virus 3 Not Detected     Parainfluenza Virus 4 Not Detected     RSV, PCR Not Detected     Bordetella pertussis pcr Not Detected     Bordetella parapertussis PCR Not Detected     Chlamydophila pneumoniae PCR Not Detected     Mycoplasma pneumo by PCR Not Detected    Narrative:      In the setting of a positive respiratory panel with a viral infection PLUS a negative procalcitonin without other underlying concern for bacterial infection, consider observing off antibiotics or discontinuation of antibiotics and continue supportive care. If the respiratory panel is positive for atypical bacterial infection (Bordetella pertussis, Chlamydophila pneumoniae, or Mycoplasma pneumoniae), consider antibiotic de-escalation to target atypical bacterial infection.    MRSA Screen, PCR (Inpatient) - Swab,  Nares [[id number]]  (Abnormal) Collected: 10/05/23 2051    Lab Status: Final result Specimen: Swab from Nares Updated: 10/05/23 2218     MRSA PCR MRSA Detected    Narrative:      The negative predictive value of this diagnostic test is high and should only be used to consider de-escalating anti-MRSA therapy. A positive result may indicate colonization with MRSA and must be correlated clinically.    Blood Culture - Blood, Arm, Right [[id number]]  (Abnormal) Collected: 10/05/23 1109    Lab Status: Preliminary result Specimen: Blood from Arm, Right Updated: 10/06/23 0825     Blood Culture Abnormal Stain     Gram Stain Anaerobic Bottle Gram negative bacilli    Narrative:      Less than seven (7) mL's of blood was collected.  Insufficient quantity may yield false negative results.    Blood Culture ID, PCR - Blood, Arm, Right [[id number]]  (Abnormal) Collected: 10/05/23 1109    Lab Status: Final result Specimen: Blood from Arm, Right Updated: 10/06/23 0825     BCID, PCR Escherichia coli. Identification by BCID2 PCR.     BOTTLE TYPE Anaerobic Bottle    Narrative:      No resistance genes detected.            Laboratory  Results from last 7 days   Lab Units 10/06/23  0639   WBC 10*3/mm3 26.50*   HEMOGLOBIN g/dL 11.9*   HEMATOCRIT % 36.2   PLATELETS 10*3/mm3 106*     Results from last 7 days   Lab Units 10/06/23  0639   SODIUM mmol/L 141   POTASSIUM mmol/L 4.4   CHLORIDE mmol/L 106   CO2 mmol/L 23.0   BUN mg/dL 27*   CREATININE mg/dL 1.30*   GLUCOSE mg/dL 78   CALCIUM mg/dL 8.8     Results from last 7 days   Lab Units 10/06/23  0639   SODIUM mmol/L 141   POTASSIUM mmol/L 4.4   CHLORIDE mmol/L 106   CO2 mmol/L 23.0   BUN mg/dL 27*   CREATININE mg/dL 1.30*   GLUCOSE mg/dL 78   CALCIUM mg/dL 8.8                   Radiology  Imaging Results (Last 72 Hours)       Procedure Component Value Units Date/Time    US Liver [[id number]] Collected: 10/06/23 0742     Updated: 10/06/23 0746    Narrative:      US LIVER    Date of Exam: 10/6/2023  5:29 AM EDT    Indication: elevated LFTs, abd pain.    Comparison: 8/22/2022    Technique: Grayscale and color Doppler ultrasound evaluation of the right upper quadrant was performed.      Findings:  Liver normal in echogenicity and texture. No intrahepatic biliary duct dilatation. Patent interrogated hepatic and portal veins. Gallbladder surgically absent. Common duct normal at 5 mm      Impression:      Impression:  Unremarkable ultrasound appearance of the liver        Electronically Signed: Mauro Verdugones    10/6/2023 7:44 AM EDT    Workstation ID: [id number]    CT Chest Without Contrast Diagnostic [[id number]] Collected: 10/05/23 1419     Updated: 10/05/23 1422    Narrative:      CT CHEST WO CONTRAST DIAGNOSTIC    Date of Exam: 10/5/2023 1:08 PM CDT    Indication: Respiratory illness, nondiagnostic xray.    Comparison: Chest x-ray 10/5/2023    Technique: Axial CT images were obtained of the chest without contrast administration.  Sagittal and coronal reconstructions were performed.  Automated exposure control and iterative reconstruction methods were used.      Findings:  MEDIASTINUM: Unremarkable. Aortic and heart size are normal. No mass nor pericardial effusion.  CORONARY ARTERIES: Nocalcified atherosclerotic disease.  LUNGS: Lungs are clear. No consolidation. There are few calcified granulomata. No significant nodule nor interstitial changes.  PLEURAL SPACE: No effusion, mass, nor pneumothorax.  LYMPH NODES: There are no pathologically enlarged lymph nodes.    UPPER ABDOMEN: Unremarkable    OSSEOUS STRUCTURES: Appropriate for age with no acute process identified.          Impression:      Impression:  No acute process identified.      Electronically Signed: Joshua Butts MD    10/5/2023 1:20 PM CDT    Workstation ID: [id number]    XR Chest 1 View [[id number]] Collected: 10/05/23 1242     Updated: 10/05/23 1247    Narrative:      XR CHEST 1 VW    Date of Exam: 10/5/2023 12:31 PM EDT    Indication: Severe Sepsis  Protocol    Comparison: 2/22/2022    Findings:  Heart size and pulmonary vessels are within normal limits. Lungs are clear bilaterally. No pleural effusion. No evidence pneumothorax. Bony structures are unremarkable.      Impression:      Impression:    1. No acute cardiopulmonary disease.      Electronically Signed: Micheal Lua MD    10/5/2023 12:45 PM EDT    Workstation ID: [id number]    CT Abdomen Pelvis Without Contrast [[id number]] Collected: 10/05/23 1210     Updated: 10/05/23 1216    Narrative:      CT ABDOMEN PELVIS WO CONTRAST    Date of Exam: 10/5/2023 11:54 AM EDT    Indication: Abdominal pain, acute, nonlocalized. Epigastric pain, fever    Comparison: None available.    Technique: Axial CT images were obtained of the abdomen and pelvis without the administration of contrast. Sagittal and coronal reconstructions were performed.  Automated exposure control and iterative reconstruction methods were used.    Findings:  Lung bases without consolidation. Negative for pericardial or pleural effusion.    Lack of contrast limits assessment of abdominal organs and vasculature. Liver and spleen are without acute abnormality. Normal adrenal glands. Pancreas without findings of pancreatitis. Gallbladder absent. No significant biliary dilatation.    The kidneys are symmetric in size. Negative for hydronephrosis or hydroureter. No obstructing ureteral calculus. Urinary bladder is collapsed. Uterus and ovaries without acute abnormality. Multiple calcified pelvic phleboliths. Nabothian cyst in cervix.    Negative for pneumoperitoneum. No bowel obstruction. Colonic diverticulosis. No fluid collection. Normal appendix. Abdominal aorta without aneurysm. No aggressive osseous lesion or acute fracture. Facet arthritis in the lower lumbar spine at L4-5 and   L5-S1.      Impression:      Impression:  1. No acute abnormality in the abdomen or pelvis.  2. Colonic diverticulosis without diverticulitis.  3. Prior  cholecystectomy.        Electronically Signed: Nasir Awad MD    10/5/2023 12:14 PM EDT    Workstation ID: [id number]            Cardiology      Results Review:  I have reviewed all clinical data, test, lab, and imaging results.       Schedule Meds  cefTRIAXone, 2,000 mg, Intravenous, Q24H  pantoprazole, 40 mg, Intravenous, Q AM  sodium chloride, 10 mL, Intravenous, Q12H  ursodiol, 500 mg, Oral, Q12H        Infusion Meds  lactated ringers, 75 mL/hr, Last Rate: 75 mL/hr (10/06/23 0504)        PRN Meds    Calcium Replacement - Follow Nurse / BPA Driven Protocol    Magnesium Standard Dose Replacement - Follow Nurse / BPA Driven Protocol    ondansetron **OR** ondansetron    oxyCODONE    Phosphorus Replacement - Follow Nurse / BPA Driven Protocol    Potassium Replacement - Follow Nurse / BPA Driven Protocol    sodium chloride    sodium chloride    sodium chloride    temazepam      Assessment & Plan       Assessment    E. coli bacteremia associated with elevated transaminase and hyperbilirubinemia.  Most likely secondary to biliary source.  PCR did not detect resistant gene    Septic shock secondary to above.  Resolved.    History of cholecystectomy 1994    Plan    Discontinue IV cefepime  Start ceftriaxone 2 g IV daily  Waiting on final blood culture results  The patient will need 2 weeks of antimicrobial therapy  The patient is scheduled for ERCP later today  A.m. labs  Supportive care  The case was discussed with the patient and her  at bedside    Teja Gillis MD  10/06/23  12:08 EDT    Note is dictated utilizing voice recognition software/Dragon      Electronically signed by Teja Gillis MD at 10/06/23 1211       Maritza Frost APRN at 10/06/23 1013        Consult Orders    1. Inpatient Gastroenterology Consult [[id number]] ordered by Harry Soliman MD at 10/05/23 1355              Attestation signed by Valentin Campos MD at 10/06/23 1248    The patient was seen and examined with an APRN. I  personally performed the substantive portion of the history of presenting illness.  I also performed the entire physical exam and medical decision making.    53-year-old female with a history of cholecystectomy who presented to hospital with abrupt onset of severe epigastric and right upper quadrant pain radiating to her back with nausea that began 3 days ago.  She has experienced this at least 2 other times.  She was febrile and lightheaded.  Her gallbladder was removed for stones in 1994 and she recalls having an ERCP at that time.  She says this feels just like her gallbladder.  She has lost 50 pounds intentionally with a diet.  On exam she has epigastric and right upper quadrant tenderness which is mild without rebound or guarding.  CR 1.3, TB 3.7, , AST 94, , WBC 26.5, procalcitonin 96.19  Ultrasound shows a common bile duct of 5 mm and unremarkable liver CT without contrast shows previous cholecystectomy and liver or bile duct abnormalities  Impression:  Recurrent right upper quadrant abdominal pain with elevated liver enzymes reminiscent of previous gallbladder symptoms with E. coli bacteremia with history of previous ERCP is concerning for retained common bile duct stone with cholangitis versus ampullary stenosis  Acute kidney injury  Obesity  Plan:  Proceed with ERCP today for clearance of bile duct  Continue antibiotics  Continue IV fluids  Further recommendations to follow  Electronically signed by Valentin Campos MD, 10/06/23, 12:45 PM EDT.                       GI CONSULT  NOTE:    Referring Provider:  Dr. Thomas    Chief complaint: abd pain    Subjective .     History of present illness: Idalia Oliver is a 53 y.o. female with history of hypertension, obesity, cholecystectomy presented to the hospital with complaints of abdominal pain and nausea.  Her pain began 3 days ago in her epigastric area and radiated into her right side around to her back.  She has experienced this pain  before and is usually related to eating certain foods and resolves relatively quickly.  This time, the pain did not improve and she became lightheaded, dizzy.  She also reports a temperature of 103 at home.  She states this pain feels similar to when her gallbladder was removed.  She had cholecystectomy in 1994 due to gallstones.  She is not sure if she had pancreatitis.  She does report having an ERCP at this time.  She has had 3 attacks like this since cholecystectomy.  Prior to cholecystectomy, she lost a lot of weight with dieting.  More recently, she has lost 50 pounds with with claudio diet.       Endo History:  No EGD/colonoscopy per our records.    Past Medical History:  Past Medical History:   Diagnosis Date    Hypertension     Obesity        Past Surgical History:  Past Surgical History:   Procedure Laterality Date    CHOLECYSTECTOMY      DENTAL PROCEDURE         Social History:  Social History     Tobacco Use    Smoking status: Never     Passive exposure: Never    Smokeless tobacco: Never   Vaping Use    Vaping Use: Never used   Substance Use Topics    Alcohol use: Not Currently    Drug use: Never       Family History:  Family History   Problem Relation Age of Onset    Hypertension Mother     Hypertension Father     Heart disease Father     Stroke Father     Cancer Father         Prostate    Breast cancer Sister 47    Cancer Sister         Breast       Medications:  Medications Prior to Admission   Medication Sig Dispense Refill Last Dose    bisoprolol-hydrochlorothiazide (ZIAC) 5-6.25 MG per tablet Take 1 tablet by mouth Daily. 90 tablet 1 10/5/2023    Cholecalciferol (VITAMIN D3 PO) Take 3,000 Units by mouth Daily.   10/5/2023       Scheduled Meds:cefepime, 2,000 mg, Intravenous, Q12H  midodrine, 10 mg, Oral, TID AC  pantoprazole, 40 mg, Intravenous, Q AM  sodium chloride, 10 mL, Intravenous, Q12H  ursodiol, 500 mg, Oral, Q12H      Continuous Infusions:lactated ringers, 75 mL/hr, Last Rate: 75 mL/hr  (10/06/23 0504)      PRN Meds:.  Calcium Replacement - Follow Nurse / BPA Driven Protocol    Magnesium Standard Dose Replacement - Follow Nurse / BPA Driven Protocol    ondansetron **OR** ondansetron    oxyCODONE    Phosphorus Replacement - Follow Nurse / BPA Driven Protocol    Potassium Replacement - Follow Nurse / BPA Driven Protocol    sodium chloride    sodium chloride    sodium chloride    temazepam    ALLERGIES:  Patient has no known allergies.    ROS:  The following systems were reviewed and negative;   Constitution:  No fevers, chills, no unintentional weight loss  Skin: no rash, no jaundice  Eyes:  No blurry vision, no eye pain  HENT:  No change in hearing or smell  Resp:  No dyspnea or cough  CV:  No chest pain or palpitations  :  No dysuria, hematuria  Musculoskeletal:  No leg cramps or arthralgias  Neuro:  No tremor, no numbness  Psych:  No depression or confusion    Objective     Vital Signs:   Vitals:    10/06/23 0630 10/06/23 0635 10/06/23 0700 10/06/23 0800   BP: 125/78 139/81 117/72    BP Location:       Patient Position:       Pulse: 85 86 86    Resp:       Temp:    97.8 øF (36.6 øC)   TempSrc:    Oral   SpO2: 95% 96% 95%    Weight:       Height:           Physical Exam:     General Appearance:    Awake and alert, in no acute distress   Head:    Normocephalic, without obvious abnormality, atraumatic   Throat:   No oral lesions, no thrush, oral mucosa moist   Lungs:     Respirations regular, even and unlabored   Chest Wall:    No abnormalities observed   Abdomen:     Soft, +epigastric, RUQ ttp, no rebound or guarding, non-distended, no hepatosplenomegaly   Rectal:     Deferred   Extremities:   Moves all extremities, no edema, no cyanosis   Pulses:   Pulses palpable and equal bilaterally   Skin:   No rash, no jaundice, normal palpation       Neurologic:   Cranial nerves 2 - 12 grossly intact, no asterixis       Results Review:   I reviewed the patient's labs and imaging.  CBC    Results from last 7  days   Lab Units 10/06/23  0639 10/05/23  1106   WBC 10*3/mm3 26.50* 11.50*   HEMOGLOBIN g/dL 11.9* 13.9   PLATELETS 10*3/mm3 106* 146     CMP   Results from last 7 days   Lab Units 10/06/23  0639 10/05/23  2154 10/05/23  1106   SODIUM mmol/L 141 139 139   POTASSIUM mmol/L 4.4 3.3* 3.6   CHLORIDE mmol/L 106 105 104   CO2 mmol/L 23.0 22.0 17.0*   BUN mg/dL 27* 28* 22*   CREATININE mg/dL 1.30* 1.69* 1.43*   GLUCOSE mg/dL 78 99 89   ALBUMIN g/dL 3.3* 2.9* 3.1*   BILIRUBIN mg/dL 3.7* 4.5* 6.9*   ALK PHOS U/L 273* 277* 934*   AST (SGOT) U/L 94* 107* 154*   ALT (SGPT) U/L 120* 141* 172*   MAGNESIUM mg/dL 3.4* 1.4*  --    PHOSPHORUS mg/dL 3.1 3.1  --    LIPASE U/L  --   --  11*     Cr Clearance Estimated Creatinine Clearance: 59.1 mL/min (A) (by C-G formula based on SCr of 1.3 mg/dL (H)).  Coag   Results from last 7 days   Lab Units 10/05/23  1106   INR  1.33*   APTT seconds 28.0     HbA1C   Lab Results   Component Value Date    HGBA1C 4.7 01/26/2023    HGBA1C 5.4 02/22/2022    HGBA1C 5.3 03/30/2021     Blood Glucose No results found for: POCGLU  Infection   Results from last 7 days   Lab Units 10/06/23  0639 10/05/23  1109 10/05/23  1106   BLOODCX   --  Abnormal Stain*  --    BCIDPCR   --  Escherichia coli. Identification by BCID2 PCR.*  --    PROCALCITONIN ng/mL 96.19*  --  24.09*     UA    Results from last 7 days   Lab Units 10/05/23  1125   NITRITE UA  Negative   WBC UA /HPF 3-5*   BACTERIA UA /HPF None Seen   SQUAM EPITHEL UA /HPF 21-30*     Radiology(recent) CT Abdomen Pelvis Without Contrast    Result Date: 10/5/2023  Impression: 1. No acute abnormality in the abdomen or pelvis. 2. Colonic diverticulosis without diverticulitis. 3. Prior cholecystectomy. Electronically Signed: Nasir Awad MD  10/5/2023 12:14 PM EDT  Workstation ID: [id number]    CT Chest Without Contrast Diagnostic    Result Date: 10/5/2023  Impression: No acute process identified. Electronically Signed: Joshua Butts MD  10/5/2023 1:20 PM CDT   Workstation ID: [id number]    US Liver    Result Date: 10/6/2023  Impression: Unremarkable ultrasound appearance of the liver Electronically Signed: Mauro Flores  10/6/2023 7:44 AM EDT  Workstation ID: [id number]    XR Chest 1 View    Result Date: 10/5/2023  Impression: 1. No acute cardiopulmonary disease. Electronically Signed: Michael Lua MD  10/5/2023 12:45 PM EDT  Workstation ID: [id number]        ASSESSMENT AND PLAN:    53-year-old female with history of cholecystectomy due to gallstones in 1994 presented to the hospital with complaints of epigastric pain, weakness and fever of 103.7.  Met sepsis protocol due to leukocytosis, elevated lactate and hypotension.  Has been started on norepi and abx.  GI consulted d/t elevated LFTs.     -Right upper quadrant/epigastric abdominal pain  -Elevated LFTs  -Leukocytosis  -Febrile  -Sepsis  -Hypotension  -History of cholecystectomy due to gallstones    Plan  Her CT and ultrasound do not show biliary ductal dilatation, however, she is having right upper quadrant pain, LFTs are elevated with cholestatic pattern and signs of infection with leukocytosis and fever.  She has been started on cefepime.  She is on a continuous norepinephrine drip.  She has a history of cholecystectomy due to gallstones and believes she had an ERCP at this time.  Her LFTs are currently trending down, possible that she has passed a stone.  , AST 94, alk phos 293, total bili 3.7.  Hepatitis panel is negative.  We will plan for ERCP today for further evaluation.  We discussed risks and benefits of this procedure with the patient.  NPO.  Continue antibiotics.  She received heparin last night for VTE prophylaxis.  This has been held.  Continue supportive care.    I discussed the patients findings and my recommendations with the patient.    We appreciate the referral                 Electronically signed by Valentin Campos MD at 10/06/23 9563

## 2023-10-06 NOTE — OP NOTE
ENDOSCOPIC RETROGRADE CHOLANGIOPANCREATOGRAPHY Procedure Report    Patient Name:  Idalia Oliver  YOB: 1970    Date of Surgery:  10/6/2023     Preoperative diagnosis:  Cholangitis    Postoperative diagnosis:  Cholangitis           Procedure(s):  ENDOSCOPIC RETROGRADE CHOLANGIOPANCREATOGRAPHYWITH BALLOON CLEAREANCE (12MM - 15MM BALLOON) UP TO 15MM OF BILE DUCT     Staff:  Surgeon(s):  Valentin Campos MD      Anesthesia: General  Indocin suppository 100 mg    Implants:    Nothing was implanted during the procedure    Specimen:        See Below    Estimated blood loss: Minimal     Complications:  None    Description of Procedure:  Informed consent was obtained for the procedure, including sedation.  Risks of perforation, hemorrhage, adverse drug reaction and aspiration and pancreatitis were discussed.    The patient was brought into the endoscopy suite. Continuous cardiopulmonary monitoring was performed.  After general anesthesia was administered and endotracheal intubation was achieved, the bite block was inserted into the patient's mouth. The patient was placed in the right semiprone position on the fluoroscopy table. Indocin suppository was inserted into the patient's rectum for prophylaxis.   A  film was obtained which showed cholecystectomy clips and an air bronchogram.  The duodenoscope was inserted into the patient's mouth and advanced to the second portion of the duodenum without difficulty.   Limited examination of the patient's esophagus, stomach, and duodenum were performed and were normal.  The duodenoscope was brought into the short position with the major papilla in direct visualization, and it appeared status post complete sphincterotomy with spontaneous drainage of bile.      The bile duct was freely cannulated with a wire and cannulatome, bile was aspirated, and cholangiogram was performed.  Intrahepatic bile ducts were normal and extrahepatic bile duct was dilated to 17  mm in the common hepatic duct and 12 mm in the common bile duct.  No definite filling defect seen.  Evidence of previous cholecystectomy.  A 15 mm balloon was swept through the duct several times with no debris, purulent discharge, or stones removed.  The balloon could be swept through the ampullary opening with mild resistance.  Occlusion cholangiogram was performed which showed no filling defects and there was excellent drainage of bile and contrast so elected not to place a stent    On completion of the exam, the bowel was decompressed, the scope was removed from the patient, the patient tolerated the procedure well, there were no immediate post-operative complications.  The pancreatic duct was not investigated since it was not the duct of interest.        Impression:  53-year-old female presenting with ascending cholangitis with E. coli bacteremia, right upper quadrant pain, elevated liver enzymes with ERCP performed with balloon clearance of bile duct with no stone or debris or purulent discharge seen.  This suggests she has passed a stone    Recommendations:  Monitor for postoperative complications  Monitor for resolution of liver enzymes  Advance diet as tolerated  Due to bacteremia, would continue antibiotics for at least 10 days  GI will see as needed over the weekend, please call with questions    We appreciate the referral    Electronically signed by Valentin Campos MD, 10/06/23, 3:32 PM EDT.

## 2023-10-06 NOTE — ANESTHESIA PROCEDURE NOTES
Airway  Urgency: elective    Date/Time: 10/6/2023 3:08 PM  Airway not difficult    General Information and Staff    Patient location during procedure: OR  CRNA/CAA: Alen Vizcaino CRNA    Indications and Patient Condition  Indications for airway management: airway protection    Preoxygenated: yes  MILS maintained throughout  Mask difficulty assessment: 0 - not attempted    Final Airway Details  Final airway type: endotracheal airway      Successful airway: ETT  Cuffed: yes   Successful intubation technique: direct laryngoscopy  Endotracheal tube insertion site: oral  Blade: You  Blade size: 3  ETT size (mm): 7.0  Cormack-Lehane Classification: grade I - full view of glottis  Placement verified by: chest auscultation and capnometry   Measured from: teeth  ETT/EBT  to teeth (cm): 22  Number of attempts at approach: 1  Assessment: lips, teeth, and gum same as pre-op and atraumatic intubation

## 2023-10-06 NOTE — CONSULTS
PICC Line Insertion Procedure Note    Procedure: Insertion of #5 FR/16G PICC    Indications:  Pt./DrValeria Preference, Other (vesicants)    Active Time Out:  Correct patient: Yes  Correct procedure: Yes  Correct site: Yes  Verified with: CHRIS Malagon    Procedure Details:  Informed consent was obtained for the procedure.  Risk include, but are not limited to infection, air embolism, catheter tip moving, catheter blockage and phlebitis.     Maximum sterile technique was used including antiseptics, cap, gloves, gown, hand hygiene, mask, and sheet.    Ultrasound Guidance: Yes    #5 FR/16G PICC inserted to the R Cephalic vein per hospital protocol by Sherman Morocho RN.   Non-pulsatile blood return: yes    Lot #: [id number]  Expiration date: 2024-06-30    Complications:  none    Findings:  Catheter inserted to 38 cm, with 0 cm exposed.   Mid upper arm circumference is 46 cm.   Catheter was flushed with 20 cc NS and sterile dressing applied.  Patient tolerated procedure well.  PICC tip verified by:       [x] Sapiens 3cg       [] Chest X-ray    Recommendations:  Verbal and/or written Care/Maintenance instructions provided to patient.   Primary nurse notified.    Kai Morocho RN  10/05/23  21:36 EDT

## 2023-10-07 LAB
ALBUMIN SERPL-MCNC: 2.6 G/DL (ref 3.5–5.2)
ALBUMIN/GLOB SERPL: 0.8 G/DL
ALP SERPL-CCNC: 240 U/L (ref 39–117)
ALT SERPL W P-5'-P-CCNC: 83 U/L (ref 1–33)
ANION GAP SERPL CALCULATED.3IONS-SCNC: 9 MMOL/L (ref 5–15)
AST SERPL-CCNC: 46 U/L (ref 1–32)
BACTERIA BLD CULT: ABNORMAL
BASOPHILS # BLD AUTO: 0 10*3/MM3 (ref 0–0.2)
BASOPHILS NFR BLD AUTO: 0.1 % (ref 0–1.5)
BILIRUB SERPL-MCNC: 1.2 MG/DL (ref 0–1.2)
BOTTLE TYPE: ABNORMAL
BUN SERPL-MCNC: 21 MG/DL (ref 6–20)
BUN/CREAT SERPL: 32.8 (ref 7–25)
CALCIUM SPEC-SCNC: 9.3 MG/DL (ref 8.6–10.5)
CHLORIDE SERPL-SCNC: 108 MMOL/L (ref 98–107)
CO2 SERPL-SCNC: 22 MMOL/L (ref 22–29)
CREAT SERPL-MCNC: 0.64 MG/DL (ref 0.57–1)
DEPRECATED RDW RBC AUTO: 45.5 FL (ref 37–54)
EGFRCR SERPLBLD CKD-EPI 2021: 104.5 ML/MIN/1.73
EOSINOPHIL # BLD AUTO: 0.3 10*3/MM3 (ref 0–0.4)
EOSINOPHIL NFR BLD AUTO: 1.5 % (ref 0.3–6.2)
ERYTHROCYTE [DISTWIDTH] IN BLOOD BY AUTOMATED COUNT: 14.2 % (ref 12.3–15.4)
GLOBULIN UR ELPH-MCNC: 3.2 GM/DL
GLUCOSE SERPL-MCNC: 102 MG/DL (ref 65–99)
HCT VFR BLD AUTO: 34.2 % (ref 34–46.6)
HGB BLD-MCNC: 11.2 G/DL (ref 12–15.9)
LIPASE SERPL-CCNC: 8 U/L (ref 13–60)
LYMPHOCYTES # BLD AUTO: 0.6 10*3/MM3 (ref 0.7–3.1)
LYMPHOCYTES NFR BLD AUTO: 3.3 % (ref 19.6–45.3)
MCH RBC QN AUTO: 28.2 PG (ref 26.6–33)
MCHC RBC AUTO-ENTMCNC: 32.8 G/DL (ref 31.5–35.7)
MCV RBC AUTO: 86.2 FL (ref 79–97)
MONOCYTES # BLD AUTO: 0.3 10*3/MM3 (ref 0.1–0.9)
MONOCYTES NFR BLD AUTO: 1.9 % (ref 5–12)
NEUTROPHILS NFR BLD AUTO: 17.2 10*3/MM3 (ref 1.7–7)
NEUTROPHILS NFR BLD AUTO: 93.2 % (ref 42.7–76)
NRBC BLD AUTO-RTO: 0 /100 WBC (ref 0–0.2)
PLATELET # BLD AUTO: 104 10*3/MM3 (ref 140–450)
PMV BLD AUTO: 10.6 FL (ref 6–12)
POTASSIUM SERPL-SCNC: 4.6 MMOL/L (ref 3.5–5.2)
PROT SERPL-MCNC: 5.8 G/DL (ref 6–8.5)
RBC # BLD AUTO: 3.97 10*6/MM3 (ref 3.77–5.28)
SODIUM SERPL-SCNC: 139 MMOL/L (ref 136–145)
WBC NRBC COR # BLD: 18.4 10*3/MM3 (ref 3.4–10.8)

## 2023-10-07 PROCEDURE — 25010000002 HYDRALAZINE PER 20 MG: Performed by: HOSPITALIST

## 2023-10-07 PROCEDURE — 83690 ASSAY OF LIPASE: CPT

## 2023-10-07 PROCEDURE — 25010000002 PIPERACILLIN SOD-TAZOBACTAM PER 1 G: Performed by: INTERNAL MEDICINE

## 2023-10-07 PROCEDURE — 87040 BLOOD CULTURE FOR BACTERIA: CPT | Performed by: INTERNAL MEDICINE

## 2023-10-07 PROCEDURE — 80053 COMPREHEN METABOLIC PANEL: CPT | Performed by: STUDENT IN AN ORGANIZED HEALTH CARE EDUCATION/TRAINING PROGRAM

## 2023-10-07 PROCEDURE — 25010000002 CEFTRIAXONE PER 250 MG: Performed by: INTERNAL MEDICINE

## 2023-10-07 PROCEDURE — 25010000002 ENOXAPARIN PER 10 MG: Performed by: HOSPITALIST

## 2023-10-07 PROCEDURE — 85025 COMPLETE CBC W/AUTO DIFF WBC: CPT | Performed by: INTERNAL MEDICINE

## 2023-10-07 PROCEDURE — 25810000003 LACTATED RINGERS PER 1000 ML: Performed by: INTERNAL MEDICINE

## 2023-10-07 RX ORDER — ENOXAPARIN SODIUM 100 MG/ML
40 INJECTION SUBCUTANEOUS 2 TIMES DAILY
Status: DISCONTINUED | OUTPATIENT
Start: 2023-10-07 | End: 2023-10-10 | Stop reason: HOSPADM

## 2023-10-07 RX ORDER — HYDRALAZINE HYDROCHLORIDE 20 MG/ML
10 INJECTION INTRAMUSCULAR; INTRAVENOUS EVERY 6 HOURS PRN
Status: DISCONTINUED | OUTPATIENT
Start: 2023-10-07 | End: 2023-10-10 | Stop reason: HOSPADM

## 2023-10-07 RX ORDER — BISOPROLOL FUMARATE 5 MG/1
5 TABLET, FILM COATED ORAL
Status: DISCONTINUED | OUTPATIENT
Start: 2023-10-07 | End: 2023-10-10 | Stop reason: HOSPADM

## 2023-10-07 RX ADMIN — OXYCODONE HYDROCHLORIDE 7.5 MG: 5 TABLET ORAL at 00:52

## 2023-10-07 RX ADMIN — PIPERACILLIN AND TAZOBACTAM 4.5 G: 4; .5 INJECTION, POWDER, FOR SOLUTION INTRAVENOUS at 21:48

## 2023-10-07 RX ADMIN — URSODIOL 500 MG: 500 TABLET, FILM COATED ORAL at 21:47

## 2023-10-07 RX ADMIN — SODIUM CHLORIDE, POTASSIUM CHLORIDE, SODIUM LACTATE AND CALCIUM CHLORIDE 75 ML/HR: 600; 310; 30; 20 INJECTION, SOLUTION INTRAVENOUS at 21:39

## 2023-10-07 RX ADMIN — OXYCODONE HYDROCHLORIDE 7.5 MG: 5 TABLET ORAL at 05:21

## 2023-10-07 RX ADMIN — PANTOPRAZOLE SODIUM 40 MG: 40 INJECTION, POWDER, LYOPHILIZED, FOR SOLUTION INTRAVENOUS at 05:13

## 2023-10-07 RX ADMIN — Medication 10 ML: at 21:47

## 2023-10-07 RX ADMIN — ENOXAPARIN SODIUM 40 MG: 40 INJECTION, SOLUTION SUBCUTANEOUS at 17:16

## 2023-10-07 RX ADMIN — PIPERACILLIN AND TAZOBACTAM 4.5 G: 4; .5 INJECTION, POWDER, FOR SOLUTION INTRAVENOUS at 15:27

## 2023-10-07 RX ADMIN — BISOPROLOL FUMARATE 5 MG: 5 TABLET ORAL at 15:27

## 2023-10-07 RX ADMIN — Medication 10 ML: at 08:16

## 2023-10-07 RX ADMIN — HYDRALAZINE HYDROCHLORIDE 10 MG: 20 INJECTION INTRAMUSCULAR; INTRAVENOUS at 17:16

## 2023-10-07 RX ADMIN — CEFTRIAXONE 2000 MG: 2 INJECTION, POWDER, FOR SOLUTION INTRAMUSCULAR; INTRAVENOUS at 12:53

## 2023-10-07 RX ADMIN — URSODIOL 500 MG: 500 TABLET, FILM COATED ORAL at 08:15

## 2023-10-07 NOTE — PLAN OF CARE
Goal Outcome Evaluation:         VSS through the night. Room air. Medical monitor overflow. Payton remains in place for urinary retention. A&Ox4.

## 2023-10-07 NOTE — PLAN OF CARE
Goal Outcome Evaluation:      Patient alert and oriented times 4. This nurse received report from Constance MATHIS RN at 1600. Patient's blood pressure elevated. Reached out to physician for orders. Orders for hydralazine were put in. Reached out to lab and Blood cultures were drawn.

## 2023-10-07 NOTE — PROGRESS NOTES
Infectious Diseases Progress Note      LOS: 2 days   Patient Care Team:  Alessandra Barbour MD as PCP - General (Family Medicine)    Chief Complaint: No complaints today    Subjective     The patient had no fever during the last 24 hours.  She remained hemodynamically stable.  Denied having any new complaints today.    Review of Systems:   Review of Systems   Constitutional: Negative.    HENT: Negative.     Eyes: Negative.    Respiratory: Negative.     Cardiovascular: Negative.    Gastrointestinal: Negative.    Genitourinary: Negative.    Musculoskeletal: Negative.    Skin: Negative.    Neurological: Negative.    Hematological: Negative.    Psychiatric/Behavioral: Negative.          Objective     Vital Signs  Temp:  [97.6 øF (36.4 øC)-98.4 øF (36.9 øC)] 98.2 øF (36.8 øC)  Heart Rate:  [] 71  Resp:  [18-24] 18  BP: (112-144)/(72-96) 142/88    Physical Exam:  Physical Exam  Vitals and nursing note reviewed.   Constitutional:       Appearance: She is well-developed.   HENT:      Head: Normocephalic and atraumatic.   Eyes:      Pupils: Pupils are equal, round, and reactive to light.   Cardiovascular:      Rate and Rhythm: Normal rate and regular rhythm.      Heart sounds: Normal heart sounds.   Pulmonary:      Effort: Pulmonary effort is normal. No respiratory distress.      Breath sounds: Normal breath sounds. No wheezing or rales.   Abdominal:      General: Bowel sounds are normal. There is no distension.      Palpations: Abdomen is soft. There is no mass.      Tenderness: There is no abdominal tenderness. There is no guarding or rebound.   Musculoskeletal:         General: No deformity. Normal range of motion.      Cervical back: Normal range of motion and neck supple.   Skin:     General: Skin is warm.      Findings: No erythema or rash.   Neurological:      Mental Status: She is alert and oriented to person, place, and time.      Cranial Nerves: No cranial nerve deficit.          Results Review:    I have  reviewed all clinical data, test, lab, and imaging results.     Radiology  FL ERCP pancreatic and biliary ducts    Result Date: 10/6/2023  [place] ERCP PANCREATIC AND BILIARY DUCTS Date of Exam: 10/6/2023 3:00 PM EDT Indication: ENDOSCOPIC RETROGRADE CHOLANGIOPANCREATOGRAPHY. Comparison: None available. Technique:  A series of radiographic digital spot films were obtained in conjunction with a endoscopic catheterization of the biliary and pancreatic ductal system, performed by the gastroenterologist. Fluoroscopic Time: 1.1 Findings: The endoscopy scope is noted. There is wire cannulation of the common bile duct. Surgical clips are noted suggesting prior cholecystectomy. Later images demonstrate contrast opacification of the common bile duct and hepatic ducts. The common bile duct appears distended. Initially there are multiple filling defects which may relate to air bubbles or stones. Following balloon deployment and sweep, no definitive residual filling defects are identified. There is no extravasation of contrast.     Impression: Limited intraoperative fluoroscopic views were obtained for surgical support during ERCP. Recommend correlation with real-time findings at the time of the procedure. Electronically Signed: James Barrios MD  10/6/2023 4:00 PM EDT  Workstation ID: [id number]     Cardiology    Laboratory    Results from last 7 days   Lab Units 10/07/23  0518 10/06/23  0639 10/05/23  1106   WBC 10*3/mm3 18.40* 26.50* 11.50*   HEMOGLOBIN g/dL 11.2* 11.9* 13.9   HEMATOCRIT % 34.2 36.2 41.3   PLATELETS 10*3/mm3 104* 106* 146     Results from last 7 days   Lab Units 10/07/23  0518 10/06/23  0639 10/05/23  2154 10/05/23  1106   SODIUM mmol/L 139 141 139 139   POTASSIUM mmol/L 4.6 4.4 3.3* 3.6   CHLORIDE mmol/L 108* 106 105 104   CO2 mmol/L 22.0 23.0 22.0 17.0*   BUN mg/dL 21* 27* 28* 22*   CREATININE mg/dL 0.64 1.30* 1.69* 1.43*   GLUCOSE mg/dL 102* 78 99 89   ALBUMIN g/dL 2.6* 3.3* 2.9* 3.1*   BILIRUBIN mg/dL 1.2 3.7*  4.5* 6.9*   ALK PHOS U/L 240* 273* 277* 934*   AST (SGOT) U/L 46* 94* 107* 154*   ALT (SGPT) U/L 83* 120* 141* 172*   LIPASE U/L 8*  --   --  11*   CALCIUM mg/dL 9.3 8.8 9.0 9.3                 Microbiology   Microbiology Results (last 10 days)       Procedure Component Value - Date/Time    Respiratory Panel PCR w/COVID-19(SARS-CoV-2) [facility]IE/[facility] In-House, NP Swab in UTM/VTM, 3-4 HR TAT - Swab, Nasopharynx [[id number]]  (Normal) Collected: 10/05/23 2051    Lab Status: Final result Specimen: Swab from Nasopharynx Updated: 10/05/23 2149     ADENOVIRUS, PCR Not Detected     Coronavirus 229E Not Detected     Coronavirus HKU1 Not Detected     Coronavirus NL63 Not Detected     Coronavirus OC43 Not Detected     COVID19 Not Detected     Human Metapneumovirus Not Detected     Human Rhinovirus/Enterovirus Not Detected     Influenza A PCR Not Detected     Influenza B PCR Not Detected     Parainfluenza Virus 1 Not Detected     Parainfluenza Virus 2 Not Detected     Parainfluenza Virus 3 Not Detected     Parainfluenza Virus 4 Not Detected     RSV, PCR Not Detected     Bordetella pertussis pcr Not Detected     Bordetella parapertussis PCR Not Detected     Chlamydophila pneumoniae PCR Not Detected     Mycoplasma pneumo by PCR Not Detected    Narrative:      In the setting of a positive respiratory panel with a viral infection PLUS a negative procalcitonin without other underlying concern for bacterial infection, consider observing off antibiotics or discontinuation of antibiotics and continue supportive care. If the respiratory panel is positive for atypical bacterial infection (Bordetella pertussis, Chlamydophila pneumoniae, or Mycoplasma pneumoniae), consider antibiotic de-escalation to target atypical bacterial infection.    MRSA Screen, PCR (Inpatient) - Swab, Nares [[id number]]  (Abnormal) Collected: 10/05/23 2051    Lab Status: Final result Specimen: Swab from Nares Updated: 10/05/23 2218     MRSA PCR MRSA  Detected    Narrative:      The negative predictive value of this diagnostic test is high and should only be used to consider de-escalating anti-MRSA therapy. A positive result may indicate colonization with MRSA and must be correlated clinically.    Blood Culture - Blood, Arm, Left [[id number]]  (Abnormal) Collected: 10/05/23 1238    Lab Status: Preliminary result Specimen: Blood from Arm, Left Updated: 10/07/23 1023     Blood Culture Gram Positive Cocci     Isolated from Aerobic Bottle     Gram Stain Aerobic Bottle Gram positive cocci in pairs and chains      --     Comment: The previously reported stain Aerobic Bottle Gram positive bacilli is no longer being reported.       Blood Culture - Blood, Arm, Right [[id number]]  (Abnormal) Collected: 10/05/23 1109    Lab Status: Preliminary result Specimen: Blood from Arm, Right Updated: 10/07/23 0635     Blood Culture Escherichia coli     Isolated from Anaerobic Bottle     Gram Stain Anaerobic Bottle Gram negative bacilli    Narrative:      Less than seven (7) mL's of blood was collected.  Insufficient quantity may yield false negative results.    Blood Culture ID, PCR - Blood, Arm, Right [[id number]]  (Abnormal) Collected: 10/05/23 1109    Lab Status: Final result Specimen: Blood from Arm, Right Updated: 10/06/23 0825     BCID, PCR Escherichia coli. Identification by BCID2 PCR.     BOTTLE TYPE Anaerobic Bottle    Narrative:      No resistance genes detected.            Medication Review:       Schedule Meds  cefTRIAXone, 2,000 mg, Intravenous, Q24H  pantoprazole, 40 mg, Intravenous, Q AM  sodium chloride, 10 mL, Intravenous, Q12H  ursodiol, 500 mg, Oral, Q12H        Infusion Meds  lactated ringers, 75 mL/hr, Last Rate: 75 mL/hr (10/06/23 6875)        PRN Meds    Calcium Replacement - Follow Nurse / BPA Driven Protocol    Magnesium Standard Dose Replacement - Follow Nurse / BPA Driven Protocol    Morphine    ondansetron **OR** ondansetron    oxyCODONE    Phosphorus  Replacement - Follow Nurse / BPA Driven Protocol    Potassium Replacement - Follow Nurse / BPA Driven Protocol    sodium chloride    sodium chloride    sodium chloride    temazepam        Assessment & Plan       Antimicrobial Therapy   1.         2.        3.        4.        5.            Assessment    Polymicrobial bacteremia with E. coli and Enterococcus faecium.  Final susceptibilities are pending.  Most likely secondary to biliary source.    Resolved septic shock secondary to above    Elevated transaminase and hyperbilirubinemia.  Almost resolved.  Most likely secondary to passed biliary stone.  Patient s/p ERCP on October 6, 2023    S/p cholecystectomy in 1994      Plan    Discontinue IV ceftriaxone  Start Zosyn 4.5 g every 8 hours waiting on final blood culture results  Repeat 1 set of blood cultures today  If repeat blood cultures positive for Enterococcus species then consider 2D echo  A.m. labs        Teja Gillis MD  10/07/23  13:19 EDT    Note is dictated utilizing voice recognition software/Dragon

## 2023-10-08 LAB
ALBUMIN SERPL-MCNC: 2.8 G/DL (ref 3.5–5.2)
ALBUMIN/GLOB SERPL: 0.9 G/DL
ALP SERPL-CCNC: 261 U/L (ref 39–117)
ALT SERPL W P-5'-P-CCNC: 64 U/L (ref 1–33)
ANION GAP SERPL CALCULATED.3IONS-SCNC: 7 MMOL/L (ref 5–15)
AST SERPL-CCNC: 26 U/L (ref 1–32)
BACTERIA SPEC AEROBE CULT: ABNORMAL
BILIRUB SERPL-MCNC: 1.1 MG/DL (ref 0–1.2)
BUN SERPL-MCNC: 25 MG/DL (ref 6–20)
BUN/CREAT SERPL: 30.1 (ref 7–25)
CALCIUM SPEC-SCNC: 10 MG/DL (ref 8.6–10.5)
CHLORIDE SERPL-SCNC: 111 MMOL/L (ref 98–107)
CO2 SERPL-SCNC: 25 MMOL/L (ref 22–29)
CREAT SERPL-MCNC: 0.83 MG/DL (ref 0.57–1)
DEPRECATED RDW RBC AUTO: 42.4 FL (ref 37–54)
EGFRCR SERPLBLD CKD-EPI 2021: 83.4 ML/MIN/1.73
ERYTHROCYTE [DISTWIDTH] IN BLOOD BY AUTOMATED COUNT: 13.9 % (ref 12.3–15.4)
FERRITIN SERPL-MCNC: 197 NG/ML (ref 13–150)
FOLATE SERPL-MCNC: 11.4 NG/ML (ref 4.78–24.2)
GLOBULIN UR ELPH-MCNC: 3.2 GM/DL
GLUCOSE SERPL-MCNC: 99 MG/DL (ref 65–99)
GRAM STN SPEC: ABNORMAL
HCT VFR BLD AUTO: 35.4 % (ref 34–46.6)
HGB BLD-MCNC: 11.5 G/DL (ref 12–15.9)
IRON 24H UR-MRATE: 162 MCG/DL (ref 37–145)
IRON SATN MFR SERPL: 61 % (ref 20–50)
ISOLATED FROM: ABNORMAL
LARGE PLATELETS: ABNORMAL
LYMPHOCYTES # BLD MANUAL: 1.49 10*3/MM3 (ref 0.7–3.1)
LYMPHOCYTES NFR BLD MANUAL: 2 % (ref 5–12)
MAGNESIUM SERPL-MCNC: 2 MG/DL (ref 1.6–2.6)
MCH RBC QN AUTO: 28.3 PG (ref 26.6–33)
MCHC RBC AUTO-ENTMCNC: 32.4 G/DL (ref 31.5–35.7)
MCV RBC AUTO: 87.2 FL (ref 79–97)
MONOCYTES # BLD: 0.37 10*3/MM3 (ref 0.1–0.9)
NEUTROPHILS # BLD AUTO: 16.74 10*3/MM3 (ref 1.7–7)
NEUTROPHILS NFR BLD MANUAL: 82 % (ref 42.7–76)
NEUTS BAND NFR BLD MANUAL: 8 % (ref 0–5)
PHOSPHATE SERPL-MCNC: 2.3 MG/DL (ref 2.5–4.5)
PLATELET # BLD AUTO: 127 10*3/MM3 (ref 140–450)
PMV BLD AUTO: 10.6 FL (ref 6–12)
POTASSIUM SERPL-SCNC: 4.4 MMOL/L (ref 3.5–5.2)
PROT SERPL-MCNC: 6 G/DL (ref 6–8.5)
RBC # BLD AUTO: 4.05 10*6/MM3 (ref 3.77–5.28)
RBC MORPH BLD: NORMAL
SCAN SLIDE: NORMAL
SODIUM SERPL-SCNC: 143 MMOL/L (ref 136–145)
TIBC SERPL-MCNC: 265 MCG/DL (ref 298–536)
TOXIC GRANULATION: ABNORMAL
TRANSFERRIN SERPL-MCNC: 178 MG/DL (ref 200–360)
VARIANT LYMPHS NFR BLD MANUAL: 8 % (ref 19.6–45.3)
VIT B12 BLD-MCNC: 1941 PG/ML (ref 211–946)
WBC NRBC COR # BLD: 18.6 10*3/MM3 (ref 3.4–10.8)

## 2023-10-08 PROCEDURE — 82746 ASSAY OF FOLIC ACID SERUM: CPT | Performed by: HOSPITALIST

## 2023-10-08 PROCEDURE — 82607 VITAMIN B-12: CPT | Performed by: HOSPITALIST

## 2023-10-08 PROCEDURE — 97161 PT EVAL LOW COMPLEX 20 MIN: CPT

## 2023-10-08 PROCEDURE — 25010000002 HYDRALAZINE PER 20 MG: Performed by: HOSPITALIST

## 2023-10-08 PROCEDURE — 80053 COMPREHEN METABOLIC PANEL: CPT | Performed by: INTERNAL MEDICINE

## 2023-10-08 PROCEDURE — 82728 ASSAY OF FERRITIN: CPT | Performed by: HOSPITALIST

## 2023-10-08 PROCEDURE — 25010000002 ENOXAPARIN PER 10 MG: Performed by: HOSPITALIST

## 2023-10-08 PROCEDURE — 83735 ASSAY OF MAGNESIUM: CPT | Performed by: HOSPITALIST

## 2023-10-08 PROCEDURE — 84466 ASSAY OF TRANSFERRIN: CPT | Performed by: HOSPITALIST

## 2023-10-08 PROCEDURE — 25010000002 PIPERACILLIN SOD-TAZOBACTAM PER 1 G: Performed by: INTERNAL MEDICINE

## 2023-10-08 PROCEDURE — 83540 ASSAY OF IRON: CPT | Performed by: HOSPITALIST

## 2023-10-08 PROCEDURE — 85025 COMPLETE CBC W/AUTO DIFF WBC: CPT | Performed by: INTERNAL MEDICINE

## 2023-10-08 PROCEDURE — 84100 ASSAY OF PHOSPHORUS: CPT | Performed by: HOSPITALIST

## 2023-10-08 PROCEDURE — 85007 BL SMEAR W/DIFF WBC COUNT: CPT | Performed by: INTERNAL MEDICINE

## 2023-10-08 RX ADMIN — Medication 10 ML: at 20:25

## 2023-10-08 RX ADMIN — PANTOPRAZOLE SODIUM 40 MG: 40 INJECTION, POWDER, LYOPHILIZED, FOR SOLUTION INTRAVENOUS at 05:21

## 2023-10-08 RX ADMIN — PIPERACILLIN AND TAZOBACTAM 4.5 G: 4; .5 INJECTION, POWDER, FOR SOLUTION INTRAVENOUS at 20:25

## 2023-10-08 RX ADMIN — BISOPROLOL FUMARATE 5 MG: 5 TABLET ORAL at 09:15

## 2023-10-08 RX ADMIN — ENOXAPARIN SODIUM 40 MG: 40 INJECTION, SOLUTION SUBCUTANEOUS at 20:25

## 2023-10-08 RX ADMIN — Medication 10 ML: at 09:38

## 2023-10-08 RX ADMIN — ENOXAPARIN SODIUM 40 MG: 40 INJECTION, SOLUTION SUBCUTANEOUS at 05:21

## 2023-10-08 RX ADMIN — URSODIOL 500 MG: 500 TABLET, FILM COATED ORAL at 09:15

## 2023-10-08 RX ADMIN — URSODIOL 500 MG: 500 TABLET, FILM COATED ORAL at 20:25

## 2023-10-08 RX ADMIN — PIPERACILLIN AND TAZOBACTAM 4.5 G: 4; .5 INJECTION, POWDER, FOR SOLUTION INTRAVENOUS at 13:55

## 2023-10-08 RX ADMIN — PIPERACILLIN AND TAZOBACTAM 4.5 G: 4; .5 INJECTION, POWDER, FOR SOLUTION INTRAVENOUS at 04:43

## 2023-10-08 RX ADMIN — HYDRALAZINE HYDROCHLORIDE 10 MG: 20 INJECTION INTRAMUSCULAR; INTRAVENOUS at 13:55

## 2023-10-08 NOTE — PLAN OF CARE
Goal Outcome Evaluation:  Plan of Care Reviewed With: patient           Outcome Evaluation: Pt is a 52 YO F POD endoscopic retrograde cholangiopancreograhy with baloon clearance bile duct. Pt reports she lives at home with spouse, typically is independent with all ADLs, ambulation without AD and had no recent falls. Pt has 10 steps to navigate in home but generally has no difficulty managing. 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 Pt appears near functional baseline and recommendation is return juan antonio with family assist.      Anticipated Discharge Disposition (PT): home with assist

## 2023-10-08 NOTE — PROGRESS NOTES
[facility] Medicine Services   Daily Progress Note    Patient Name: Idalia Oliver  : 1970  MRN: [id number]  Primary Care Physician:  Alessandra Barbour MD  Date of admission: 10/5/2023  Date and Time of Service:       Subjective      Chief Complaint:     Patient feeling significantly better today  No chills or sweats  Mild nausea but no vomiting  No chest pain or SOB  No new complaints           Objective      Vitals:   Temp:  [97.6 øF (36.4 øC)-98.6 øF (37 øC)] 97.8 øF (36.6 øC)  Heart Rate:  [72-84] 75  Resp:  [13-23] 13  BP: (122-170)/() 152/102    Physical Exam  General: No acute distress  HEENT: Neck supple, normal oral mucosa, no masses, no lymphadenopathy  Lungs: Clear bilaterally, no wheezing, no crackles, no rhonchi. Equal excursions.   CV - Normal S1/S2, no murmur, regular rate and rhythm   Abdomen - Soft, nontender, nondistended, normal bowel sounds  Extremities - no edema, no erythema  Neuro - No focal weakness, normal sensation  Psych - Alert and oriented x3  Skin - no wounds or lesions.          Result Review    Result Review:  I have personally reviewed the results from the time of this admission to 10/8/2023 14:14 EDT and agree with these findings:  [x]  Laboratory  [x]  Microbiology  [x]  Radiology  [x]  EKG/Telemetry   [x]  Cardiology/Vascular   []  Pathology  [x]  Old records  []  Other:  Most notable findings include:           Assessment & Plan      Brief Patient Summary:  Idalia Oliver is a 53 y.o. female who       bisoprolol, 5 mg, Oral, Q24H  enoxaparin, 40 mg, Subcutaneous, BID  pantoprazole, 40 mg, Intravenous, Q AM  piperacillin-tazobactam, 4.5 g, Intravenous, Q8H  sodium chloride, 10 mL, Intravenous, Q12H  ursodiol, 500 mg, Oral, Q12H               Active Hospital Problems:  Active Hospital Problems    Diagnosis     **Septic shock     RUQ abdominal pain     Elevated liver enzymes      Plan:     Septic shock  - E Coli Bacteremia  - s/p Pressors d/c  - suspected  biliary source  - Continue IV Abx per ID - Ceftriaxone.   - Blood culture growing Enterococcus. Awaiting final results.    - Follow repeat Blood culture - if positive for Enterococcus then will need Echo    Elevated liver enzymes  - Elevated Bilirubin  - s/p ERCP 10/6 - no evidence of stone or debis, likely passed stones  - monitor     CONCEPCIÓN  - Bisoprolol and HCTZ on hold  - resolved. D/c fluids     HTN  - resume Bisoprolol   - BP elevated. If remains elevated will resume home HCTZ     Acute thrombocytopenia  - likely related to sepsis. Monitor. Trending up    Normocytic anemia - seems to be anemia of chronic disease. OP follow up    Morbid Obesity - Body mass index is 45.07 kg/mý.           DVT prophylaxis:  Medical and mechanical DVT prophylaxis orders are present.    CODE STATUS:    Level Of Support Discussed With: Patient  Code Status (Patient has no pulse and is not breathing): CPR (Attempt to Resuscitate)  Medical Interventions (Patient has pulse or is breathing): Full Support      Disposition:  I expect patient to be discharged .    This patient has been  and discussed with . 10/08/23      Electronically signed by Navi Vasquez MD, 10/08/23, 14:14 EDT.  [facility] Hospitalist Team

## 2023-10-08 NOTE — PROGRESS NOTES
Infectious Diseases Progress Note      LOS: 3 days   Patient Care Team:  Alessandra Barbour MD as PCP - General (Family Medicine)    Chief Complaint: No complaints today    Subjective     The patient had no fever during the last 24 hours.  She remained hemodynamically stable.  Patient has a cough due to sinus issues    Review of Systems:   Review of Systems   Constitutional: Negative.    HENT: Negative.     Eyes: Negative.    Respiratory:  Positive for cough.    Cardiovascular: Negative.    Gastrointestinal: Negative.    Genitourinary: Negative.    Musculoskeletal: Negative.    Skin: Negative.    Neurological: Negative.    Hematological: Negative.    Psychiatric/Behavioral: Negative.          Objective     Vital Signs  Temp:  [97.6 øF (36.4 øC)-98.6 øF (37 øC)] 97.8 øF (36.6 øC)  Heart Rate:  [72-84] 75  Resp:  [13-23] 13  BP: (122-170)/() 152/102    Physical Exam:  Physical Exam  Vitals and nursing note reviewed.   Constitutional:       Appearance: She is well-developed.   HENT:      Head: Normocephalic and atraumatic.   Eyes:      Pupils: Pupils are equal, round, and reactive to light.   Cardiovascular:      Rate and Rhythm: Normal rate and regular rhythm.      Heart sounds: Normal heart sounds.   Pulmonary:      Effort: Pulmonary effort is normal. No respiratory distress.      Breath sounds: Normal breath sounds. No wheezing or rales.   Abdominal:      General: Bowel sounds are normal. There is no distension.      Palpations: Abdomen is soft. There is no mass.      Tenderness: There is no abdominal tenderness. There is no guarding or rebound.   Musculoskeletal:         General: No deformity. Normal range of motion.      Cervical back: Normal range of motion and neck supple.   Skin:     General: Skin is warm.      Findings: No erythema or rash.   Neurological:      Mental Status: She is alert and oriented to person, place, and time.      Cranial Nerves: No cranial nerve deficit.          Results Review:    I  have reviewed all clinical data, test, lab, and imaging results.     Radiology  No Radiology Exams Resulted Within Past 24 Hours    Cardiology    Laboratory    Results from last 7 days   Lab Units 10/08/23  0442 10/07/23  0518 10/06/23  0639 10/05/23  1106   WBC 10*3/mm3 18.60* 18.40* 26.50* 11.50*   HEMOGLOBIN g/dL 11.5* 11.2* 11.9* 13.9   HEMATOCRIT % 35.4 34.2 36.2 41.3   PLATELETS 10*3/mm3 127* 104* 106* 146     Results from last 7 days   Lab Units 10/08/23  0442 10/07/23  0518 10/06/23  0639 10/05/23  2154 10/05/23  1106   SODIUM mmol/L 143 139 141 139 139   POTASSIUM mmol/L 4.4 4.6 4.4 3.3* 3.6   CHLORIDE mmol/L 111* 108* 106 105 104   CO2 mmol/L 25.0 22.0 23.0 22.0 17.0*   BUN mg/dL 25* 21* 27* 28* 22*   CREATININE mg/dL 0.83 0.64 1.30* 1.69* 1.43*   GLUCOSE mg/dL 99 102* 78 99 89   ALBUMIN g/dL 2.8* 2.6* 3.3* 2.9* 3.1*   BILIRUBIN mg/dL 1.1 1.2 3.7* 4.5* 6.9*   ALK PHOS U/L 261* 240* 273* 277* 934*   AST (SGOT) U/L 26 46* 94* 107* 154*   ALT (SGPT) U/L 64* 83* 120* 141* 172*   LIPASE U/L  --  8*  --   --  11*   CALCIUM mg/dL 10.0 9.3 8.8 9.0 9.3                 Microbiology   Microbiology Results (last 10 days)       Procedure Component Value - Date/Time    Respiratory Panel PCR w/COVID-19(SARS-CoV-2) [facility]/ANN[facility] In-House, NP Swab in UTM/VTM, 3-4 HR TAT - Swab, Nasopharynx [[id number]]  (Normal) Collected: 10/05/23 2051    Lab Status: Final result Specimen: Swab from Nasopharynx Updated: 10/05/23 2149     ADENOVIRUS, PCR Not Detected     Coronavirus 229E Not Detected     Coronavirus HKU1 Not Detected     Coronavirus NL63 Not Detected     Coronavirus OC43 Not Detected     COVID19 Not Detected     Human Metapneumovirus Not Detected     Human Rhinovirus/Enterovirus Not Detected     Influenza A PCR Not Detected     Influenza B PCR Not Detected     Parainfluenza Virus 1 Not Detected     Parainfluenza Virus 2 Not Detected     Parainfluenza Virus 3 Not Detected     Parainfluenza Virus 4 Not Detected      RSV, PCR Not Detected     Bordetella pertussis pcr Not Detected     Bordetella parapertussis PCR Not Detected     Chlamydophila pneumoniae PCR Not Detected     Mycoplasma pneumo by PCR Not Detected    Narrative:      In the setting of a positive respiratory panel with a viral infection PLUS a negative procalcitonin without other underlying concern for bacterial infection, consider observing off antibiotics or discontinuation of antibiotics and continue supportive care. If the respiratory panel is positive for atypical bacterial infection (Bordetella pertussis, Chlamydophila pneumoniae, or Mycoplasma pneumoniae), consider antibiotic de-escalation to target atypical bacterial infection.    MRSA Screen, PCR (Inpatient) - Swab, Nares [[id number]]  (Abnormal) Collected: 10/05/23 2051    Lab Status: Final result Specimen: Swab from Nares Updated: 10/05/23 2218     MRSA PCR MRSA Detected    Narrative:      The negative predictive value of this diagnostic test is high and should only be used to consider de-escalating anti-MRSA therapy. A positive result may indicate colonization with MRSA and must be correlated clinically.    Blood Culture - Blood, Arm, Left [[id number]]  (Abnormal) Collected: 10/05/23 1238    Lab Status: Preliminary result Specimen: Blood from Arm, Left Updated: 10/08/23 1058     Blood Culture Enterococcus species     Comment:   Infectious disease consultation is highly recommended.        Isolated from Aerobic Bottle     Gram Stain Aerobic Bottle Gram positive cocci in pairs and chains      Anaerobic Bottle Gram negative bacilli      --     Comment: The previously reported stain Aerobic Bottle Gram positive bacilli is no longer being reported.       Blood Culture ID, PCR - Blood, Arm, Left [[id number]]  (Abnormal) Collected: 10/05/23 1238    Lab Status: Final result Specimen: Blood from Arm, Left Updated: 10/07/23 1322     BCID, PCR Enterococcus faecium. Irene/B (vancomycin resistance gene) not detected.  Identification by BCID2 PCR.     BOTTLE TYPE Aerobic Bottle    Narrative:      Infectious disease consultation is highly recommended to rule out distant foci of infection.    Blood Culture - Blood, Arm, Right [[id number]]  (Abnormal)  (Susceptibility) Collected: 10/05/23 1109    Lab Status: Final result Specimen: Blood from Arm, Right Updated: 10/08/23 0614     Blood Culture Escherichia coli     Isolated from Anaerobic Bottle     Gram Stain Anaerobic Bottle Gram negative bacilli    Narrative:      Less than seven (7) mL's of blood was collected.  Insufficient quantity may yield false negative results.    Susceptibility        Escherichia coli      HUEY      Ampicillin Susceptible      Ampicillin + Sulbactam Susceptible      Cefepime Susceptible      Ceftazidime Susceptible      Ceftriaxone Susceptible      Gentamicin Susceptible      Levofloxacin Susceptible      Piperacillin + Tazobactam Susceptible      Trimethoprim + Sulfamethoxazole Susceptible                       Susceptibility Comments       Escherichia coli    Cefazolin sensitivity will not be reported for Enterobacteriaceae in non-urine isolates. If cefazolin is preferred, please call the microbiology lab to request an E-test.  With the exception of urinary-sourced infections, aminoglycosides should not be used as monotherapy.               Blood Culture ID, PCR - Blood, Arm, Right [[id number]]  (Abnormal) Collected: 10/05/23 1109    Lab Status: Final result Specimen: Blood from Arm, Right Updated: 10/06/23 0825     BCID, PCR Escherichia coli. Identification by BCID2 PCR.     BOTTLE TYPE Anaerobic Bottle    Narrative:      No resistance genes detected.            Medication Review:       Schedule Meds  bisoprolol, 5 mg, Oral, Q24H  enoxaparin, 40 mg, Subcutaneous, BID  pantoprazole, 40 mg, Intravenous, Q AM  piperacillin-tazobactam, 4.5 g, Intravenous, Q8H  sodium chloride, 10 mL, Intravenous, Q12H  ursodiol, 500 mg, Oral, Q12H        Infusion Meds          PRN Meds    Calcium Replacement - Follow Nurse / BPA Driven Protocol    hydrALAZINE    Magnesium Standard Dose Replacement - Follow Nurse / BPA Driven Protocol    Morphine    ondansetron **OR** ondansetron    oxyCODONE    Phosphorus Replacement - Follow Nurse / BPA Driven Protocol    Potassium Replacement - Follow Nurse / BPA Driven Protocol    sodium chloride    sodium chloride    sodium chloride    temazepam        Assessment & Plan       Antimicrobial Therapy   1.  IV Zosyn        2.        3.        4.        5.            Assessment    Polymicrobial bacteremia with E. coli and Enterococcus faecium.  Final susceptibilities are pending.  Most likely secondary to biliary source.    Resolved septic shock secondary to above    Elevated transaminase and hyperbilirubinemia.  Almost resolved.  Most likely secondary to passed biliary stone.  Patient s/p ERCP on October 6, 2023    S/p cholecystectomy in 1994      Plan    Continue Zosyn 4.5 g every 8 hours waiting on final blood culture results.  Patient will need 2 weeks of IV antimicrobial therapy  Patient already has a PICC line-please remove once IV antibiotics are completed  Repeat 1 set of blood cultures today  If repeat blood cultures positive for Enterococcus species then consider 2D echo  Continue supportive care  A.m. labs  Case discussed with patient and family member at bedside  Case discussed with CHRIS Banuelos, APRN  10/08/23  14:18 EDT    Note is dictated utilizing voice recognition software/Dragon

## 2023-10-08 NOTE — PLAN OF CARE
Goal Outcome Evaluation:           Progress: improving  Outcome Evaluation: Patient is alert and oriented, able to make needs known. Has had no complaints of pain or discomfort at this time.

## 2023-10-08 NOTE — THERAPY EVALUATION
Patient Name: Idalia Oliver  : 1970    MRN: [id number]                              Today's Date: 10/8/2023       Admit Date: 10/5/2023    Visit Dx:     ICD-10-CM ICD-9-CM   1. Severe sepsis  A41.9 038.9    R65.20 995.92   2. Septic shock  A41.9 038.9    R65.21 785.52     995.92   3. RUQ abdominal pain  R10.11 789.01   4. Elevated liver enzymes  R74.8 790.5     Patient Active Problem List   Diagnosis    Septic shock    Hypertension    Body mass index (BMI) 40.0-44.9, adult    RUQ abdominal pain    Elevated liver enzymes     Past Medical History:   Diagnosis Date    Hypertension     Obesity      Past Surgical History:   Procedure Laterality Date    CHOLECYSTECTOMY      DENTAL PROCEDURE        General Information       Row Name 10/08/23 172          Physical Therapy Time and Intention    Document Type evaluation  -EL     Mode of Treatment individual therapy;physical therapy  -EL       Row Name 10/08/23 172          General Information    Prior Level of Function independent:;all household mobility;ADL's;work;driving  -EL       Row Name 10/08/23 172          Living Environment    People in Home spouse  -EL       Row Name 10/08/23 172          Home Main Entrance    Number of Stairs, Main Entrance three  -EL       Row Name 10/08/23 172          Stairs Within Home, Primary    Number of Stairs, Within Home, Primary none  -EL       Row Name 10/08/23 172          Cognition    Orientation Status (Cognition) oriented x 4  -EL               User Key  (r) = Recorded By, (t) = Taken By, (c) = Cosigned By      Initials Name Provider Type    Bulmaro Ford PT Physical Therapist                   Mobility       Row Name 10/08/23 1727          Bed Mobility    Bed Mobility bed mobility (all) activities  -EL     All Activities, [place] (Bed Mobility) independent  -EL       Row Name 10/08/23 1727          Bed-Chair Transfer    Bed-Chair [place] (Transfers) independent  -EL       Row Name 10/08/23 1727           Sit-Stand Transfer    Sit-Stand [place] (Transfers) independent  -       Row Name 10/08/23 1727          Gait/Stairs (Locomotion)    [place] Level (Gait) independent  -EL     Distance in Feet (Gait) 100  -EL               User Key  (r) = Recorded By, (t) = Taken By, (c) = Cosigned By      Initials Name Provider Type    Bulmaro Ford PT Physical Therapist                   Obj/Interventions       Row Name 10/08/23 1730          Range of Motion Comprehensive    General Range of Motion bilateral lower extremity ROM WFL  -       Row Name 10/08/23 1730          Strength Comprehensive (MMT)    General Manual Muscle Testing (MMT) Assessment no strength deficits identified  -[facility] Name 10/08/23 1730          Balance    Balance Assessment sitting static balance;standing static balance;standing dynamic balance  -EL     Static Sitting Balance independent  -EL     Static Standing Balance supervision  -EL     Dynamic Standing Balance supervision  -       Row Name 10/08/23 1730          Sensory Assessment (Somatosensory)    Sensory Assessment (Somatosensory) sensation intact  -               User Key  (r) = Recorded By, (t) = Taken By, (c) = Cosigned By      Initials Name Provider Type    Bulmaro Ford PT Physical Therapist                   Goals/Plan    No documentation.                  Clinical Impression       Row Name 10/08/23 1732          Pain    Pretreatment Pain Rating 0/10 - no pain  -EL     Posttreatment Pain Rating 0/10 - no pain  -EL       Row Name 10/08/23 1732          Plan of Care Review    Plan of Care Reviewed With patient  -EL     Outcome Evaluation Pt is a 54 YO F POD endoscopic retrograde cholangiopancreograhy with baloon clearance bile duct. Pt reports she lives at home with spouse, typically is independent with all ADLs, ambulation without AD and had no recent falls. Pt has 10 steps to navigate in home but generally has no difficulty managing. 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 Pt this date performs all mobility without assistance and without LOB or increase in pain. Pt appears near functional baseline and recommendation is return juan antonio with family assist.  -       Row Name 10/08/23 1732          Therapy Assessment/Plan (PT)    Criteria for Skilled Interventions Met (PT) no problems identified which require skilled intervention  -EL     Therapy Frequency (PT) evaluation only  -       Row Name 10/08/23 1732          Vital Signs    O2 Delivery Pre Treatment room air  -EL     O2 Delivery Intra Treatment room air  -EL     O2 Delivery Post Treatment room air  -EL     Pre Patient Position Supine  -EL     Intra Patient Position Standing  -EL     Post Patient Position Sitting  -EL       Row Name 10/08/23 1732          Positioning and Restraints    Pre-Treatment Position in bed  -EL     Post Treatment Position bed  -EL     In Bed notified nsg;supine;call light within reach;encouraged to call for assist;exit alarm on  -EL               User Key  (r) = Recorded By, (t) = Taken By, (c) = Cosigned By      Initials Name Provider Type    EL Bulmaro Casiano, PT Physical Therapist                   Outcome Measures       Row Name 10/08/23 1739          How much help from another person do you currently need...    Turning from your back to your side while in flat bed without using bedrails? 4  -EL     Moving from lying on back to sitting on the side of a flat bed without bedrails? 4  -EL     Moving to and from a bed to a chair (including a wheelchair)? 4  -EL     Standing up from a chair using your arms (e.g., wheelchair, bedside chair)? 4  -EL     Climbing 3-5 steps with a railing? 4  -EL     To walk in hospital room? 4  -EL     AM-PAC 6 Clicks Score (PT) 24  -EL     Highest level of mobility 8 --> Walked 250 feet or more  -       Row Name 10/08/23 1739          Functional Assessment    Outcome Measure Options AM-PAC 6 Clicks Basic Mobility (PT)  -EL               User Key  (r) = Recorded  By, (t) = Taken By, (c) = Cosigned By      Initials Name Provider Type    Bulmaro Ford PT Physical Therapist                                 Physical Therapy Education       Title: PT OT SLP Therapies (Done)       Topic: Physical Therapy (Done)       Point: Mobility training (Done)       Learning Progress Summary             Patient Acceptance, E,TB, VU by  at 10/8/2023 1740                                         User Key       Initials Effective Dates Name Provider Type [facility] 06/23/20 -  Bulmaro Casiano PT Physical Therapist PT                  PT Recommendation and Plan     Plan of Care Reviewed With: patient  Outcome Evaluation: Pt is a 52 YO F POD endoscopic retrograde cholangiopancreograhy with baloon clearance bile duct. Pt reports she lives at home with spouse, typically is independent with all ADLs, ambulation without AD and had no recent falls. Pt has 10 steps to navigate in home but generally has no difficulty managing. 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 Pt appears near functional baseline and recommendation is return juan antonio with family assist.     Time Calculation:   PT Evaluation Complexity  History, PT Evaluation Complexity: no personal factors and/or comorbidities  Examination of Body Systems (PT Eval Complexity): 1-2 elements  Clinical Presentation (PT Evaluation Complexity): stable  Clinical Decision Making (PT Evaluation Complexity): low complexity  Overall Complexity (PT Evaluation Complexity): low complexity     PT Charges       Row Name 10/08/23 1741             Time Calculation    Start Time 0900  -EL      Stop Time 0914  -EL      Time Calculation (min) 14 min  -EL      PT Received On 10/08/23  -                User Key  (r) = Recorded By, (t) = Taken By, (c) = Cosigned By      Initials Name Provider Type    Bulmaro Ford PT Physical Therapist                  Therapy Charges for  Today       Code Description Service Date Service Provider Modifiers Qty    68250009120 HC PT EVAL LOW COMPLEXITY 3 10/8/2023 Bulmaro Casiano, PT GP 1            PT G-Codes  Outcome Measure Options: AM-PAC 6 Clicks Basic Mobility (PT)  AM-PAC 6 Clicks Score (PT): 24  PT Discharge Summary  Anticipated Discharge Disposition (PT): home with assist    Bulmaro Casiano PT  10/8/2023

## 2023-10-09 PROBLEM — R78.81 BACTEREMIA: Status: ACTIVE | Noted: 2023-10-09

## 2023-10-09 LAB
ALBUMIN SERPL-MCNC: 2.8 G/DL (ref 3.5–5.2)
ALBUMIN/GLOB SERPL: 1.1 G/DL
ALP SERPL-CCNC: 222 U/L (ref 39–117)
ALT SERPL W P-5'-P-CCNC: 42 U/L (ref 1–33)
ANION GAP SERPL CALCULATED.3IONS-SCNC: 9 MMOL/L (ref 5–15)
AST SERPL-CCNC: 20 U/L (ref 1–32)
BILIRUB SERPL-MCNC: 1 MG/DL (ref 0–1.2)
BUN SERPL-MCNC: 27 MG/DL (ref 6–20)
BUN/CREAT SERPL: 31.4 (ref 7–25)
CALCIUM SPEC-SCNC: 9.4 MG/DL (ref 8.6–10.5)
CHLORIDE SERPL-SCNC: 107 MMOL/L (ref 98–107)
CO2 SERPL-SCNC: 24 MMOL/L (ref 22–29)
CREAT SERPL-MCNC: 0.86 MG/DL (ref 0.57–1)
DEPRECATED RDW RBC AUTO: 43.3 FL (ref 37–54)
EGFRCR SERPLBLD CKD-EPI 2021: 79.9 ML/MIN/1.73
EOSINOPHIL # BLD MANUAL: 0.1 10*3/MM3 (ref 0–0.4)
EOSINOPHIL NFR BLD MANUAL: 1 % (ref 0.3–6.2)
ERYTHROCYTE [DISTWIDTH] IN BLOOD BY AUTOMATED COUNT: 14.1 % (ref 12.3–15.4)
GLOBULIN UR ELPH-MCNC: 2.5 GM/DL
GLUCOSE SERPL-MCNC: 79 MG/DL (ref 65–99)
HCT VFR BLD AUTO: 34 % (ref 34–46.6)
HGB BLD-MCNC: 11.2 G/DL (ref 12–15.9)
LYMPHOCYTES # BLD MANUAL: 2.75 10*3/MM3 (ref 0.7–3.1)
LYMPHOCYTES NFR BLD MANUAL: 5 % (ref 5–12)
MCH RBC QN AUTO: 28.6 PG (ref 26.6–33)
MCHC RBC AUTO-ENTMCNC: 32.9 G/DL (ref 31.5–35.7)
MCV RBC AUTO: 86.7 FL (ref 79–97)
MONOCYTES # BLD: 0.51 10*3/MM3 (ref 0.1–0.9)
NEUTROPHILS # BLD AUTO: 6.83 10*3/MM3 (ref 1.7–7)
NEUTROPHILS NFR BLD MANUAL: 67 % (ref 42.7–76)
PLATELET # BLD AUTO: 110 10*3/MM3 (ref 140–450)
PMV BLD AUTO: 10.3 FL (ref 6–12)
POTASSIUM SERPL-SCNC: 3.7 MMOL/L (ref 3.5–5.2)
PROT SERPL-MCNC: 5.3 G/DL (ref 6–8.5)
RBC # BLD AUTO: 3.92 10*6/MM3 (ref 3.77–5.28)
RBC MORPH BLD: NORMAL
SCAN SLIDE: NORMAL
SMALL PLATELETS BLD QL SMEAR: NORMAL
SODIUM SERPL-SCNC: 140 MMOL/L (ref 136–145)
VARIANT LYMPHS NFR BLD MANUAL: 27 % (ref 19.6–45.3)
WBC MORPH BLD: NORMAL
WBC NRBC COR # BLD: 10.2 10*3/MM3 (ref 3.4–10.8)

## 2023-10-09 PROCEDURE — 85025 COMPLETE CBC W/AUTO DIFF WBC: CPT | Performed by: INTERNAL MEDICINE

## 2023-10-09 PROCEDURE — 80053 COMPREHEN METABOLIC PANEL: CPT | Performed by: INTERNAL MEDICINE

## 2023-10-09 PROCEDURE — 85007 BL SMEAR W/DIFF WBC COUNT: CPT | Performed by: INTERNAL MEDICINE

## 2023-10-09 PROCEDURE — 25010000002 PIPERACILLIN SOD-TAZOBACTAM PER 1 G: Performed by: INTERNAL MEDICINE

## 2023-10-09 PROCEDURE — 25010000002 ENOXAPARIN PER 10 MG: Performed by: HOSPITALIST

## 2023-10-09 PROCEDURE — 25010000002 AMPICILLIN PER 500 MG: Performed by: INTERNAL MEDICINE

## 2023-10-09 RX ORDER — URSODIOL 500 MG/1
500 TABLET, FILM COATED ORAL EVERY 12 HOURS SCHEDULED
Qty: 60 TABLET | Refills: 0 | Status: SHIPPED | OUTPATIENT
Start: 2023-10-09 | End: 2023-11-08

## 2023-10-09 RX ADMIN — ENOXAPARIN SODIUM 40 MG: 40 INJECTION, SOLUTION SUBCUTANEOUS at 05:06

## 2023-10-09 RX ADMIN — Medication 20 ML: at 09:53

## 2023-10-09 RX ADMIN — Medication 10 ML: at 20:19

## 2023-10-09 RX ADMIN — AMPICILLIN INJECTION 2 G: 2 POWDER, FOR SOLUTION INTRAMUSCULAR; INTRAVENOUS at 18:24

## 2023-10-09 RX ADMIN — PIPERACILLIN AND TAZOBACTAM 4.5 G: 4; .5 INJECTION, POWDER, FOR SOLUTION INTRAVENOUS at 05:06

## 2023-10-09 RX ADMIN — Medication 10 ML: at 09:55

## 2023-10-09 RX ADMIN — PANTOPRAZOLE SODIUM 40 MG: 40 INJECTION, POWDER, LYOPHILIZED, FOR SOLUTION INTRAVENOUS at 05:06

## 2023-10-09 RX ADMIN — AMPICILLIN INJECTION 2 G: 2 POWDER, FOR SOLUTION INTRAMUSCULAR; INTRAVENOUS at 13:59

## 2023-10-09 RX ADMIN — BISOPROLOL FUMARATE 5 MG: 5 TABLET ORAL at 09:52

## 2023-10-09 RX ADMIN — AMPICILLIN INJECTION 2 G: 2 POWDER, FOR SOLUTION INTRAMUSCULAR; INTRAVENOUS at 22:04

## 2023-10-09 RX ADMIN — URSODIOL 500 MG: 500 TABLET, FILM COATED ORAL at 09:53

## 2023-10-09 RX ADMIN — URSODIOL 500 MG: 500 TABLET, FILM COATED ORAL at 20:19

## 2023-10-09 NOTE — DISCHARGE INSTR - APPOINTMENTS
Follow up with PCP in two weeks  [facility] for labs, weekly line dressings, and removal of line on completion of IV antibiotics.   [facility] will call you to set up first appointment.   [facility] for IV antibiotics, supplies,  and teaching.

## 2023-10-09 NOTE — DISCHARGE PLACEMENT REQUEST
"Idalia Oliver (53 y.o. Female)       Date of Birth   1970    Social Security Number       Address   [address] [place] [address]    Home Phone   [phone]    MRN   [id number]       Rastafarian   None    Marital Status                               Admission Date   10/5/23    Admission Type   Emergency    Admitting Provider   Navi Vasquez MD    Attending Provider   Navi Vasquez MD    Department, Room/Bed   [facility] CARE, 2101/1       Discharge Date       Discharge Disposition       Discharge Destination                                 Attending Provider: Navi Vasuqez MD    Allergies: No Known Allergies    Isolation: None   Infection: MRSA No Isolation this Admit (10/06/23)   Code Status: CPR    Ht: 160 cm (63\")   Wt: 115 kg (252 lb 6.8 oz)    Admission Cmt: None   Principal Problem: Septic shock [A41.9,R65.21]                   Active Insurance as of 10/5/2023       Primary Coverage       Payor Plan Insurance Group Employer/Plan Group    [facility] [employer] CROSS [employer] [facility] [employer] EMPL[employer]EE [id number]       Payor Plan Address Payor Plan Phone Number Payor Plan Fax Number Effective Dates    [address] [phone]  1/1/2020 - None Entered    [facility] [id number]         Subscriber Name Subscriber Birth Date Member ID       JESSICA OLIVER 8/14/1961 [id number]                     Emergency Contacts        (Rel.) Home Phone Work Phone Mobile Phone    JSESICA OLIVER (Spouse) [phone]    Jean Paul Oliver (Son) -- -- [phone]                 History & Physical        Shannan Ocasio APRN at 10/05/23 1643       Attestation signed by Rishi Shafer MD at 10/06/23 1053    I have personally reviewed all overnight events, labs and other data, reviewed all other pertinent notes, seen and examined this patient today. I have also reviewed the note by SANYA and pertinent changes are reflected in my today's progress " note.    Dr. Rishi Shafer MD MPH  Staff Intensivist  10/06/23  10:58 EDT                      Critical Care History and Physical     Idalia Oliver : 1970 MRN:[id number] LOS:0 ROOM: [facility]     Reason for admission: Sepsis     Assessment / Plan     Septic Shock: ( WBC>12 or <4 or >10% bands, Temp > 100.4 or < 96.8, HR>90, Lactic acid>2, and MAP<65 or SBP<90 )  -Etiology unclear, differentials include pancreatitis or liver disease, ?VIGIL/ Hepatitis  -Blood cultures pending  -UA negative  -RVP pending  -MRSA swab pending  -Started on Levophed  -Persistent hypotension in spite of adequate fluid resuscitation  -C/w additional fluids as needed. Avoid fluid overload  -Titrate vasopressors for a target MAP of 65  -Continue cefepime and flagyl    Elevated LFTs  -  -  -Liver US pending  -Hepatitis panel pending    Essential hypertension  -Currently hypotensive, requiring Levophed  -Resume home antihypertensives when clinically appropriate    Obesity  -BMI 43.05 kg  -Counseled on lifestyle modifications       Level Of Support Discussed With: Patient  Code Status (Patient has no pulse and is not breathing): CPR (Attempt to Resuscitate)  Medical Interventions (Patient has pulse or is breathing): Full Support       Nutrition:   Diet: Regular/House Diet; Texture: Regular Texture (IDDSI 7); Fluid Consistency: Thin (IDDSI 0)     DVT prophylaxis:  Medical DVT prophylaxis orders are present.     History of Present illness     Idalia Oliver is a 53 y.o. female with PMH of hypertension, obesity presented to the hospital for abdominal pain, diarrhea and nausea x24 hours, and was admitted with a principal diagnosis of Sepsis.  Patient stated that the pain was moderate and constant, however without radiation.  Patient denies chest pain, shortness of air, palpitations, dizziness or syncope, headache, chills, or fever.  Denies vomiting, constipation, loss of weight or loss of appetite, dysuria, blood in urine or  stool.    ED course: CT abdomen and pelvis without contrast showed no obstructive change or perforation.  Patient received sepsis fluid bolus in emergency room and given empiric antibiotics after blood cultures were obtained.  Initially she was admitted to the hospitalist group, however remained hypotensive after fluid bolus therefore was transferred to ICU for Levophed gtt. and further work-up.    ACP: Patient does not have advanced directive on file at this facility.  She is alert and orient x4 declares herself full code.    Patient was seen and examined on 10/05/23 at 19:46 EDT .    Subjective / Review of systems     Review of Systems   Constitutional:  Negative for chills and fever.   HENT:  Negative for congestion and sore throat.    Respiratory:  Negative for cough and shortness of breath.    Cardiovascular:  Negative for chest pain and palpitations.   Gastrointestinal:  Positive for abdominal pain. Negative for nausea.   Endocrine: Negative for heat intolerance and polyuria.   Genitourinary:  Negative for dysuria and urgency.   Musculoskeletal:  Negative for arthralgias and myalgias.   Skin:  Negative for rash and wound.   Neurological:  Negative for weakness and numbness.   Psychiatric/Behavioral:  Negative for suicidal ideas. The patient is not nervous/anxious.       Past Medical/Surgical/Social/Family History & Allergies     Past Medical History:   Diagnosis Date    Hypertension     Obesity       Past Surgical History:   Procedure Laterality Date    CHOLECYSTECTOMY      DENTAL PROCEDURE        Social History     Socioeconomic History    Marital status:    Tobacco Use    Smoking status: Never    Smokeless tobacco: Never   Vaping Use    Vaping Use: Never used   Substance and Sexual Activity    Alcohol use: Not Currently    Drug use: Never    Sexual activity: Defer      Family History   Problem Relation Age of Onset    Hypertension Mother     Hypertension Father     Heart disease Father     Stroke  Father     Cancer Father         Prostate    Breast cancer Sister 47    Cancer Sister         Breast      No Known Allergies   Social Determinants of Health     Tobacco Use: Low Risk     Smoking Tobacco Use: Never    Smokeless Tobacco Use: Never    Passive Exposure: Not on file   Alcohol Use: Not on file   Financial Resource Strain: Not on file   Food Insecurity: Not on file   Transportation Needs: Not on file   Physical Activity: Not on file   Stress: Not on file   Social Connections: Not on file   Intimate Partner Violence: Not on file   Depression: Not on file   Housing Stability: Not on file        Home Medications     Prior to Admission medications    Medication Sig Start Date End Date Taking? Authorizing Provider   bisoprolol-hydrochlorothiazide (ZIAC) 5-6.25 MG per tablet Take 1 tablet by mouth Daily. 1/23/23  Yes Leonila Horan APRN   Cholecalciferol (VITAMIN D3 PO) Take 3,000 Units by mouth Daily.   Yes ProviderGloria MD   amoxicillin (AMOXIL) 875 MG tablet 1 tablet Orally Twice a day 7 days 9/14/23 10/5/23     azelastine (ASTELIN) 0.1 % nasal spray instill 2 sprays into the nostril(s) as directed by provider 2 (Two) Times a Day  Patient taking differently: 2 sprays into the nostril(s) as directed by provider As Needed. 2/22/22 10/5/23  Ericka Peñaloza MD   bisoprolol-hydrochlorothiazide (ZIAC) 5-6.25 MG per tablet Take 1 tablet by mouth Daily. 8/11/23 10/5/23     multivitamin with minerals tablet tablet Take 1 tablet by mouth Daily.  10/5/23  ProviderGloria MD        Objective / Physical Exam     Vital signs:  Temp: (!) 103.7 øF (39.8 øC) (Notified nurse and provider.)  BP: 95/51  Heart Rate: 119  Resp: 26  SpO2: 95 %  Weight: 110 kg (243 lb)    Admission Weight: Weight: 110 kg (243 lb)    Physical Exam  Constitutional:       Appearance: Normal appearance. She is obese.   HENT:      Head: Normocephalic.      Nose: Nose normal.      Mouth/Throat:      Mouth: Mucous membranes are moist.    Eyes:      Extraocular Movements: Extraocular movements intact.      Pupils: Pupils are equal, round, and reactive to light.   Cardiovascular:      Rate and Rhythm: Normal rate and regular rhythm.      Pulses: Normal pulses.      Heart sounds: Normal heart sounds.   Pulmonary:      Effort: Pulmonary effort is normal.      Breath sounds: Normal breath sounds.   Abdominal:      General: Abdomen is flat. Bowel sounds are normal.      Palpations: Abdomen is soft.   Musculoskeletal:         General: Normal range of motion.   Skin:     General: Skin is warm and dry.      Capillary Refill: Capillary refill takes 2 to 3 seconds.      Coloration: Skin is jaundiced.   Neurological:      General: No focal deficit present.      Mental Status: She is alert.   Psychiatric:         Mood and Affect: Mood normal.         Behavior: Behavior normal.        Labs     Results from last 7 days   Lab Units 10/05/23  1106   WBC 10*3/mm3 11.50*   HEMATOCRIT % 41.3   PLATELETS 10*3/mm3 146      Results from last 7 days   Lab Units 10/05/23  1106   SODIUM mmol/L 139   POTASSIUM mmol/L 3.6   CHLORIDE mmol/L 104   CO2 mmol/L 17.0*   BUN mg/dL 22*   CREATININE mg/dL 1.43*        Imaging     Chest X ray: My independent assessment showed no infiltrates or effusions    EKG: My independent evaluation showed sinus tachycardia, no ST -T changes    Current Medications     Scheduled Meds:  [START ON 10/6/2023] cefepime, 2,000 mg, Intravenous, Q12H  heparin (porcine), 5,000 Units, Subcutaneous, Q12H  metroNIDAZOLE, 500 mg, Intravenous, Q8H  midodrine, 10 mg, Oral, TID AC  [START ON 10/6/2023] pantoprazole, 40 mg, Intravenous, Q AM  senna-docusate sodium, 2 tablet, Oral, BID  sodium chloride, 10 mL, Intravenous, Q12H  ursodiol, 500 mg, Oral, Q12H         Continuous Infusions:  lactated ringers, 150 mL/hr, Last Rate: 150 mL/hr (10/05/23 1542)  norepinephrine, 0.02-0.3 mcg/kg/min, Last Rate: 0.09 mcg/kg/min (10/05/23 1846)  Pharmacy to Dose Cefepime,    Pharmacy to dose vancomycin,        Patient continues to be critically ill, remains at risk of clinical deterioration or death and needed high complexity decision making. I have spent a total of 40 minutes providing critical care services to this patient including but not limited to: review of labs/ microbiology/imaging/medications, serial monitoring of vital signs,  review of other consultant's notes, review of events in the last 24 hrs, monitoring input/output, review of treatment plan with bedside nurse, RT and other treatment team, management of life support and nutrition needs.     No family at bedside.    Time spent in performing separately billable procedures and updating family is not included in the critical care time.       Electronically signed by SANYA Rendon, 10/05/23, 7:34 PM EDT.          Electronically signed by Rishi Shafer MD at 10/06/23 1058          Physician Progress Notes (last 24 hours)        Constance Banuelos APRN at 10/08/23 1418       Attestation signed by Teja Gillis MD at 10/09/23 1133    I have reviewed this documentation and agree.                  Infectious Diseases Progress Note      LOS: 3 days   Patient Care Team:  Alessandra Barbour MD as PCP - General (Family Medicine)    Chief Complaint: No complaints today    Subjective     The patient had no fever during the last 24 hours.  She remained hemodynamically stable.  Patient has a cough due to sinus issues    Review of Systems:   Review of Systems   Constitutional: Negative.    HENT: Negative.     Eyes: Negative.    Respiratory:  Positive for cough.    Cardiovascular: Negative.    Gastrointestinal: Negative.    Genitourinary: Negative.    Musculoskeletal: Negative.    Skin: Negative.    Neurological: Negative.    Hematological: Negative.    Psychiatric/Behavioral: Negative.          Objective     Vital Signs  Temp:  [97.6 øF (36.4 øC)-98.6 øF (37 øC)] 97.8 øF (36.6 øC)  Heart Rate:  [72-84] 75  Resp:   [13-23] 13  BP: (122-170)/() 152/102    Physical Exam:  Physical Exam  Vitals and nursing note reviewed.   Constitutional:       Appearance: She is well-developed.   HENT:      Head: Normocephalic and atraumatic.   Eyes:      Pupils: Pupils are equal, round, and reactive to light.   Cardiovascular:      Rate and Rhythm: Normal rate and regular rhythm.      Heart sounds: Normal heart sounds.   Pulmonary:      Effort: Pulmonary effort is normal. No respiratory distress.      Breath sounds: Normal breath sounds. No wheezing or rales.   Abdominal:      General: Bowel sounds are normal. There is no distension.      Palpations: Abdomen is soft. There is no mass.      Tenderness: There is no abdominal tenderness. There is no guarding or rebound.   Musculoskeletal:         General: No deformity. Normal range of motion.      Cervical back: Normal range of motion and neck supple.   Skin:     General: Skin is warm.      Findings: No erythema or rash.   Neurological:      Mental Status: She is alert and oriented to person, place, and time.      Cranial Nerves: No cranial nerve deficit.          Results Review:    I have reviewed all clinical data, test, lab, and imaging results.     Radiology  No Radiology Exams Resulted Within Past 24 Hours    Cardiology    Laboratory    Results from last 7 days   Lab Units 10/08/23  0442 10/07/23  0518 10/06/23  0639 10/05/23  1106   WBC 10*3/mm3 18.60* 18.40* 26.50* 11.50*   HEMOGLOBIN g/dL 11.5* 11.2* 11.9* 13.9   HEMATOCRIT % 35.4 34.2 36.2 41.3   PLATELETS 10*3/mm3 127* 104* 106* 146     Results from last 7 days   Lab Units 10/08/23  0442 10/07/23  0518 10/06/23  0639 10/05/23  2154 10/05/23  1106   SODIUM mmol/L 143 139 141 139 139   POTASSIUM mmol/L 4.4 4.6 4.4 3.3* 3.6   CHLORIDE mmol/L 111* 108* 106 105 104   CO2 mmol/L 25.0 22.0 23.0 22.0 17.0*   BUN mg/dL 25* 21* 27* 28* 22*   CREATININE mg/dL 0.83 0.64 1.30* 1.69* 1.43*   GLUCOSE mg/dL 99 102* 78 99 89   ALBUMIN g/dL 2.8*  2.6* 3.3* 2.9* 3.1*   BILIRUBIN mg/dL 1.1 1.2 3.7* 4.5* 6.9*   ALK PHOS U/L 261* 240* 273* 277* 934*   AST (SGOT) U/L 26 46* 94* 107* 154*   ALT (SGPT) U/L 64* 83* 120* 141* 172*   LIPASE U/L  --  8*  --   --  11*   CALCIUM mg/dL 10.0 9.3 8.8 9.0 9.3                 Microbiology   Microbiology Results (last 10 days)       Procedure Component Value - Date/Time    Respiratory Panel PCR w/COVID-19(SARS-CoV-2) IVON/ANNIE/ADRIA/PAD/COR/MAD/ALDAIR In-House, NP Swab in UTM/VTM, 3-4 HR TAT - Swab, Nasopharynx [[id number]]  (Normal) Collected: 10/05/23 2051    Lab Status: Final result Specimen: Swab from Nasopharynx Updated: 10/05/23 2149     ADENOVIRUS, PCR Not Detected     Coronavirus 229E Not Detected     Coronavirus HKU1 Not Detected     Coronavirus NL63 Not Detected     Coronavirus OC43 Not Detected     COVID19 Not Detected     Human Metapneumovirus Not Detected     Human Rhinovirus/Enterovirus Not Detected     Influenza A PCR Not Detected     Influenza B PCR Not Detected     Parainfluenza Virus 1 Not Detected     Parainfluenza Virus 2 Not Detected     Parainfluenza Virus 3 Not Detected     Parainfluenza Virus 4 Not Detected     RSV, PCR Not Detected     Bordetella pertussis pcr Not Detected     Bordetella parapertussis PCR Not Detected     Chlamydophila pneumoniae PCR Not Detected     Mycoplasma pneumo by PCR Not Detected    Narrative:      In the setting of a positive respiratory panel with a viral infection PLUS a negative procalcitonin without other underlying concern for bacterial infection, consider observing off antibiotics or discontinuation of antibiotics and continue supportive care. If the respiratory panel is positive for atypical bacterial infection (Bordetella pertussis, Chlamydophila pneumoniae, or Mycoplasma pneumoniae), consider antibiotic de-escalation to target atypical bacterial infection.    MRSA Screen, PCR (Inpatient) - Swab, Nares [[id number]]  (Abnormal) Collected: 10/05/23 2051    Lab Status: Final  result Specimen: Swab from Nares Updated: 10/05/23 2218     MRSA PCR MRSA Detected    Narrative:      The negative predictive value of this diagnostic test is high and should only be used to consider de-escalating anti-MRSA therapy. A positive result may indicate colonization with MRSA and must be correlated clinically.    Blood Culture - Blood, Arm, Left [[id number]]  (Abnormal) Collected: 10/05/23 1238    Lab Status: Preliminary result Specimen: Blood from Arm, Left Updated: 10/08/23 1058     Blood Culture Enterococcus species     Comment:   Infectious disease consultation is highly recommended.        Isolated from Aerobic Bottle     Gram Stain Aerobic Bottle Gram positive cocci in pairs and chains      Anaerobic Bottle Gram negative bacilli      --     Comment: The previously reported stain Aerobic Bottle Gram positive bacilli is no longer being reported.       Blood Culture ID, PCR - Blood, Arm, Left [[id number]]  (Abnormal) Collected: 10/05/23 1238    Lab Status: Final result Specimen: Blood from Arm, Left Updated: 10/07/23 1322     BCID, PCR Enterococcus faecium. Irene/B (vancomycin resistance gene) not detected. Identification by BCID2 PCR.     BOTTLE TYPE Aerobic Bottle    Narrative:      Infectious disease consultation is highly recommended to rule out distant foci of infection.    Blood Culture - Blood, Arm, Right [[id number]]  (Abnormal)  (Susceptibility) Collected: 10/05/23 1109    Lab Status: Final result Specimen: Blood from Arm, Right Updated: 10/08/23 0614     Blood Culture Escherichia coli     Isolated from Anaerobic Bottle     Gram Stain Anaerobic Bottle Gram negative bacilli    Narrative:      Less than seven (7) mL's of blood was collected.  Insufficient quantity may yield false negative results.    Susceptibility        Escherichia coli      HUEY      Ampicillin Susceptible      Ampicillin + Sulbactam Susceptible      Cefepime Susceptible      Ceftazidime Susceptible      Ceftriaxone Susceptible       Gentamicin Susceptible      Levofloxacin Susceptible      Piperacillin + Tazobactam Susceptible      Trimethoprim + Sulfamethoxazole Susceptible                       Susceptibility Comments       Escherichia coli    Cefazolin sensitivity will not be reported for Enterobacteriaceae in non-urine isolates. If cefazolin is preferred, please call the microbiology lab to request an E-test.  With the exception of urinary-sourced infections, aminoglycosides should not be used as monotherapy.               Blood Culture ID, PCR - Blood, Arm, Right [[id number]]  (Abnormal) Collected: 10/05/23 1109    Lab Status: Final result Specimen: Blood from Arm, Right Updated: 10/06/23 0825     BCID, PCR Escherichia coli. Identification by BCID2 PCR.     BOTTLE TYPE Anaerobic Bottle    Narrative:      No resistance genes detected.            Medication Review:       Schedule Meds  bisoprolol, 5 mg, Oral, Q24H  enoxaparin, 40 mg, Subcutaneous, BID  pantoprazole, 40 mg, Intravenous, Q AM  piperacillin-tazobactam, 4.5 g, Intravenous, Q8H  sodium chloride, 10 mL, Intravenous, Q12H  ursodiol, 500 mg, Oral, Q12H        Infusion Meds         PRN Meds    Calcium Replacement - Follow Nurse / BPA Driven Protocol    hydrALAZINE    Magnesium Standard Dose Replacement - Follow Nurse / BPA Driven Protocol    Morphine    ondansetron **OR** ondansetron    oxyCODONE    Phosphorus Replacement - Follow Nurse / BPA Driven Protocol    Potassium Replacement - Follow Nurse / BPA Driven Protocol    sodium chloride    sodium chloride    sodium chloride    temazepam        Assessment & Plan       Antimicrobial Therapy   1.  IV Zosyn        2.        3.        4.        5.            Assessment    Polymicrobial bacteremia with E. coli and Enterococcus faecium.  Final susceptibilities are pending.  Most likely secondary to biliary source.    Resolved septic shock secondary to above    Elevated transaminase and hyperbilirubinemia.  Almost resolved.  Most likely  secondary to passed biliary stone.  Patient s/p ERCP on 2023    S/p cholecystectomy in       Plan    Continue Zosyn 4.5 g every 8 hours waiting on final blood culture results.  Patient will need 2 weeks of IV antimicrobial therapy  Patient already has a PICC line-please remove once IV antibiotics are completed  Repeat 1 set of blood cultures today  If repeat blood cultures positive for Enterococcus species then consider 2D echo  Continue supportive care  A.m. labs  Case discussed with patient and family member at bedside  Case discussed with RN      Constance Banuelos, APRN  10/08/23  14:18 EDT    Note is dictated utilizing voice recognition software/Dragon      Electronically signed by Teja Gillis MD at 10/09/23 1133       Navi Vasquez MD at 10/08/23 1414              [facility] Medicine Services   Daily Progress Note    Patient Name: Idalia Oliver  : 1970  MRN: [id number]  Primary Care Physician:  Alessandra Barbour MD  Date of admission: 10/5/2023  Date and Time of Service:       Subjective      Chief Complaint:     Patient feeling significantly better today  No chills or sweats  Mild nausea but no vomiting  No chest pain or SOB  No new complaints           Objective      Vitals:   Temp:  [97.6 øF (36.4 øC)-98.6 øF (37 øC)] 97.8 øF (36.6 øC)  Heart Rate:  [72-84] 75  Resp:  [13-23] 13  BP: (122-170)/() 152/102    Physical Exam  General: No acute distress  HEENT: Neck supple, normal oral mucosa, no masses, no lymphadenopathy  Lungs: Clear bilaterally, no wheezing, no crackles, no rhonchi. Equal excursions.   CV - Normal S1/S2, no murmur, regular rate and rhythm   Abdomen - Soft, nontender, nondistended, normal bowel sounds  Extremities - no edema, no erythema  Neuro - No focal weakness, normal sensation  Psych - Alert and oriented x3  Skin - no wounds or lesions.          Result Review    Result Review:  I have personally reviewed the results from the time of this  admission to 10/8/2023 14:14 EDT and agree with these findings:  [x]  Laboratory  [x]  Microbiology  [x]  Radiology  [x]  EKG/Telemetry   [x]  Cardiology/Vascular   []  Pathology  [x]  Old records  []  Other:  Most notable findings include:           Assessment & Plan      Brief Patient Summary:  Idalia Oliver is a 53 y.o. female who       bisoprolol, 5 mg, Oral, Q24H  enoxaparin, 40 mg, Subcutaneous, BID  pantoprazole, 40 mg, Intravenous, Q AM  piperacillin-tazobactam, 4.5 g, Intravenous, Q8H  sodium chloride, 10 mL, Intravenous, Q12H  ursodiol, 500 mg, Oral, Q12H               Active Hospital Problems:  Active Hospital Problems    Diagnosis     **Septic shock     RUQ abdominal pain     Elevated liver enzymes      Plan:     Septic shock  - E Coli Bacteremia  - s/p Pressors d/c  - suspected biliary source  - Continue IV Abx per ID - Ceftriaxone.   - Blood culture growing Enterococcus. Awaiting final results.    - Follow repeat Blood culture - if positive for Enterococcus then will need Echo    Elevated liver enzymes  - Elevated Bilirubin  - s/p ERCP 10/6 - no evidence of stone or debis, likely passed stones  - monitor     CONCEPCIÓN  - Bisoprolol and HCTZ on hold  - resolved. D/c fluids     HTN  - resume Bisoprolol   - BP elevated. If remains elevated will resume home HCTZ     Acute thrombocytopenia  - likely related to sepsis. Monitor. Trending up    Normocytic anemia - seems to be anemia of chronic disease. OP follow up    Morbid Obesity - Body mass index is 45.07 kg/mý.           DVT prophylaxis:  Medical and mechanical DVT prophylaxis orders are present.    CODE STATUS:    Level Of Support Discussed With: Patient  Code Status (Patient has no pulse and is not breathing): CPR (Attempt to Resuscitate)  Medical Interventions (Patient has pulse or is breathing): Full Support      Disposition:  I expect patient to be discharged .    This patient has been  and discussed with . 10/08/23      Electronically signed by Navi  MD Christina, 10/08/23, 14:14 EDT.  [facility] Hospitalist Team               Electronically signed by Navi Vasquez MD at 10/08/23 1416       Consult Notes (last 24 hours)  Notes from 10/08/23 1337 through 10/09/23 1337   No notes of this type exist for this encounter.          Physical Therapy Notes (last 24 hours)        Bulmaro Casiano PT at 10/08/23 1740  Version 1 of 1         Goal Outcome Evaluation:  Plan of Care Reviewed With: patient           Outcome Evaluation: Pt is a 52 YO F POD endoscopic retrograde cholangiopancreograhy with baloon clearance bile duct. Pt reports she lives at home with spouse, typically is independent with all ADLs, ambulation without AD and had no recent falls. Pt has 10 steps to navigate in home but generally has no difficulty managing. 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 Pt appears near functional baseline and recommendation is return juan antonio with family assist.      Anticipated Discharge Disposition (PT): home with assist    Electronically signed by Bulmaro Casiano PT at 10/08/23 1740       Bulmaro Casiano PT at 10/08/23 1742  Version 1 of 1         Patient Name: Idalia Oliver  : 1970    MRN: [id number]                              Today's Date: 10/8/2023       Admit Date: 10/5/2023    Visit Dx:     ICD-10-CM ICD-9-CM   1. Severe sepsis  A41.9 038.9    R65.20 995.92   2. Septic shock  A41.9 038.9    R65.21 785.52     995.92   3. RUQ abdominal pain  R10.11 789.01   4. Elevated liver enzymes  R74.8 790.5     Patient Active Problem List   Diagnosis    Septic shock    Hypertension    Body mass index (BMI) 40.0-44.9, adult    RUQ abdominal pain    Elevated liver enzymes     Past Medical History:   Diagnosis Date    Hypertension     Obesity      Past Surgical History:   Procedure Laterality Date    CHOLECYSTECTOMY      DENTAL PROCEDURE        General Information       Row Name 10/08/23  1721          Physical Therapy Time and Intention    Document Type evaluation  -     Mode of Treatment individual therapy;physical therapy  -       Row Name 10/08/23 1721          General Information    Prior Level of Function independent:;all household mobility;ADL's;work;driving  -       Row Name 10/08/23 1721          Living Environment    People in Home spouse  -       Row Name 10/08/23 1721          Home Main Entrance    Number of Stairs, Main Entrance three  -       Row Name 10/08/23 1721          Stairs Within Home, Primary    Number of Stairs, Within Home, Primary none  -       Row Name 10/08/23 1721          Cognition    Orientation Status (Cognition) oriented x 4  -               User Key  (r) = Recorded By, (t) = Taken By, (c) = Cosigned By      Initials Name Provider Type    Bulmaro Ford PT Physical Therapist                   Mobility       Row Name 10/08/23 1727          Bed Mobility    Bed Mobility bed mobility (all) activities  -     All Activities, [place] (Bed Mobility) independent  -       Row Name 10/08/23 1727          Bed-Chair Transfer    Bed-Chair [place] (Transfers) independent  -       Row Name 10/08/23 1727          Sit-Stand Transfer    Sit-Stand [place] (Transfers) independent  -       Row Name 10/08/23 1727          Gait/Stairs (Locomotion)    [place] Level (Gait) independent  -EL     Distance in Feet (Gait) 100  -               User Key  (r) = Recorded By, (t) = Taken By, (c) = Cosigned By      Initials Name Provider Type    Bulmaro Ford PT Physical Therapist                   Obj/Interventions       Row Name 10/08/23 1730          Range of Motion Comprehensive    General Range of Motion bilateral lower extremity ROM WFL  -       Row Name 10/08/23 1730          Strength Comprehensive (MMT)    General Manual Muscle Testing (MMT) Assessment no strength deficits identified  -       Row Name 10/08/23 1730          Balance    Balance  Assessment sitting static balance;standing static balance;standing dynamic balance  -EL     Static Sitting Balance independent  -EL     Static Standing Balance supervision  -EL     Dynamic Standing Balance supervision  -EL       Row Name 10/08/23 1730          Sensory Assessment (Somatosensory)    Sensory Assessment (Somatosensory) sensation intact  -EL               User Key  (r) = Recorded By, (t) = Taken By, (c) = Cosigned By      Initials Name Provider Type    Bulmaro Ford, PT Physical Therapist                   Goals/Plan    No documentation.                  Clinical Impression       Row Name 10/08/23 1732          Pain    Pretreatment Pain Rating 0/10 - no pain  -EL     Posttreatment Pain Rating 0/10 - no pain  -EL       Row Name 10/08/23 1732          Plan of Care Review    Plan of Care Reviewed With patient  -EL     Outcome Evaluation Pt is a 54 YO F POD endoscopic retrograde cholangiopancreograhy with baloon clearance bile duct. Pt reports she lives at home with spouse, typically is independent with all ADLs, ambulation without AD and had no recent falls. Pt has 10 steps to navigate in home but generally has no difficulty managing. 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 Pt appears near functional baseline and recommendation is return juan antonio with family assist.  -       Row Name 10/08/23 1732          Therapy Assessment/Plan (PT)    Criteria for Skilled Interventions Met (PT) no problems identified which require skilled intervention  -EL     Therapy Frequency (PT) evaluation only  -       Row Name 10/08/23 1732          Vital Signs    O2 Delivery Pre Treatment room air  -EL     O2 Delivery Intra Treatment room air  -EL     O2 Delivery Post Treatment room air  -EL     Pre Patient Position Supine  -EL     Intra Patient Position Standing  -EL     Post Patient Position Sitting  -EL       Row Name 10/08/23  1732          Positioning and Restraints    Pre-Treatment Position in bed  -EL     Post Treatment Position bed  -EL     In Bed notified nsg;supine;call light within reach;encouraged to call for assist;exit alarm on  -EL               User Key  (r) = Recorded By, (t) = Taken By, (c) = Cosigned By      Initials Name Provider Type    EL Bulmaro Casiano PT Physical Therapist                   Outcome Measures       Row Name 10/08/23 1739          How much help from another person do you currently need...    Turning from your back to your side while in flat bed without using bedrails? 4  -EL     Moving from lying on back to sitting on the side of a flat bed without bedrails? 4  -EL     Moving to and from a bed to a chair (including a wheelchair)? 4  -EL     Standing up from a chair using your arms (e.g., wheelchair, bedside chair)? 4  -EL     Climbing 3-5 steps with a railing? 4  -EL     To walk in hospital room? 4  -EL     AM-PAC 6 Clicks Score (PT) 24  -EL     Highest level of mobility 8 --> Walked 250 feet or more  -       Row Name 10/08/23 1739          Functional Assessment    Outcome Measure Options AM-PAC 6 Clicks Basic Mobility (PT)  -EL               User Key  (r) = Recorded By, (t) = Taken By, (c) = Cosigned By      Initials Name Provider Type    Bulmaro Ford PT Physical Therapist                                 Physical Therapy Education       Title: PT OT SLP Therapies (Done)       Topic: Physical Therapy (Done)       Point: Mobility training (Done)       Learning Progress Summary             Patient Acceptance, E,TB, VU by  at 10/8/2023 1740                                         User Key       Initials Effective Dates Name Provider Type Discipline     06/23/20 -  Bulmaro Casiano, PT Physical Therapist PT                  PT Recommendation and Plan     Plan of Care Reviewed With: patient  Outcome Evaluation: Pt is a 54 YO F POD endoscopic retrograde cholangiopancreograhy with baloon clearance bile duct. Pt  reports she lives at home with spouse, typically is independent with all ADLs, ambulation without AD and had no recent falls. Pt has 10 steps to navigate in home but generally has no difficulty managing. 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 Pt appears near functional baseline and recommendation is return juan antonio with family assist.     Time Calculation:   PT Evaluation Complexity  History, PT Evaluation Complexity: no personal factors and/or comorbidities  Examination of Body Systems (PT Eval Complexity): 1-2 elements  Clinical Presentation (PT Evaluation Complexity): stable  Clinical Decision Making (PT Evaluation Complexity): low complexity  Overall Complexity (PT Evaluation Complexity): low complexity     PT Charges       Row Name 10/08/23 1741             Time Calculation    Start Time 0900  -EL      Stop Time 0914  -EL      Time Calculation (min) 14 min  -EL      PT Received On 10/08/23  -EL                User Key  (r) = Recorded By, (t) = Taken By, (c) = Cosigned By      Initials Name Provider Type     Bulmaro Casiano PT Physical Therapist                  Therapy Charges for Today       Code Description Service Date Service Provider Modifiers Qty    [id number]  PT EVAL LOW COMPLEXITY 3 10/8/2023 Bulmaro Casiano, PT GP 1            PT G-Codes  Outcome Measure Options: AM-PAC 6 Clicks Basic Mobility (PT)  AM-PAC 6 Clicks Score (PT): 24  PT Discharge Summary  Anticipated Discharge Disposition (PT): home with assist    Bulmaro Casiano PT  10/8/2023      Electronically signed by Bulmaro Casiano PT at 10/08/23 1742       Occupational Therapy Notes (last 24 hours)  Notes from 10/08/23 1338 through 10/09/23 1338   No notes exist for this encounter.

## 2023-10-09 NOTE — DISCHARGE SUMMARY
[facility] Medicine Services  Discharge Summary    Date of Service: 10/09/23    Patient Name: Idalia Oliver  : 1970  MRN: [id number]    Date of Admission: 10/5/2023  Discharge Diagnosis: Septic shock, E. coli bacteremia, Enterococcus bacteremia, elevated liver enzymes, elevated bilirubin, [place], acute thrombocytopenia.  Date of Discharge:  10/09/23  Patient condition: Stable    Primary Care Physician: Alessandra Barbour MD      Presenting Problem:   Sepsis [A41.9]  Severe sepsis [A41.9, R65.20]    Active and Resolved Hospital Problems:  Active Hospital Problems    Diagnosis POA    **Septic shock [A41.9, R65.21] Yes    RUQ abdominal pain [R10.11] Unknown    Elevated liver enzymes [R74.8] Unknown      Resolved Hospital Problems   No resolved problems to display.         Hospital Course     Hospital Course:  Idalia Oliver is a 53 y.o. female Patient is a 53-year-old female who presented emergency room with chief complaint abdominal pain was found to have septic shock secondary to E. coli bacteremia as well as Enterococcus bacteremia.  Suspected due to biliary source.  Patient was seen by infectious disease started on IV antibiotics and transition to Unasyn to complete course per infectious disease recommendations as an outpatient.  Patient also was found to have elevated liver enzymes and elevated bilirubin had an ERCP on 10/6/2023 with no evidence of stone or debris likely passed stone.  Patient also had acute kidney injury that resolved with fluids.  Also had acute thrombocytopenia likely with sepsis that has been trending up.  She is feeling good with no complaints and stable for discharge with outpatient follow-up with her regular physician as well as GI.        DISCHARGE Follow Up Recommendations for labs and diagnostics:       Reasons For Change In Medications and Indications for New Medications:      Day of Discharge     Vital Signs:  Temp:  [97.6 øF (36.4 øC)-98.5 øF (36.9 øC)] 98.4 øF  (36.9 øC)  Heart Rate:  [53-78] 76  Resp:  [11-15] 12  BP: (114-145)/(65-95) 143/89    Physical Exam:  Physical Exam   General: No acute distress  HEENT: Neck supple, normal oral mucosa, no masses, no lymphadenopathy  Lungs: Clear bilaterally, no wheezing, no crackles, no rhonchi. Equal excursions.   CV - Normal S1/S2, no murmur, regular rate and rhythm   Abdomen - Soft, nontender, nondistended, normal bowel sounds  Extremities - no edema, no erythema  Neuro - No focal weakness, normal sensation  Psych - Alert and oriented x3  Skin - no wounds or lesions.         Pertinent  and/or Most Recent Results     LAB RESULTS:      Lab 10/09/23  0517 10/08/23  0442 10/07/23  0518 10/06/23  0639 10/06/23  0211 10/05/23  2154 10/05/23  1331 10/05/23  1107 10/05/23  1106   WBC 10.20 18.60* 18.40* 26.50*  --   --   --   --  11.50*   HEMOGLOBIN 11.2* 11.5* 11.2* 11.9*  --   --   --   --  13.9   HEMATOCRIT 34.0 35.4 34.2 36.2  --   --   --   --  41.3   PLATELETS 110* 127* 104* 106*  --   --   --   --  146   NEUTROS ABS 6.83 16.74* 17.20* 23.90*  --   --   --   --  11.10*   LYMPHS ABS  --   --  0.60* 1.50  --   --   --   --  0.20*   MONOS ABS  --   --  0.30 0.90  --   --   --   --  0.10   EOS ABS 0.10  --  0.30 0.20  --   --   --   --  0.00   MCV 86.7 87.2 86.2 86.2  --   --   --   --  86.8   PROCALCITONIN  --   --   --  96.19*  --   --   --   --  24.09*   LACTATE  --   --   --  2.0 2.0 2.7* 5.2* 5.1*  --    PROTIME  --   --   --   --   --   --   --   --  14.2*   APTT  --   --   --   --   --   --   --   --  28.0         Lab 10/09/23  0517 10/08/23  0442 10/07/23  0518 10/06/23  0639 10/05/23  6913   SODIUM 140 143 139 141 139   POTASSIUM 3.7 4.4 4.6 4.4 3.3*   CHLORIDE 107 111* 108* 106 105   CO2 24.0 25.0 22.0 23.0 22.0   ANION GAP 9.0 7.0 9.0 12.0 12.0   BUN 27* 25* 21* 27* 28*   CREATININE 0.86 0.83 0.64 1.30* 1.69*   EGFR 79.9 83.4 104.5 48.7* 35.5*   GLUCOSE 79 99 102* 78 99   CALCIUM 9.4 10.0 9.3 8.8 9.0   MAGNESIUM  --  2.0   --  3.4* 1.4*   PHOSPHORUS  --  2.3*  --  3.1 3.1         Lab 10/09/23  0517 10/08/23  0442 10/07/23  0518 10/06/23  0639 10/05/23  2154 10/05/23  1106   TOTAL PROTEIN 5.3* 6.0 5.8* 5.9* 5.6* 6.0   ALBUMIN 2.8* 2.8* 2.6* 3.3* 2.9* 3.1*   GLOBULIN 2.5 3.2 3.2 2.6 2.7 2.9   ALT (SGPT) 42* 64* 83* 120* 141* 172*   AST (SGOT) 20 26 46* 94* 107* 154*   BILIRUBIN 1.0 1.1 1.2 3.7* 4.5* 6.9*   ALK PHOS 222* 261* 240* 273* 277* 934*   LIPASE  --   --  8*  --   --  11*         Lab 10/05/23  1331 10/05/23  1106   HSTROP T 17* 13*   PROTIME  --  14.2*   INR  --  1.33*             Lab 10/08/23  0442 10/05/23  1106   IRON 162*  --    IRON SATURATION (TSAT) 61*  --    TIBC 265*  --    TRANSFERRIN 178*  --    FERRITIN 197.00*  --    FOLATE 11.40  --    VITAMIN B 12 1,941*  --    ABO TYPING  --  O   RH TYPING  --  Negative   ANTIBODY SCREEN  --  Negative         Brief Urine Lab Results  (Last result in the past 365 days)        Color   Clarity   Blood   Leuk Est   Nitrite   Protein   CREAT   Urine HCG        10/05/23 1125 Yellow   Cloudy  Comment: Result checked     Small (1+)   Negative   Negative   100 mg/dL (2+)                 Microbiology Results (last 10 days)       Procedure Component Value - Date/Time    Blood Culture - Blood, Arm, Right [[id number]]  (Normal) Collected: 10/07/23 1654    Lab Status: Preliminary result Specimen: Blood from Arm, Right Updated: 10/08/23 1700     Blood Culture No growth at 24 hours    Narrative:      Less than seven (7) mL's of blood was collected.  Insufficient quantity may yield false negative results.    Respiratory Panel PCR w/COVID-19(SARS-CoV-2) IVON/ANNIE/ADRIA/PAD/COR/MAD/ALDAIR In-House, NP Swab in UTM/VTM, 3-4 HR TAT - Swab, Nasopharynx [[id number]]  (Normal) Collected: 10/05/23 2051    Lab Status: Final result Specimen: Swab from Nasopharynx Updated: 10/05/23 2149     ADENOVIRUS, PCR Not Detected     Coronavirus 229E Not Detected     Coronavirus HKU1 Not Detected     Coronavirus NL63 Not  Detected     Coronavirus OC43 Not Detected     COVID19 Not Detected     Human Metapneumovirus Not Detected     Human Rhinovirus/Enterovirus Not Detected     Influenza A PCR Not Detected     Influenza B PCR Not Detected     Parainfluenza Virus 1 Not Detected     Parainfluenza Virus 2 Not Detected     Parainfluenza Virus 3 Not Detected     Parainfluenza Virus 4 Not Detected     RSV, PCR Not Detected     Bordetella pertussis pcr Not Detected     Bordetella parapertussis PCR Not Detected     Chlamydophila pneumoniae PCR Not Detected     Mycoplasma pneumo by PCR Not Detected    Narrative:      In the setting of a positive respiratory panel with a viral infection PLUS a negative procalcitonin without other underlying concern for bacterial infection, consider observing off antibiotics or discontinuation of antibiotics and continue supportive care. If the respiratory panel is positive for atypical bacterial infection (Bordetella pertussis, Chlamydophila pneumoniae, or Mycoplasma pneumoniae), consider antibiotic de-escalation to target atypical bacterial infection.    MRSA Screen, PCR (Inpatient) - Swab, Nares [[id number]]  (Abnormal) Collected: 10/05/23 2051    Lab Status: Final result Specimen: Swab from Nares Updated: 10/05/23 2218     MRSA PCR MRSA Detected    Narrative:      The negative predictive value of this diagnostic test is high and should only be used to consider de-escalating anti-MRSA therapy. A positive result may indicate colonization with MRSA and must be correlated clinically.    Blood Culture - Blood, Arm, Left [[id number]]  (Abnormal) Collected: 10/05/23 1238    Lab Status: Preliminary result Specimen: Blood from Arm, Left Updated: 10/09/23 0650     Blood Culture Enterococcus species     Comment: Infectious disease consultation is highly recommended.  ID/MICs to follow        Isolated from Aerobic Bottle     Blood Culture Escherichia coli     Comment: Refer to previous blood culture collected on 10/05/2023  1109 for MICs.           Isolated from Anaerobic Bottle     Gram Stain Aerobic Bottle Gram positive cocci in pairs and chains      Anaerobic Bottle Gram negative bacilli      --     Comment: The previously reported stain Aerobic Bottle Gram positive bacilli is no longer being reported.       Blood Culture ID, PCR - Blood, Arm, Left [[id number]]  (Abnormal) Collected: 10/05/23 1238    Lab Status: Final result Specimen: Blood from Arm, Left Updated: 10/07/23 1322     BCID, PCR Enterococcus faecium. Irene/B (vancomycin resistance gene) not detected. Identification by BCID2 PCR.     BOTTLE TYPE Aerobic Bottle    Narrative:      Infectious disease consultation is highly recommended to rule out distant foci of infection.    Blood Culture - Blood, Arm, Right [[id number]]  (Abnormal)  (Susceptibility) Collected: 10/05/23 1109    Lab Status: Final result Specimen: Blood from Arm, Right Updated: 10/08/23 0614     Blood Culture Escherichia coli     Isolated from Anaerobic Bottle     Gram Stain Anaerobic Bottle Gram negative bacilli    Narrative:      Less than seven (7) mL's of blood was collected.  Insufficient quantity may yield false negative results.    Susceptibility        Escherichia coli      HUEY      Ampicillin Susceptible      Ampicillin + Sulbactam Susceptible      Cefepime Susceptible      Ceftazidime Susceptible      Ceftriaxone Susceptible      Gentamicin Susceptible      Levofloxacin Susceptible      Piperacillin + Tazobactam Susceptible      Trimethoprim + Sulfamethoxazole Susceptible                       Susceptibility Comments       Escherichia coli    Cefazolin sensitivity will not be reported for Enterobacteriaceae in non-urine isolates. If cefazolin is preferred, please call the microbiology lab to request an E-test.  With the exception of urinary-sourced infections, aminoglycosides should not be used as monotherapy.               Blood Culture ID, PCR - Blood, Arm, Right [[id number]]  (Abnormal) Collected:  10/05/23 1109    Lab Status: Final result Specimen: Blood from Arm, Right Updated: 10/06/23 0825     BCID, PCR Escherichia coli. Identification by BCID2 PCR.     BOTTLE TYPE Anaerobic Bottle    Narrative:      No resistance genes detected.            FL ERCP pancreatic and biliary ducts    Result Date: 10/6/2023  Impression: Impression: Limited intraoperative fluoroscopic views were obtained for surgical support during ERCP. Recommend correlation with real-time findings at the time of the procedure. Electronically Signed: James Barrios MD  10/6/2023 4:00 PM EDT  Workstation ID: [id number]    US Liver    Result Date: 10/6/2023  Impression: Impression: Unremarkable ultrasound appearance of the liver Electronically Signed: Mauro Flores  10/6/2023 7:44 AM EDT  Workstation ID: [id number]    CT Chest Without Contrast Diagnostic    Result Date: 10/5/2023  Impression: Impression: No acute process identified. Electronically Signed: Joshua Butts MD  10/5/2023 1:20 PM CDT  Workstation ID: [id number]    XR Chest 1 View    Result Date: 10/5/2023  Impression: Impression: 1. No acute cardiopulmonary disease. Electronically Signed: Micheal Lua MD  10/5/2023 12:45 PM EDT  Workstation ID: [id number]    CT Abdomen Pelvis Without Contrast    Result Date: 10/5/2023  Impression: Impression: 1. No acute abnormality in the abdomen or pelvis. 2. Colonic diverticulosis without diverticulitis. 3. Prior cholecystectomy. Electronically Signed: Nasir Awad MD  10/5/2023 12:14 PM EDT  Workstation ID: [id number]                 Labs Pending at Discharge:  Pending Labs       Order Current Status    Blood Culture - Blood, Arm, Left Preliminary result    Blood Culture - Blood, Arm, Right Preliminary result            Procedures Performed  Procedure(s):  ENDOSCOPIC RETROGRADE CHOLANGIOPANCREATOGRAPHYWITH BALLOON CLEAREANCE (12MM - 15MM BALLOON) UP TO 15MM OF BILE DUCT  10/06 1517 ERCP      Consults:   Consults       Date and Time Order Name Status  Description    10/6/2023  9:52 AM Inpatient Hospitalist Consult      10/5/2023  1:55 PM Inpatient Gastroenterology Consult Completed     10/5/2023  1:55 PM Inpatient Infectious Diseases Consult Completed     10/5/2023  1:30 PM Hospitalist (on-call MD unless specified)                Discharge Details        Discharge Medications        New Medications        Instructions Start Date   ampicillin 2 g in sodium chloride 0.9 % 100 mL IVPB   2 g, Intravenous, Every 4 Hours      ursodiol 500 MG tablet  Commonly known as: ACTIGALL   500 mg, Oral, Every 12 Hours Scheduled             Continue These Medications        Instructions Start Date   bisoprolol-hydrochlorothiazide 5-6.25 MG per tablet  Commonly known as: ZIAC   1 tablet, Oral, Daily      VITAMIN D3 PO   3,000 Units, Oral, Daily               No Known Allergies      Discharge Disposition: home with OP follow up  Home or Self Care    Diet:  Hospital:  Diet Order   Procedures    Diet: Cardiac Diets; Healthy Heart (2-3 Na+); Texture: Regular Texture (IDDSI 7); Fluid Consistency: Thin (IDDSI 0)         Discharge Activity:         CODE STATUS:  Code Status and Medical Interventions:   Ordered at: 10/05/23 1355     Level Of Support Discussed With:    Patient     Code Status (Patient has no pulse and is not breathing):    CPR (Attempt to Resuscitate)     Medical Interventions (Patient has pulse or is breathing):    Full Support         No future appointments.        Time spent on Discharge including face to face service:  31 minutes    This patient has been  and discussed with . 10/09/23      Signature: Electronically signed by Navi Vasquez MD, 10/09/23, 14:05 EDT.  [facility] Hospitalist Team

## 2023-10-09 NOTE — PLAN OF CARE
Goal Outcome Evaluation:           Progress: improving  Outcome Evaluation: Patient resting comfortably in bed, no complaints of pain or dstress, will continue to monitor,

## 2023-10-09 NOTE — CASE MANAGEMENT/SOCIAL WORK
Continued Stay Note   [place]     Patient Name: Idalia Oliver  MRN: [id number]  Today's Date: 10/9/2023    Admit Date: 10/5/2023    Plan: DC Plan: home with spouse. IV abx on dc thru [facility] (can deliver abx 10/10). Ambulatory care for labs/ line. PICC in place.   Discharge Plan       Row Name 10/09/23 4334       Plan    Plan DC Plan: home with spouse. IV abx on dc thru [facility] (can deliver abx 10/10). Ambulatory care for labs/ line. PICC in place.    Patient/Family in Agreement with Plan yes    Plan Comments Patient will need IV abx on dc.   Spoke wiht patient, chose [facility] (accepted, cannot deliver abx until 10/10).  Sent in basket to [facility]- accepted, cannot deliver until 10/10.   Chose [facility] 2/2 insurance.   Notified patient, nurse, MD of plan to DC 10/10.             Expected Discharge Date and Time       Expected Discharge Date Expected Discharge Time    Oct 9, 2023         Phone communication or documentation only - no physical contact with patient or family.   Regi Churchill RN     Office Phone [phone]  Office Cell [phone]

## 2023-10-09 NOTE — DISCHARGE INSTR - LAB
Ambulatory care will call you to set up appointments for PICC line removal and possible lab work.

## 2023-10-09 NOTE — PROGRESS NOTES
Infectious Diseases Progress Note      LOS: 4 days   Patient Care Team:  Alessandra Barbour MD as PCP - General (Family Medicine)    Chief Complaint: No complaints today    Subjective     The patient had no fever during the last 24 hours.  She remained hemodynamically stable.  She is tolerating antimicrobial therapy and is currently on room air    Review of Systems:   Review of Systems   Constitutional: Negative.    HENT: Negative.     Eyes: Negative.    Respiratory:  Positive for cough.    Cardiovascular: Negative.    Gastrointestinal: Negative.    Genitourinary: Negative.    Musculoskeletal: Negative.    Skin: Negative.    Neurological: Negative.    Hematological: Negative.    Psychiatric/Behavioral: Negative.          Objective     Vital Signs  Temp:  [97.6 øF (36.4 øC)-98.5 øF (36.9 øC)] 98.4 øF (36.9 øC)  Heart Rate:  [53-78] 76  Resp:  [11-15] 12  BP: (114-145)/(65-95) 143/89    Physical Exam:  Physical Exam  Vitals and nursing note reviewed.   Constitutional:       Appearance: She is well-developed.   HENT:      Head: Normocephalic and atraumatic.   Eyes:      Pupils: Pupils are equal, round, and reactive to light.   Cardiovascular:      Rate and Rhythm: Normal rate and regular rhythm.      Heart sounds: Normal heart sounds.   Pulmonary:      Effort: Pulmonary effort is normal. No respiratory distress.      Breath sounds: Normal breath sounds. No wheezing or rales.   Abdominal:      General: Bowel sounds are normal. There is no distension.      Palpations: Abdomen is soft. There is no mass.      Tenderness: There is no abdominal tenderness. There is no guarding or rebound.   Musculoskeletal:         General: No deformity. Normal range of motion.      Cervical back: Normal range of motion and neck supple.   Skin:     General: Skin is warm.      Findings: No erythema or rash.   Neurological:      Mental Status: She is alert and oriented to person, place, and time.      Cranial Nerves: No cranial nerve deficit.           Results Review:    I have reviewed all clinical data, test, lab, and imaging results.     Radiology  No Radiology Exams Resulted Within Past 24 Hours    Cardiology    Laboratory    Results from last 7 days   Lab Units 10/09/23  0517 10/08/23  0442 10/07/23  0518 10/06/23  0639 10/05/23  1106   WBC 10*3/mm3 10.20 18.60* 18.40* 26.50* 11.50*   HEMOGLOBIN g/dL 11.2* 11.5* 11.2* 11.9* 13.9   HEMATOCRIT % 34.0 35.4 34.2 36.2 41.3   PLATELETS 10*3/mm3 110* 127* 104* 106* 146     Results from last 7 days   Lab Units 10/09/23  0517 10/08/23  0442 10/07/23  0518 10/06/23  0639 10/05/23  2154 10/05/23  1106   SODIUM mmol/L 140 143 139 141 139 139   POTASSIUM mmol/L 3.7 4.4 4.6 4.4 3.3* 3.6   CHLORIDE mmol/L 107 111* 108* 106 105 104   CO2 mmol/L 24.0 25.0 22.0 23.0 22.0 17.0*   BUN mg/dL 27* 25* 21* 27* 28* 22*   CREATININE mg/dL 0.86 0.83 0.64 1.30* 1.69* 1.43*   GLUCOSE mg/dL 79 99 102* 78 99 89   ALBUMIN g/dL 2.8* 2.8* 2.6* 3.3* 2.9* 3.1*   BILIRUBIN mg/dL 1.0 1.1 1.2 3.7* 4.5* 6.9*   ALK PHOS U/L 222* 261* 240* 273* 277* 934*   AST (SGOT) U/L 20 26 46* 94* 107* 154*   ALT (SGPT) U/L 42* 64* 83* 120* 141* 172*   LIPASE U/L  --   --  8*  --   --  11*   CALCIUM mg/dL 9.4 10.0 9.3 8.8 9.0 9.3                 Microbiology   Microbiology Results (last 10 days)       Procedure Component Value - Date/Time    Blood Culture - Blood, Arm, Right [[id number]]  (Normal) Collected: 10/07/23 1654    Lab Status: Preliminary result Specimen: Blood from Arm, Right Updated: 10/08/23 1700     Blood Culture No growth at 24 hours    Narrative:      Less than seven (7) mL's of blood was collected.  Insufficient quantity may yield false negative results.    Respiratory Panel PCR w/COVID-19(SARS-CoV-2) IVON/ANNIE/ADRIA/PAD/[facility]/MAD/ALDAIR In-House, NP Swab in UTM/VTM, 3-4 HR TAT - Swab, Nasopharynx [[id number]]  (Normal) Collected: 10/05/23 2051    Lab Status: Final result Specimen: Swab from Nasopharynx Updated: 10/05/23 2149     ADENOVIRUS, PCR  Not Detected     Coronavirus 229E Not Detected     Coronavirus HKU1 Not Detected     Coronavirus NL63 Not Detected     Coronavirus OC43 Not Detected     COVID19 Not Detected     Human Metapneumovirus Not Detected     Human Rhinovirus/Enterovirus Not Detected     Influenza A PCR Not Detected     Influenza B PCR Not Detected     Parainfluenza Virus 1 Not Detected     Parainfluenza Virus 2 Not Detected     Parainfluenza Virus 3 Not Detected     Parainfluenza Virus 4 Not Detected     RSV, PCR Not Detected     Bordetella pertussis pcr Not Detected     Bordetella parapertussis PCR Not Detected     Chlamydophila pneumoniae PCR Not Detected     Mycoplasma pneumo by PCR Not Detected    Narrative:      In the setting of a positive respiratory panel with a viral infection PLUS a negative procalcitonin without other underlying concern for bacterial infection, consider observing off antibiotics or discontinuation of antibiotics and continue supportive care. If the respiratory panel is positive for atypical bacterial infection (Bordetella pertussis, Chlamydophila pneumoniae, or Mycoplasma pneumoniae), consider antibiotic de-escalation to target atypical bacterial infection.    MRSA Screen, PCR (Inpatient) - Swab, Nares [[id number]]  (Abnormal) Collected: 10/05/23 2051    Lab Status: Final result Specimen: Swab from Nares Updated: 10/05/23 2218     MRSA PCR MRSA Detected    Narrative:      The negative predictive value of this diagnostic test is high and should only be used to consider de-escalating anti-MRSA therapy. A positive result may indicate colonization with MRSA and must be correlated clinically.    Blood Culture - Blood, Arm, Left [[id number]]  (Abnormal) Collected: 10/05/23 1238    Lab Status: Preliminary result Specimen: Blood from Arm, Left Updated: 10/09/23 0650     Blood Culture Enterococcus species     Comment: Infectious disease consultation is highly recommended.  ID/MICs to follow        Isolated from Aerobic  Bottle     Blood Culture Escherichia coli     Comment: Refer to previous blood culture collected on 10/05/2023 1109 for MICs.           Isolated from Anaerobic Bottle     Gram Stain Aerobic Bottle Gram positive cocci in pairs and chains      Anaerobic Bottle Gram negative bacilli      --     Comment: The previously reported stain Aerobic Bottle Gram positive bacilli is no longer being reported.       Blood Culture ID, PCR - Blood, Arm, Left [[id number]]  (Abnormal) Collected: 10/05/23 1238    Lab Status: Final result Specimen: Blood from Arm, Left Updated: 10/07/23 1322     BCID, PCR Enterococcus faecium. Irene/B (vancomycin resistance gene) not detected. Identification by BCID2 PCR.     BOTTLE TYPE Aerobic Bottle    Narrative:      Infectious disease consultation is highly recommended to rule out distant foci of infection.    Blood Culture - Blood, Arm, Right [[id number]]  (Abnormal)  (Susceptibility) Collected: 10/05/23 1109    Lab Status: Final result Specimen: Blood from Arm, Right Updated: 10/08/23 0614     Blood Culture Escherichia coli     Isolated from Anaerobic Bottle     Gram Stain Anaerobic Bottle Gram negative bacilli    Narrative:      Less than seven (7) mL's of blood was collected.  Insufficient quantity may yield false negative results.    Susceptibility        Escherichia coli      HUEY      Ampicillin Susceptible      Ampicillin + Sulbactam Susceptible      Cefepime Susceptible      Ceftazidime Susceptible      Ceftriaxone Susceptible      Gentamicin Susceptible      Levofloxacin Susceptible      Piperacillin + Tazobactam Susceptible      Trimethoprim + Sulfamethoxazole Susceptible                       Susceptibility Comments       Escherichia coli    Cefazolin sensitivity will not be reported for Enterobacteriaceae in non-urine isolates. If cefazolin is preferred, please call the microbiology lab to request an E-test.  With the exception of urinary-sourced infections, aminoglycosides should not be  used as monotherapy.               Blood Culture ID, PCR - Blood, Arm, Right [[id number]]  (Abnormal) Collected: 10/05/23 1109    Lab Status: Final result Specimen: Blood from Arm, Right Updated: 10/06/23 0825     BCID, PCR Escherichia coli. Identification by BCID2 PCR.     BOTTLE TYPE Anaerobic Bottle    Narrative:      No resistance genes detected.            Medication Review:       Schedule Meds  ampicillin, 2 g, Intravenous, Q4H  bisoprolol, 5 mg, Oral, Q24H  enoxaparin, 40 mg, Subcutaneous, BID  pantoprazole, 40 mg, Intravenous, Q AM  sodium chloride, 10 mL, Intravenous, Q12H  ursodiol, 500 mg, Oral, Q12H        Infusion Meds         PRN Meds    Calcium Replacement - Follow Nurse / BPA Driven Protocol    hydrALAZINE    Magnesium Standard Dose Replacement - Follow Nurse / BPA Driven Protocol    Morphine    ondansetron **OR** ondansetron    oxyCODONE    Phosphorus Replacement - Follow Nurse / BPA Driven Protocol    Potassium Replacement - Follow Nurse / BPA Driven Protocol    sodium chloride    sodium chloride    sodium chloride    temazepam        Assessment & Plan       Antimicrobial Therapy   1.  IV Zosyn        2.  IV ampicillin        3.        4.        5.            Assessment    Polymicrobial bacteremia with E. coli and Enterococcus faecium.  Final susceptibilities are pending.  Most likely secondary to biliary source.  Repeat blood cultures from 10/7/2023 are negative so far    Resolved septic shock secondary to above    Elevated transaminase and hyperbilirubinemia.  Almost resolved.  Most likely secondary to passed biliary stone.  Patient s/p ERCP on October 6, 2023    S/p cholecystectomy in 1994      Plan    Discontinue Zosyn   Start IV ampicillin 12 g continuous infusion for 14 days total treatment-last day on 10/21/2023  Patient already has a PICC line-please remove once IV antibiotics are completed  Labs CBC and creatinine x2 weeks  We will continue to monitor the Enterococcus blood culture -Case  discussed with microbiology and this is set up for additional testing-so may take several days for that susceptibilities to result  Continue supportive care  Okay to discharge from Infectious Disease standpoint  Ambulatory care orders for PICC line care and labs have been entered    Patient can return to work after IV antibiotics are completed from ID standpoint    Please fax all post discharge lab results, imaging studies and correspondence to this fax number [phone]  For any question or concern please contact our service number [phone]      Constance Bnauelos, SANYA  10/09/23  14:26 EDT    Note is dictated utilizing voice recognition software/Dragon

## 2023-10-10 VITALS
HEART RATE: 74 BPM | TEMPERATURE: 98 F | WEIGHT: 251.77 LBS | OXYGEN SATURATION: 98 % | RESPIRATION RATE: 18 BRPM | DIASTOLIC BLOOD PRESSURE: 98 MMHG | BODY MASS INDEX: 44.61 KG/M2 | SYSTOLIC BLOOD PRESSURE: 154 MMHG | HEIGHT: 63 IN

## 2023-10-10 DIAGNOSIS — R78.81 BACTEREMIA: Primary | ICD-10-CM

## 2023-10-10 LAB
ALBUMIN SERPL-MCNC: 2.9 G/DL (ref 3.5–5.2)
ALBUMIN/GLOB SERPL: 1 G/DL
ALP SERPL-CCNC: 205 U/L (ref 39–117)
ALT SERPL W P-5'-P-CCNC: 34 U/L (ref 1–33)
ANION GAP SERPL CALCULATED.3IONS-SCNC: 10 MMOL/L (ref 5–15)
AST SERPL-CCNC: 19 U/L (ref 1–32)
BILIRUB SERPL-MCNC: 0.9 MG/DL (ref 0–1.2)
BUN SERPL-MCNC: 16 MG/DL (ref 6–20)
BUN/CREAT SERPL: 23.2 (ref 7–25)
CALCIUM SPEC-SCNC: 8.9 MG/DL (ref 8.6–10.5)
CHLORIDE SERPL-SCNC: 109 MMOL/L (ref 98–107)
CO2 SERPL-SCNC: 24 MMOL/L (ref 22–29)
CREAT SERPL-MCNC: 0.69 MG/DL (ref 0.57–1)
DEPRECATED RDW RBC AUTO: 43.8 FL (ref 37–54)
EGFRCR SERPLBLD CKD-EPI 2021: 102.6 ML/MIN/1.73
EOSINOPHIL # BLD MANUAL: 0.19 10*3/MM3 (ref 0–0.4)
EOSINOPHIL NFR BLD MANUAL: 2 % (ref 0.3–6.2)
ERYTHROCYTE [DISTWIDTH] IN BLOOD BY AUTOMATED COUNT: 13.9 % (ref 12.3–15.4)
GLOBULIN UR ELPH-MCNC: 2.9 GM/DL
GLUCOSE SERPL-MCNC: 101 MG/DL (ref 65–99)
HCT VFR BLD AUTO: 34.3 % (ref 34–46.6)
HGB BLD-MCNC: 11.4 G/DL (ref 12–15.9)
LYMPHOCYTES # BLD MANUAL: 2.14 10*3/MM3 (ref 0.7–3.1)
LYMPHOCYTES NFR BLD MANUAL: 7 % (ref 5–12)
MAGNESIUM SERPL-MCNC: 1.6 MG/DL (ref 1.6–2.6)
MCH RBC QN AUTO: 28.2 PG (ref 26.6–33)
MCHC RBC AUTO-ENTMCNC: 33.2 G/DL (ref 31.5–35.7)
MCV RBC AUTO: 85 FL (ref 79–97)
MONOCYTES # BLD: 0.65 10*3/MM3 (ref 0.1–0.9)
MYELOCYTES NFR BLD MANUAL: 1 % (ref 0–0)
NEUTROPHILS # BLD AUTO: 6.23 10*3/MM3 (ref 1.7–7)
NEUTROPHILS NFR BLD MANUAL: 64 % (ref 42.7–76)
NEUTS BAND NFR BLD MANUAL: 3 % (ref 0–5)
PLATELET # BLD AUTO: 103 10*3/MM3 (ref 140–450)
PMV BLD AUTO: 10.3 FL (ref 6–12)
POTASSIUM SERPL-SCNC: 3.5 MMOL/L (ref 3.5–5.2)
PROT SERPL-MCNC: 5.8 G/DL (ref 6–8.5)
RBC # BLD AUTO: 4.03 10*6/MM3 (ref 3.77–5.28)
RBC MORPH BLD: NORMAL
SCAN SLIDE: NORMAL
SMALL PLATELETS BLD QL SMEAR: ABNORMAL
SODIUM SERPL-SCNC: 143 MMOL/L (ref 136–145)
VARIANT LYMPHS NFR BLD MANUAL: 2 % (ref 0–5)
VARIANT LYMPHS NFR BLD MANUAL: 21 % (ref 19.6–45.3)
WBC MORPH BLD: NORMAL
WBC NRBC COR # BLD: 9.3 10*3/MM3 (ref 3.4–10.8)

## 2023-10-10 PROCEDURE — 25010000002 AMPICILLIN PER 500 MG: Performed by: INTERNAL MEDICINE

## 2023-10-10 PROCEDURE — 80053 COMPREHEN METABOLIC PANEL: CPT | Performed by: INTERNAL MEDICINE

## 2023-10-10 PROCEDURE — 83735 ASSAY OF MAGNESIUM: CPT | Performed by: PHYSICIAN ASSISTANT

## 2023-10-10 PROCEDURE — 85007 BL SMEAR W/DIFF WBC COUNT: CPT | Performed by: INTERNAL MEDICINE

## 2023-10-10 PROCEDURE — 85025 COMPLETE CBC W/AUTO DIFF WBC: CPT | Performed by: INTERNAL MEDICINE

## 2023-10-10 RX ORDER — SODIUM CHLORIDE 0.9 % (FLUSH) 0.9 %
10 SYRINGE (ML) INJECTION AS NEEDED
OUTPATIENT
Start: 2023-10-10

## 2023-10-10 RX ORDER — POTASSIUM CHLORIDE 20 MEQ/1
40 TABLET, EXTENDED RELEASE ORAL EVERY 4 HOURS
Qty: 4 TABLET | Refills: 0 | Status: COMPLETED | OUTPATIENT
Start: 2023-10-10 | End: 2023-10-10

## 2023-10-10 RX ORDER — SODIUM CHLORIDE 0.9 % (FLUSH) 0.9 %
20 SYRINGE (ML) INJECTION AS NEEDED
OUTPATIENT
Start: 2023-10-10

## 2023-10-10 RX ADMIN — POTASSIUM CHLORIDE 40 MEQ: 1500 TABLET, EXTENDED RELEASE ORAL at 09:54

## 2023-10-10 RX ADMIN — AMPICILLIN INJECTION 2 G: 2 POWDER, FOR SOLUTION INTRAMUSCULAR; INTRAVENOUS at 02:00

## 2023-10-10 RX ADMIN — BISOPROLOL FUMARATE 5 MG: 5 TABLET ORAL at 08:26

## 2023-10-10 RX ADMIN — POTASSIUM CHLORIDE 40 MEQ: 1500 TABLET, EXTENDED RELEASE ORAL at 05:55

## 2023-10-10 RX ADMIN — URSODIOL 500 MG: 500 TABLET, FILM COATED ORAL at 08:26

## 2023-10-10 RX ADMIN — PANTOPRAZOLE SODIUM 40 MG: 40 INJECTION, POWDER, LYOPHILIZED, FOR SOLUTION INTRAVENOUS at 05:55

## 2023-10-10 RX ADMIN — AMPICILLIN INJECTION 2 G: 2 POWDER, FOR SOLUTION INTRAMUSCULAR; INTRAVENOUS at 05:55

## 2023-10-10 RX ADMIN — AMPICILLIN INJECTION 2 G: 2 POWDER, FOR SOLUTION INTRAMUSCULAR; INTRAVENOUS at 09:55

## 2023-10-10 RX ADMIN — Medication 10 ML: at 08:26

## 2023-10-10 NOTE — CASE MANAGEMENT/SOCIAL WORK
Case Management Discharge Note      Final Note: [facility] for IV [facility]x.  [facility] for labs and line care.       Dialysis/Infusion Coordination complete.      Service Provider Selected Services Address Phone Fax Patient Preferred    [facility] Infusion and IV Therapy [address], [facility] [address] [phone] --     [facility] IV [facility] [address] [facility] [id number] [phone] --           Transportation Services  Private: Car    Final Discharge Disposition Code: 01 - home or self-care

## 2023-10-10 NOTE — PROGRESS NOTES
[facility] Medicine Services   Daily Progress Note    Patient Name: Idalia Oliver  : 1970  MRN: [id number]  Primary Care Physician:  Alessandra Barbour MD  Date of admission: 10/5/2023  Date and Time of Service:       Subjective      Chief Complaint:     Patient feelingbetter today  No chills or sweats  Mild nausea but no vomiting  No chest pain or SOB  No new complaints           Objective      Vitals:   Temp:  [97.7 øF (36.5 øC)-98.4 øF (36.9 øC)] 98 øF (36.7 øC)  Heart Rate:  [69-80] 74  Resp:  [12-18] 18  BP: (143-159)/(80-99) 154/98  Flow (L/min):  [2] 2    Physical Exam  General: No acute distress  HEENT: Neck supple, normal oral mucosa, no masses, no lymphadenopathy  Lungs: Clear bilaterally, no wheezing, no crackles, no rhonchi. Equal excursions.   CV - Normal S1/S2, no murmur, regular rate and rhythm   Abdomen - Soft, nontender, nondistended, normal bowel sounds  Extremities - no edema, no erythema  Neuro - No focal weakness, normal sensation  Psych - Alert and oriented x3  Skin - no wounds or lesions.          Result Review    Result Review:  I have personally reviewed the results from the time of this admission to 10/10/2023 11:52 EDT and agree with these findings:  [x]  Laboratory  [x]  Microbiology  [x]  Radiology  [x]  EKG/Telemetry   [x]  Cardiology/Vascular   []  Pathology  [x]  Old records  []  Other:  Most notable findings include:           Assessment & Plan      Brief Patient Summary:  Idalia Oliver is a 53 y.o. female who       ampicillin, 2 g, Intravenous, Q4H  bisoprolol, 5 mg, Oral, Q24H  enoxaparin, 40 mg, Subcutaneous, BID  pantoprazole, 40 mg, Intravenous, Q AM  sodium chloride, 10 mL, Intravenous, Q12H  ursodiol, 500 mg, Oral, Q12H               Active Hospital Problems:  Active Hospital Problems    Diagnosis     **Septic shock     RUQ abdominal pain     Elevated liver enzymes      Plan:     Septic shock  - E Coli Bacteremia  - s/p Pressors d/c  - suspected biliary  source  - Continue IV Abx per ID - Ceftriaxone.   - Blood culture growing Enterococcus.    - Follow repeat Blood culture - NGTD    Elevated liver enzymes  - Elevated Bilirubin  - s/p ERCP 10/6 - no evidence of stone or debis, likely passed stones  - monitor     CONCEPCIÓN  - Bisoprolol and HCTZ on hold  - resolved. D/c fluids     HTN  - resume Bisoprolol   - BP elevated. If remains elevated will resume home HCTZ     Acute thrombocytopenia  - likely related to sepsis. Monitor. Trending up    Normocytic anemia - seems to be anemia of chronic disease. OP follow up    Morbid Obesity - Body mass index is 44.6 kg/mý.       Patient ready for discharge but Home Abx wont be delivered till tomorrow. Plan for d/c tomorrow     DVT prophylaxis:  Medical and mechanical DVT prophylaxis orders are present.    CODE STATUS:    Level Of Support Discussed With: Patient  Code Status (Patient has no pulse and is not breathing): CPR (Attempt to Resuscitate)  Medical Interventions (Patient has pulse or is breathing): Full Support      Disposition:  I expect patient to be discharged .    This patient has been  and discussed with . 10/10/23      Electronically signed by Navi Vasquez MD, 10/10/23, 11:52 EDT.  [facility] Hospitalist Team

## 2023-10-10 NOTE — PROGRESS NOTES
Infectious Diseases Progress Note      LOS: 5 days   Patient Care Team:  Alessandra Barbour MD as PCP - General (Family Medicine)    Chief Complaint: No complaints today    Subjective     The patient had no fever during the last 24 hours.  She remained hemodynamically stable.  She is tolerating antimicrobial therapy and is currently on room air    Review of Systems:   Review of Systems   Constitutional: Negative.    HENT: Negative.     Eyes: Negative.    Respiratory:  Positive for cough.    Cardiovascular: Negative.    Gastrointestinal: Negative.    Genitourinary: Negative.    Musculoskeletal: Negative.    Skin: Negative.    Neurological: Negative.    Hematological: Negative.    Psychiatric/Behavioral: Negative.          Objective     Vital Signs  Temp:  [97.7 øF (36.5 øC)-98.1 øF (36.7 øC)] 98 øF (36.7 øC)  Heart Rate:  [73-80] 74  Resp:  [14-18] 18  BP: (145-159)/(80-99) 154/98    Physical Exam:  Physical Exam  Vitals and nursing note reviewed.   Constitutional:       Appearance: She is well-developed.   HENT:      Head: Normocephalic and atraumatic.   Eyes:      Pupils: Pupils are equal, round, and reactive to light.   Cardiovascular:      Rate and Rhythm: Normal rate and regular rhythm.      Heart sounds: Normal heart sounds.   Pulmonary:      Effort: Pulmonary effort is normal. No respiratory distress.      Breath sounds: Normal breath sounds. No wheezing or rales.   Abdominal:      General: Bowel sounds are normal. There is no distension.      Palpations: Abdomen is soft. There is no mass.      Tenderness: There is no abdominal tenderness. There is no guarding or rebound.   Musculoskeletal:         General: No deformity. Normal range of motion.      Cervical back: Normal range of motion and neck supple.   Skin:     General: Skin is warm.      Findings: No erythema or rash.   Neurological:      Mental Status: She is alert and oriented to person, place, and time.      Cranial Nerves: No cranial nerve deficit.           Results Review:    I have reviewed all clinical data, test, lab, and imaging results.     Radiology  No Radiology Exams Resulted Within Past 24 Hours    Cardiology    Laboratory    Results from last 7 days   Lab Units 10/10/23  0416 10/09/23  0517 10/08/23  0442 10/07/23  0518 10/06/23  0639 10/05/23  1106   WBC 10*3/mm3 9.30 10.20 18.60* 18.40* 26.50* 11.50*   HEMOGLOBIN g/dL 11.4* 11.2* 11.5* 11.2* 11.9* 13.9   HEMATOCRIT % 34.3 34.0 35.4 34.2 36.2 41.3   PLATELETS 10*3/mm3 103* 110* 127* 104* 106* 146     Results from last 7 days   Lab Units 10/10/23  0416 10/09/23  0517 10/08/23  0442 10/07/23  0518 10/06/23  0639 10/05/23  2154 10/05/23  1106   SODIUM mmol/L 143 140 143 139 141 139 139   POTASSIUM mmol/L 3.5 3.7 4.4 4.6 4.4 3.3* 3.6   CHLORIDE mmol/L 109* 107 111* 108* 106 105 104   CO2 mmol/L 24.0 24.0 25.0 22.0 23.0 22.0 17.0*   BUN mg/dL 16 27* 25* 21* 27* 28* 22*   CREATININE mg/dL 0.69 0.86 0.83 0.64 1.30* 1.69* 1.43*   GLUCOSE mg/dL 101* 79 99 102* 78 99 89   ALBUMIN g/dL 2.9* 2.8* 2.8* 2.6* 3.3* 2.9* 3.1*   BILIRUBIN mg/dL 0.9 1.0 1.1 1.2 3.7* 4.5* 6.9*   ALK PHOS U/L 205* 222* 261* 240* 273* 277* 934*   AST (SGOT) U/L 19 20 26 46* 94* 107* 154*   ALT (SGPT) U/L 34* 42* 64* 83* 120* 141* 172*   LIPASE U/L  --   --   --  8*  --   --  11*   CALCIUM mg/dL 8.9 9.4 10.0 9.3 8.8 9.0 9.3                 Microbiology   Microbiology Results (last 10 days)       Procedure Component Value - Date/Time    Blood Culture - Blood, Arm, Right [[id number]]  (Normal) Collected: 10/07/23 1654    Lab Status: Preliminary result Specimen: Blood from Arm, Right Updated: 10/09/23 1700     Blood Culture No growth at 2 days    Narrative:      Less than seven (7) mL's of blood was collected.  Insufficient quantity may yield false negative results.    Respiratory Panel PCR w/COVID-19(SARS-CoV-2) [facility] In-House, NP Swab in UTM/VTM, 3-4 HR TAT - Swab, Nasopharynx [[id number]]  (Normal) Collected: 10/05/23  2051    Lab Status: Final result Specimen: Swab from Nasopharynx Updated: 10/05/23 2149     ADENOVIRUS, PCR Not Detected     Coronavirus 229E Not Detected     Coronavirus HKU1 Not Detected     Coronavirus NL63 Not Detected     Coronavirus OC43 Not Detected     COVID19 Not Detected     Human Metapneumovirus Not Detected     Human Rhinovirus/Enterovirus Not Detected     Influenza A PCR Not Detected     Influenza B PCR Not Detected     Parainfluenza Virus 1 Not Detected     Parainfluenza Virus 2 Not Detected     Parainfluenza Virus 3 Not Detected     Parainfluenza Virus 4 Not Detected     RSV, PCR Not Detected     Bordetella pertussis pcr Not Detected     Bordetella parapertussis PCR Not Detected     Chlamydophila pneumoniae PCR Not Detected     Mycoplasma pneumo by PCR Not Detected    Narrative:      In the setting of a positive respiratory panel with a viral infection PLUS a negative procalcitonin without other underlying concern for bacterial infection, consider observing off antibiotics or discontinuation of antibiotics and continue supportive care. If the respiratory panel is positive for atypical bacterial infection (Bordetella pertussis, Chlamydophila pneumoniae, or Mycoplasma pneumoniae), consider antibiotic de-escalation to target atypical bacterial infection.    MRSA Screen, PCR (Inpatient) - Swab, Nares [[id number]]  (Abnormal) Collected: 10/05/23 2051    Lab Status: Final result Specimen: Swab from Nares Updated: 10/05/23 2218     MRSA PCR MRSA Detected    Narrative:      The negative predictive value of this diagnostic test is high and should only be used to consider de-escalating anti-MRSA therapy. A positive result may indicate colonization with MRSA and must be correlated clinically.    Blood Culture - Blood, Arm, Left [[id number]]  (Abnormal)  (Susceptibility) Collected: 10/05/23 1238    Lab Status: Preliminary result Specimen: Blood from Arm, Left Updated: 10/10/23 0903     Blood Culture Enterococcus  gallinarum     Comment: Intrinsically resistant to vancomycin.    Daptomycin to follow.        Isolated from Aerobic Bottle     Blood Culture Escherichia coli     Comment: Refer to previous blood culture collected on 10/05/2023 1109 for MICs.           Isolated from Anaerobic Bottle     Gram Stain Aerobic Bottle Gram positive cocci in pairs and chains      Anaerobic Bottle Gram negative bacilli      --     Comment: The previously reported stain Aerobic Bottle Gram positive bacilli is no longer being reported.       Narrative:      Discrepancies between the [facility] BCID2 Panel test result and other microbial identification methods may be caused by the inability to reliably differentiate closely related species based on standard phenotypic microbial identification methods or the design of other molecular assays.    Susceptibility        Enterococcus gallinarum      HUEY (Preliminary)      Ampicillin Susceptible      Gentamicin High Level Synergy Susceptible      Linezolid Susceptible      Vancomycin Resistant                           Blood Culture ID, PCR - Blood, Arm, Left [[id number]]  (Abnormal) Collected: 10/05/23 1238    Lab Status: Final result Specimen: Blood from Arm, Left Updated: 10/07/23 1322     BCID, PCR Enterococcus faecium. Irene/B (vancomycin resistance gene) not detected. Identification by BCID2 PCR.     BOTTLE TYPE Aerobic Bottle    Narrative:      Infectious disease consultation is highly recommended to rule out distant foci of infection.    Blood Culture - Blood, Arm, Right [[id number]]  (Abnormal)  (Susceptibility) Collected: 10/05/23 1109    Lab Status: Final result Specimen: Blood from Arm, Right Updated: 10/08/23 0614     Blood Culture Escherichia coli     Isolated from Anaerobic Bottle     Gram Stain Anaerobic Bottle Gram negative bacilli    Narrative:      Less than seven (7) mL's of blood was collected.  Insufficient quantity may yield false negative results.    Susceptibility         Escherichia coli      HUEY      Ampicillin Susceptible      Ampicillin + Sulbactam Susceptible      Cefepime Susceptible      Ceftazidime Susceptible      Ceftriaxone Susceptible      Gentamicin Susceptible      Levofloxacin Susceptible      Piperacillin + Tazobactam Susceptible      Trimethoprim + Sulfamethoxazole Susceptible                       Susceptibility Comments       Escherichia coli    Cefazolin sensitivity will not be reported for Enterobacteriaceae in non-urine isolates. If cefazolin is preferred, please call the microbiology lab to request an E-test.  With the exception of urinary-sourced infections, aminoglycosides should not be used as monotherapy.               Blood Culture ID, PCR - Blood, Arm, Right [[id number]]  (Abnormal) Collected: 10/05/23 1109    Lab Status: Final result Specimen: Blood from Arm, Right Updated: 10/06/23 0825     BCID, PCR Escherichia coli. Identification by BCID2 PCR.     BOTTLE TYPE Anaerobic Bottle    Narrative:      No resistance genes detected.            Medication Review:       Schedule Meds  ampicillin, 2 g, Intravenous, Q4H  bisoprolol, 5 mg, Oral, Q24H  enoxaparin, 40 mg, Subcutaneous, BID  pantoprazole, 40 mg, Intravenous, Q AM  sodium chloride, 10 mL, Intravenous, Q12H  ursodiol, 500 mg, Oral, Q12H        Infusion Meds         PRN Meds    Calcium Replacement - Follow Nurse / BPA Driven Protocol    hydrALAZINE    Magnesium Standard Dose Replacement - Follow Nurse / BPA Driven Protocol    Morphine    ondansetron **OR** ondansetron    oxyCODONE    Phosphorus Replacement - Follow Nurse / BPA Driven Protocol    Potassium Replacement - Follow Nurse / BPA Driven Protocol    sodium chloride    sodium chloride    sodium chloride    temazepam        Assessment & Plan       Antimicrobial Therapy   1.  IV Zosyn        2.  IV ampicillin        3.        4.        5.            Assessment    Polymicrobial bacteremia with E. coli and Enterococcus gallinarum which is susceptible  to ampicillin Most likely secondary to biliary source.  Repeat blood cultures from 10/7/2023 are negative so far    Resolved septic shock secondary to above    Elevated transaminase and hyperbilirubinemia.  Almost resolved.  Most likely secondary to passed biliary stone.  Patient s/p ERCP on October 6, 2023    S/p cholecystectomy in 1994      Plan    Continue IV ampicillin 12 g continuous infusion for 14 days total treatment-last day on 10/21/2023  Patient already has a PICC line-please remove once IV antibiotics are completed  Labs CBC and creatinine x2 weeks  Continue supportive care  Okay to discharge from Infectious Disease standpoint  Ambulatory care orders for PICC line care and labs have been entered    Patient can return to work after IV antibiotics are completed from ID standpoint    Please fax all post discharge lab results, imaging studies and correspondence to this fax number [phone]  For any question or concern please contact our service number [phone]    The above note was transcribed for Dr. Gillis-physical exam and review of systems were performed by him      Constance Banuelos, SANYA  10/10/23  13:23 EDT    Note is dictated utilizing voice recognition software/Dragon

## 2023-10-10 NOTE — DISCHARGE SUMMARY
[facility] Medicine Services  Discharge Summary    Date of Service: 10/10/23    Patient Name: Idalia Oliver  : 1970  MRN: [id number]    Date of Admission: 10/5/2023  Discharge Diagnosis: Septic shock, E. coli bacteremia, Enterococcus bacteremia, elevated liver enzymes, elevated bilirubin, CONCEPCIÓN, acute thrombocytopenia.  Date of Discharge:  10/10/23  Patient condition: Stable    Primary Care Physician: Alessandra Barbour MD      Presenting Problem:   Sepsis [A41.9]  Severe sepsis [A41.9, R65.20]    Active and Resolved Hospital Problems:  Active Hospital Problems    Diagnosis POA    **Septic shock [A41.9, R65.21] Yes    RUQ abdominal pain [R10.11] Unknown    Elevated liver enzymes [R74.8] Unknown      Resolved Hospital Problems   No resolved problems to display.         Hospital Course     Hospital Course:  Idalia Oliver is a 53 y.o. female Patient is a 53-year-old female who presented emergency room with chief complaint abdominal pain was found to have septic shock secondary to E. coli bacteremia as well as Enterococcus bacteremia.  Suspected due to biliary source.  Patient was seen by infectious disease started on IV antibiotics and transition to Unasyn to complete course per infectious disease recommendations as an outpatient.  Patient also was found to have elevated liver enzymes and elevated bilirubin had an ERCP on 10/6/2023 with no evidence of stone or debris likely passed stone.  Patient also had acute kidney injury that resolved with fluids.  Also had acute thrombocytopenia likely with sepsis that has been trending up.  She is feeling good with no complaints and stable for discharge with outpatient follow-up with her regular physician as well as GI.        DISCHARGE Follow Up Recommendations for labs and diagnostics:       Reasons For Change In Medications and Indications for New Medications:      Day of Discharge     Vital Signs:  Temp:  [97.7 øF (36.5 øC)-98.4 øF (36.9 øC)] 98 øF  (36.7 øC)  Heart Rate:  [69-80] 74  Resp:  [12-18] 18  BP: (143-159)/(80-99) 154/98  Flow (L/min):  [2] 2    Physical Exam:  Physical Exam   General: No acute distress  HEENT: Neck supple, normal oral mucosa, no masses, no lymphadenopathy  Lungs: Clear bilaterally, no wheezing, no crackles, no rhonchi. Equal excursions.   CV - Normal S1/S2, no murmur, regular rate and rhythm   Abdomen - Soft, nontender, nondistended, normal bowel sounds  Extremities - no edema, no erythema  Neuro - No focal weakness, normal sensation  Psych - Alert and oriented x3  Skin - no wounds or lesions.         Pertinent  and/or Most Recent Results     LAB RESULTS:      Lab 10/10/23  0416 10/09/23  0517 10/08/23  0442 10/07/23  0518 10/06/23  0639 10/06/23  0211 10/05/23  2154 10/05/23  1331 10/05/23  1107 10/05/23  1106   WBC 9.30 10.20 18.60* 18.40* 26.50*  --   --   --   --  11.50*   HEMOGLOBIN 11.4* 11.2* 11.5* 11.2* 11.9*  --   --   --   --  13.9   HEMATOCRIT 34.3 34.0 35.4 34.2 36.2  --   --   --   --  41.3   PLATELETS 103* 110* 127* 104* 106*  --   --   --   --  146   NEUTROS ABS 6.23 6.83 16.74* 17.20* 23.90*  --   --   --   --  11.10*   LYMPHS ABS  --   --   --  0.60* 1.50  --   --   --   --  0.20*   MONOS ABS  --   --   --  0.30 0.90  --   --   --   --  0.10   EOS ABS 0.19 0.10  --  0.30 0.20  --   --   --   --  0.00   MCV 85.0 86.7 87.2 86.2 86.2  --   --   --   --  86.8   PROCALCITONIN  --   --   --   --  96.19*  --   --   --   --  24.09*   LACTATE  --   --   --   --  2.0 2.0 2.7* 5.2* 5.1*  --    PROTIME  --   --   --   --   --   --   --   --   --  14.2*   APTT  --   --   --   --   --   --   --   --   --  28.0         Lab 10/10/23  0416 10/09/23  0517 10/08/23  0442 10/07/23  0518 10/06/23  0639 10/05/23  2154   SODIUM 143 140 143 139 141 139   POTASSIUM 3.5 3.7 4.4 4.6 4.4 3.3*   CHLORIDE 109* 107 111* 108* 106 105   CO2 24.0 24.0 25.0 22.0 23.0 22.0   ANION GAP 10.0 9.0 7.0 9.0 12.0 12.0   BUN 16 27* 25* 21* 27* 28*    CREATININE 0.69 0.86 0.83 0.64 1.30* 1.69*   EGFR 102.6 79.9 83.4 104.5 48.7* 35.5*   GLUCOSE 101* 79 99 102* 78 99   CALCIUM 8.9 9.4 10.0 9.3 8.8 9.0   MAGNESIUM 1.6  --  2.0  --  3.4* 1.4*   PHOSPHORUS  --   --  2.3*  --  3.1 3.1         Lab 10/10/23  0416 10/09/23  0517 10/08/23  0442 10/07/23  0518 10/06/23  0639 10/05/23  2154 10/05/23  1106   TOTAL PROTEIN 5.8* 5.3* 6.0 5.8* 5.9*   < > 6.0   ALBUMIN 2.9* 2.8* 2.8* 2.6* 3.3*   < > 3.1*   GLOBULIN 2.9 2.5 3.2 3.2 2.6   < > 2.9   ALT (SGPT) 34* 42* 64* 83* 120*   < > 172*   AST (SGOT) 19 20 26 46* 94*   < > 154*   BILIRUBIN 0.9 1.0 1.1 1.2 3.7*   < > 6.9*   ALK PHOS 205* 222* 261* 240* 273*   < > 934*   LIPASE  --   --   --  8*  --   --  11*    < > = values in this interval not displayed.         Lab 10/05/23  1331 10/05/23  1106   HSTROP T 17* 13*   PROTIME  --  14.2*   INR  --  1.33*             Lab 10/08/23  0442 10/05/23  1106   IRON 162*  --    IRON SATURATION (TSAT) 61*  --    TIBC 265*  --    TRANSFERRIN 178*  --    FERRITIN 197.00*  --    FOLATE 11.40  --    VITAMIN B 12 1,941*  --    ABO TYPING  --  O   RH TYPING  --  Negative   ANTIBODY SCREEN  --  Negative         Brief Urine Lab Results  (Last result in the past 365 days)        Color   Clarity   Blood   Leuk Est   Nitrite   Protein   CREAT   Urine HCG        10/05/23 1125 Yellow   Cloudy  Comment: Result checked     Small (1+)   Negative   Negative   100 mg/dL (2+)                 Microbiology Results (last 10 days)       Procedure Component Value - Date/Time    Blood Culture - Blood, Arm, Right [[id number]]  (Normal) Collected: 10/07/23 1654    Lab Status: Preliminary result Specimen: Blood from Arm, Right Updated: 10/09/23 1700     Blood Culture No growth at 2 days    Narrative:      Less than seven (7) mL's of blood was collected.  Insufficient quantity may yield false negative results.    Respiratory Panel PCR w/COVID-19(SARS-CoV-2) IVON/ANNIE/ADRIA/PAD/COR/MAD/ALDAIR In-House, NP Swab in [facility] 3-4  HR TAT - Swab, Nasopharynx [[id number]]  (Normal) Collected: 10/05/23 2051    Lab Status: Final result Specimen: Swab from Nasopharynx Updated: 10/05/23 2149     ADENOVIRUS, PCR Not Detected     Coronavirus 229E Not Detected     Coronavirus HKU1 Not Detected     Coronavirus NL63 Not Detected     Coronavirus OC43 Not Detected     COVID19 Not Detected     Human Metapneumovirus Not Detected     Human Rhinovirus/Enterovirus Not Detected     Influenza A PCR Not Detected     Influenza B PCR Not Detected     Parainfluenza Virus 1 Not Detected     Parainfluenza Virus 2 Not Detected     Parainfluenza Virus 3 Not Detected     Parainfluenza Virus 4 Not Detected     RSV, PCR Not Detected     Bordetella pertussis pcr Not Detected     Bordetella parapertussis PCR Not Detected     Chlamydophila pneumoniae PCR Not Detected     Mycoplasma pneumo by PCR Not Detected    Narrative:      In the setting of a positive respiratory panel with a viral infection PLUS a negative procalcitonin without other underlying concern for bacterial infection, consider observing off antibiotics or discontinuation of antibiotics and continue supportive care. If the respiratory panel is positive for atypical bacterial infection (Bordetella pertussis, Chlamydophila pneumoniae, or Mycoplasma pneumoniae), consider antibiotic de-escalation to target atypical bacterial infection.    MRSA Screen, PCR (Inpatient) - Swab, Nares [[id number]]  (Abnormal) Collected: 10/05/23 2051    Lab Status: Final result Specimen: Swab from Nares Updated: 10/05/23 2218     MRSA PCR MRSA Detected    Narrative:      The negative predictive value of this diagnostic test is high and should only be used to consider de-escalating anti-MRSA therapy. A positive result may indicate colonization with MRSA and must be correlated clinically.    Blood Culture - Blood, Arm, Left [[id number]]  (Abnormal)  (Susceptibility) Collected: 10/05/23 1238    Lab Status: Preliminary result Specimen: Blood  from Arm, Left Updated: 10/10/23 0903     Blood Culture Enterococcus gallinarum     Comment: Intrinsically resistant to vancomycin.    Daptomycin to follow.        Isolated from Aerobic Bottle     Blood Culture Escherichia coli     Comment: Refer to previous blood culture collected on 10/05/2023 1109 for MICs.           Isolated from Anaerobic Bottle     Gram Stain Aerobic Bottle Gram positive cocci in pairs and chains      Anaerobic Bottle Gram negative bacilli      --     Comment: The previously reported stain Aerobic Bottle Gram positive bacilli is no longer being reported.       Narrative:      Discrepancies between the Intent [facility] BCID2 Panel test result and other microbial identification methods may be caused by the inability to reliably differentiate closely related species based on standard phenotypic microbial identification methods or the design of other molecular assays.    Susceptibility        Enterococcus gallinarum      HUEY (Preliminary)      Ampicillin Susceptible      Gentamicin High Level Synergy Susceptible      Linezolid Susceptible      Vancomycin Resistant                           Blood Culture ID, PCR - Blood, Arm, Left [[id number]]  (Abnormal) Collected: 10/05/23 1238    Lab Status: Final result Specimen: Blood from Arm, Left Updated: 10/07/23 1322     BCID, PCR Enterococcus faecium. Irene/B (vancomycin resistance gene) not detected. Identification by BCID2 PCR.     BOTTLE TYPE Aerobic Bottle    Narrative:      Infectious disease consultation is highly recommended to rule out distant foci of infection.    Blood Culture - Blood, Arm, Right [[id number]]  (Abnormal)  (Susceptibility) Collected: 10/05/23 1109    Lab Status: Final result Specimen: Blood from Arm, Right Updated: 10/08/23 0614     Blood Culture Escherichia coli     Isolated from Anaerobic Bottle     Gram Stain Anaerobic Bottle Gram negative bacilli    Narrative:      Less than seven (7) mL's of blood was collected.  Insufficient quantity  may yield false negative results.    Susceptibility        Escherichia coli      HUEY      Ampicillin Susceptible      Ampicillin + Sulbactam Susceptible      Cefepime Susceptible      Ceftazidime Susceptible      Ceftriaxone Susceptible      Gentamicin Susceptible      Levofloxacin Susceptible      Piperacillin + Tazobactam Susceptible      Trimethoprim + Sulfamethoxazole Susceptible                       Susceptibility Comments       Escherichia coli    Cefazolin sensitivity will not be reported for Enterobacteriaceae in non-urine isolates. If cefazolin is preferred, please call the microbiology lab to request an E-test.  With the exception of urinary-sourced infections, aminoglycosides should not be used as monotherapy.               Blood Culture ID, PCR - Blood, Arm, Right [[id number]]  (Abnormal) Collected: 10/05/23 1109    Lab Status: Final result Specimen: Blood from Arm, Right Updated: 10/06/23 0825     BCID, PCR Escherichia coli. Identification by BCID2 PCR.     BOTTLE TYPE Anaerobic Bottle    Narrative:      No resistance genes detected.            FL ERCP pancreatic and biliary ducts    Result Date: 10/6/2023  Impression: Impression: Limited intraoperative fluoroscopic views were obtained for surgical support during ERCP. Recommend correlation with real-time findings at the time of the procedure. Electronically Signed: James Barrios MD  10/6/2023 4:00 PM EDT  Workstation ID: [id number]    US Liver    Result Date: 10/6/2023  Impression: Impression: Unremarkable ultrasound appearance of the liver Electronically Signed: Mauro Flores  10/6/2023 7:44 AM EDT  Workstation ID: [id number]    CT Chest Without Contrast Diagnostic    Result Date: 10/5/2023  Impression: Impression: No acute process identified. Electronically Signed: Joshua Butts MD  10/5/2023 1:20 PM CDT  Workstation ID: [id number]    XR Chest 1 View    Result Date: 10/5/2023  Impression: Impression: 1. No acute cardiopulmonary disease. Electronically  Signed: Micheal Lua MD  10/5/2023 12:45 PM EDT  Workstation ID: [id number]    CT Abdomen Pelvis Without Contrast    Result Date: 10/5/2023  Impression: Impression: 1. No acute abnormality in the abdomen or pelvis. 2. Colonic diverticulosis without diverticulitis. 3. Prior cholecystectomy. Electronically Signed: Nasir Awad MD  10/5/2023 12:14 PM EDT  Workstation ID: [id number]                 Labs Pending at Discharge:  Pending Labs       Order Current Status    Blood Culture - Blood, Arm, Left Preliminary result    Blood Culture - Blood, Arm, Right Preliminary result            Procedures Performed  Procedure(s):  ENDOSCOPIC RETROGRADE CHOLANGIOPANCREATOGRAPHYWITH BALLOON CLEAREANCE (12MM - 15MM BALLOON) UP TO 15MM OF BILE DUCT         Consults:   Consults       Date and Time Order Name Status Description    10/6/2023  9:52 AM Inpatient Hospitalist Consult      10/5/2023  1:55 PM Inpatient Gastroenterology Consult Completed     10/5/2023  1:55 PM Inpatient Infectious Diseases Consult Completed     10/5/2023  1:30 PM Hospitalist (on-call MD unless specified)                Discharge Details        Discharge Medications        New Medications        Instructions Start Date   ampicillin 2 g in sodium chloride 0.9 % 100 mL IVPB   2 g, Intravenous, Every 4 Hours      ursodiol 500 MG tablet  Commonly known as: ACTIGALL   500 mg, Oral, Every 12 Hours Scheduled             Continue These Medications        Instructions Start Date   bisoprolol-hydrochlorothiazide 5-6.25 MG per tablet  Commonly known as: ZIAC   1 tablet, Oral, Daily      VITAMIN D3 PO   3,000 Units, Oral, Daily               No Known Allergies      Discharge Disposition: home with OP follow up  Home or Self Care    Diet:  Hospital:  Diet Order   Procedures    Diet: Cardiac Diets; Healthy Heart (2-3 Na+); Texture: Regular Texture (IDDSI 7); Fluid Consistency: Thin (IDDSI 0)         Discharge Activity:   Activity Instructions    As tolerated             CODE  STATUS:  Code Status and Medical Interventions:   Ordered at: 10/05/23 1355     Level Of Support Discussed With:    Patient     Code Status (Patient has no pulse and is not breathing):    CPR (Attempt to Resuscitate)     Medical Interventions (Patient has pulse or is breathing):    Full Support         No future appointments.    Additional Instructions for the Follow-ups that You Need to Schedule    Ambulatory care will call you to set up appointments for PICC line removal and possible lab work.              Time spent on Discharge including face to face service:  31 minutes    This patient has been  and discussed with . 10/10/23      Signature: Electronically signed by Navi Vasquez MD, 10/10/23, 11:51 EDT.  [facility] Hospitalist Team

## 2023-10-10 NOTE — PLAN OF CARE
Problem: Adjustment to Illness (Sepsis/Septic Shock)  Goal: Optimal Coping  Outcome: Ongoing, Progressing     Problem: Bleeding (Sepsis/Septic Shock)  Goal: Absence of Bleeding  Outcome: Ongoing, Progressing     Problem: Glycemic Control Impaired (Sepsis/Septic Shock)  Goal: Blood Glucose Level Within Desired Range  Outcome: Ongoing, Progressing     Problem: Infection Progression (Sepsis/Septic Shock)  Goal: Absence of Infection Signs and Symptoms  Outcome: Ongoing, Progressing  Intervention: Promote Recovery  Recent Flowsheet Documentation  Taken 10/10/2023 0000 by Anusha Greenfield RN  Activity Management: up ad sarah  Taken 10/9/2023 2000 by Anusha Greenfield RN  Activity Management: up ad sarah     Problem: Nutrition Impaired (Sepsis/Septic Shock)  Goal: Optimal Nutrition Intake  Outcome: Ongoing, Progressing     Problem: Skin Injury Risk Increased  Goal: Skin Health and Integrity  Outcome: Ongoing, Progressing     Problem: Fall Injury Risk  Goal: Absence of Fall and Fall-Related Injury  Outcome: Ongoing, Progressing  Intervention: Promote Injury-Free Environment  Recent Flowsheet Documentation  Taken 10/10/2023 0200 by Anusha Greenfield RN  Safety Promotion/Fall Prevention: safety round/check completed  Taken 10/10/2023 0000 by Anusha Greenfield RN  Safety Promotion/Fall Prevention: safety round/check completed  Taken 10/9/2023 2200 by Anusha Greenfield RN  Safety Promotion/Fall Prevention: safety round/check completed  Taken 10/9/2023 2000 by Anusha Greenfield RN  Safety Promotion/Fall Prevention: safety round/check completed     Problem: Adult Inpatient Plan of Care  Goal: Plan of Care Review  Outcome: Ongoing, Progressing  Flowsheets (Taken 10/10/2023 0213)  Progress: improving  Plan of Care Reviewed With: patient  Outcome Evaluation: Pt to dc home today once antibiotics delivered by pharmacy. PICC line in place. Dressing to be changed prior to her dc.  Goal: Patient-Specific Goal (Individualized)  Outcome: Ongoing,  Progressing  Goal: Absence of Hospital-Acquired Illness or Injury  Outcome: Ongoing, Progressing  Intervention: Identify and Manage Fall Risk  Recent Flowsheet Documentation  Taken 10/10/2023 0200 by Anusha Greenfield RN  Safety Promotion/Fall Prevention: safety round/check completed  Taken 10/10/2023 0000 by Anusha Greenfield RN  Safety Promotion/Fall Prevention: safety round/check completed  Taken 10/9/2023 2200 by Anusha Greenfield RN  Safety Promotion/Fall Prevention: safety round/check completed  Taken 10/9/2023 2000 by Anusha Greenfield RN  Safety Promotion/Fall Prevention: safety round/check completed  Intervention: Prevent and Manage VTE (Venous Thromboembolism) Risk  Recent Flowsheet Documentation  Taken 10/10/2023 0000 by Anusha Greenfield RN  Activity Management: up ad sarah  Taken 10/9/2023 2000 by Anusha Greenfield RN  Activity Management: up ad sarah  Goal: Optimal Comfort and Wellbeing  Outcome: Ongoing, Progressing  Goal: Readiness for Transition of Care  Outcome: Ongoing, Progressing   Goal Outcome Evaluation:  Plan of Care Reviewed With: patient        Progress: improving  Outcome Evaluation: Pt to dc home today once antibiotics delivered by pharmacy. PICC line in place. Dressing to be changed prior to her dc.

## 2023-10-11 ENCOUNTER — READMISSION MANAGEMENT (OUTPATIENT)
Dept: CALL CENTER | Facility: HOSPITAL | Age: 53
End: 2023-10-11
Payer: COMMERCIAL

## 2023-10-11 LAB
BACTERIA SPEC AEROBE CULT: ABNORMAL
BACTERIA SPEC AEROBE CULT: ABNORMAL
GRAM STN SPEC: ABNORMAL
ISOLATED FROM: ABNORMAL
ISOLATED FROM: ABNORMAL

## 2023-10-11 NOTE — OUTREACH NOTE
Prep Survey      Flowsheet Row Responses   [facility] facility patient discharged from? [facility]   Is LACE score < 7 ? No   Eligibility Readm Mgmt   Discharge diagnosis **Septic shock   Does the patient have one of the following disease processes/diagnoses(primary or secondary)? Sepsis   Does the patient have Home health ordered? No   Is there a DME ordered? No   Prep survey completed? Yes            Sierra ELMORE - Registered Nurse

## 2023-10-12 LAB — BACTERIA SPEC AEROBE CULT: NORMAL

## 2023-10-16 ENCOUNTER — TRANSCRIBE ORDERS (OUTPATIENT)
Dept: ADMINISTRATIVE | Facility: HOSPITAL | Age: 53
End: 2023-10-16
Payer: COMMERCIAL

## 2023-10-16 DIAGNOSIS — Z12.31 ENCOUNTER FOR SCREENING MAMMOGRAM FOR MALIGNANT NEOPLASM OF BREAST: Primary | ICD-10-CM

## 2023-10-17 ENCOUNTER — HOSPITAL ENCOUNTER (OUTPATIENT)
Dept: INFUSION THERAPY | Facility: HOSPITAL | Age: 53
Discharge: HOME | End: 2023-10-17
Admitting: NURSE PRACTITIONER
Payer: COMMERCIAL

## 2023-10-17 ENCOUNTER — READMISSION MANAGEMENT (OUTPATIENT)
Dept: CALL CENTER | Facility: HOSPITAL | Age: 53
End: 2023-10-17
Payer: COMMERCIAL

## 2023-10-17 VITALS
DIASTOLIC BLOOD PRESSURE: 89 MMHG | TEMPERATURE: 98 F | SYSTOLIC BLOOD PRESSURE: 153 MMHG | HEART RATE: 95 BPM | OXYGEN SATURATION: 99 % | RESPIRATION RATE: 18 BRPM

## 2023-10-17 DIAGNOSIS — R78.81 BACTEREMIA: Primary | ICD-10-CM

## 2023-10-17 LAB
BASOPHILS # BLD AUTO: 0.1 10*3/MM3 (ref 0–0.2)
BASOPHILS NFR BLD AUTO: 0.8 % (ref 0–1.5)
CREAT SERPL-MCNC: 0.72 MG/DL (ref 0.57–1)
DEPRECATED RDW RBC AUTO: 43.3 FL (ref 37–54)
EGFRCR SERPLBLD CKD-EPI 2021: 98.9 ML/MIN/1.73
EOSINOPHIL # BLD AUTO: 0.1 10*3/MM3 (ref 0–0.4)
EOSINOPHIL NFR BLD AUTO: 1.1 % (ref 0.3–6.2)
ERYTHROCYTE [DISTWIDTH] IN BLOOD BY AUTOMATED COUNT: 13.7 % (ref 12.3–15.4)
HCT VFR BLD AUTO: 41.6 % (ref 34–46.6)
HGB BLD-MCNC: 14.2 G/DL (ref 12–15.9)
LYMPHOCYTES # BLD AUTO: 1.7 10*3/MM3 (ref 0.7–3.1)
LYMPHOCYTES NFR BLD AUTO: 22 % (ref 19.6–45.3)
MCH RBC QN AUTO: 29.1 PG (ref 26.6–33)
MCHC RBC AUTO-ENTMCNC: 34.1 G/DL (ref 31.5–35.7)
MCV RBC AUTO: 85.5 FL (ref 79–97)
MONOCYTES # BLD AUTO: 0.5 10*3/MM3 (ref 0.1–0.9)
MONOCYTES NFR BLD AUTO: 6.1 % (ref 5–12)
NEUTROPHILS NFR BLD AUTO: 5.5 10*3/MM3 (ref 1.7–7)
NEUTROPHILS NFR BLD AUTO: 70 % (ref 42.7–76)
NRBC BLD AUTO-RTO: 0.1 /100 WBC (ref 0–0.2)
PLATELET # BLD AUTO: 253 10*3/MM3 (ref 140–450)
PMV BLD AUTO: 10.2 FL (ref 6–12)
RBC # BLD AUTO: 4.86 10*6/MM3 (ref 3.77–5.28)
WBC NRBC COR # BLD: 7.8 10*3/MM3 (ref 3.4–10.8)

## 2023-10-17 PROCEDURE — 82565 ASSAY OF CREATININE: CPT | Performed by: NURSE PRACTITIONER

## 2023-10-17 PROCEDURE — 36592 COLLECT BLOOD FROM PICC: CPT

## 2023-10-17 PROCEDURE — 85025 COMPLETE CBC W/AUTO DIFF WBC: CPT | Performed by: NURSE PRACTITIONER

## 2023-10-17 RX ORDER — SODIUM CHLORIDE 0.9 % (FLUSH) 0.9 %
10 SYRINGE (ML) INJECTION AS NEEDED
OUTPATIENT
Start: 2023-10-17

## 2023-10-17 RX ORDER — SODIUM CHLORIDE 0.9 % (FLUSH) 0.9 %
20 SYRINGE (ML) INJECTION AS NEEDED
OUTPATIENT
Start: 2023-10-17

## 2023-10-17 RX ORDER — SODIUM CHLORIDE 0.9 % (FLUSH) 0.9 %
20 SYRINGE (ML) INJECTION AS NEEDED
Status: DISCONTINUED | OUTPATIENT
Start: 2023-10-17 | End: 2023-10-19 | Stop reason: HOSPADM

## 2023-10-17 RX ADMIN — Medication 20 ML: at 14:15

## 2023-10-17 NOTE — OUTREACH NOTE
Sepsis Week 1 Survey      Flowsheet Row Responses   [facility] patient discharged from? [facility]   Does the patient have one of the following disease processes/diagnoses(primary or secondary)? Sepsis   Week 1 attempt successful? Yes   Call end time 1611   Person spoke with today (if not patient) and relationship pt   Meds reviewed with patient/caregiver? Yes   Is the patient having any side effects they believe may be caused by any medication additions or changes? No   Does the patient have all medications related to this admission filled (includes all antibiotics, inhalers, nebulizers,steroids,etc.) Yes   Is the patient taking all medications as directed (includes completed medication regime)? Yes   Does the patient have a primary care provider?  Yes   Psychosocial issues? No   Did the patient receive a copy of their discharge instructions? Yes   Nursing interventions Reviewed instructions with patient   What is the patient's perception of their health status since discharge? Improving   Is the patient/caregiver able to teach back TIME? T emperature - higher or lower than normal, I nfection - may have signs and symptoms of an infection, M ental Decline - confused, sleepy, difficult to arouse, E xtremely Ill - severe pain, discomfort, shortness of breath   Is patient/caregiver able to teach back steps to recovery at home? Rest and regain strength   Is the patient/caregiver able to teach back signs and symptoms of worsening condition: Fever, Hyperthermia, Shortness of breath/rapid respiratory rate, Altered mental status(confusion/coma)   If the patient is a current smoker, are they able to teach back resources for cessation? Not a smoker   Is the patient/caregiver able to teach back the hierarchy of who to call/visit for symptoms/problems? PCP, Specialist, Home health nurse, Urgent Care, ED, 911 Yes   Week 1 call completed? Yes   Is the patient interested in additional calls from an ambulatory ? No    Would this patient benefit from a Referral to [facility] Social Work? 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 Pt confimrs knowing the s/s of sepsis, and knows when to call 911/go to ER. Pt is receiving IVPB ampicillin. Pt has seen infectious disease, and will fu with PCP.   Call end time 1611            Iesha CABRERA - Registered Nurse

## 2023-10-22 ENCOUNTER — HOSPITAL ENCOUNTER (OUTPATIENT)
Dept: INFUSION THERAPY | Facility: HOSPITAL | Age: 53
Discharge: HOME | End: 2023-10-22
Admitting: NURSE PRACTITIONER
Payer: COMMERCIAL

## 2023-10-22 DIAGNOSIS — R78.81 BACTEREMIA: Primary | ICD-10-CM

## 2023-10-22 LAB
BASOPHILS # BLD AUTO: 0.1 10*3/MM3 (ref 0–0.2)
BASOPHILS NFR BLD AUTO: 2.2 % (ref 0–1.5)
CREAT SERPL-MCNC: 0.74 MG/DL (ref 0.57–1)
DEPRECATED RDW RBC AUTO: 43.3 FL (ref 37–54)
EGFRCR SERPLBLD CKD-EPI 2021: 95.7 ML/MIN/1.73
EOSINOPHIL # BLD AUTO: 0.1 10*3/MM3 (ref 0–0.4)
EOSINOPHIL NFR BLD AUTO: 1.1 % (ref 0.3–6.2)
ERYTHROCYTE [DISTWIDTH] IN BLOOD BY AUTOMATED COUNT: 13.6 % (ref 12.3–15.4)
HCT VFR BLD AUTO: 41.3 % (ref 34–46.6)
HGB BLD-MCNC: 13.8 G/DL (ref 12–15.9)
LYMPHOCYTES # BLD AUTO: 1.2 10*3/MM3 (ref 0.7–3.1)
LYMPHOCYTES NFR BLD AUTO: 19.4 % (ref 19.6–45.3)
MCH RBC QN AUTO: 28.7 PG (ref 26.6–33)
MCHC RBC AUTO-ENTMCNC: 33.6 G/DL (ref 31.5–35.7)
MCV RBC AUTO: 85.4 FL (ref 79–97)
MONOCYTES # BLD AUTO: 0.5 10*3/MM3 (ref 0.1–0.9)
MONOCYTES NFR BLD AUTO: 7.4 % (ref 5–12)
NEUTROPHILS NFR BLD AUTO: 4.5 10*3/MM3 (ref 1.7–7)
NEUTROPHILS NFR BLD AUTO: 69.9 % (ref 42.7–76)
NRBC BLD AUTO-RTO: 0 /100 WBC (ref 0–0.2)
PLATELET # BLD AUTO: 263 10*3/MM3 (ref 140–450)
PMV BLD AUTO: 10.3 FL (ref 6–12)
RBC # BLD AUTO: 4.83 10*6/MM3 (ref 3.77–5.28)
WBC NRBC COR # BLD: 6.4 10*3/MM3 (ref 3.4–10.8)

## 2023-10-22 PROCEDURE — 85025 COMPLETE CBC W/AUTO DIFF WBC: CPT | Performed by: NURSE PRACTITIONER

## 2023-10-22 PROCEDURE — 82565 ASSAY OF CREATININE: CPT | Performed by: NURSE PRACTITIONER

## 2023-10-22 PROCEDURE — 36592 COLLECT BLOOD FROM PICC: CPT

## 2023-10-24 ENCOUNTER — READMISSION MANAGEMENT (OUTPATIENT)
Dept: CALL CENTER | Facility: HOSPITAL | Age: 53
End: 2023-10-24
Payer: COMMERCIAL

## 2023-10-24 NOTE — OUTREACH NOTE
Sepsis Week 2 Survey      Flowsheet Row Responses   [facility] facility patient discharged from? [facility]   Does the patient have one of the following disease processes/diagnoses(primary or secondary)? Sepsis   Week 2 attempt successful? No   Unsuccessful attempts Attempt 1            David ELMORE - Registered Nurse

## 2023-10-26 ENCOUNTER — READMISSION MANAGEMENT (OUTPATIENT)
Dept: CALL CENTER | Facility: HOSPITAL | Age: 53
End: 2023-10-26
Payer: COMMERCIAL

## 2023-10-26 NOTE — OUTREACH NOTE
Sepsis Week 2 Survey      Flowsheet Row Responses   [facility] facility patient discharged from? [facility]   Does the patient have one of the following disease processes/diagnoses(primary or secondary)? Sepsis   Week 2 attempt successful? No   Unsuccessful attempts Attempt 2            Katie ANDERSEN - Registered Nurse

## 2023-11-01 ENCOUNTER — HOSPITAL ENCOUNTER (OUTPATIENT)
Dept: MAMMOGRAPHY | Facility: HOSPITAL | Age: 53
Discharge: HOME | End: 2023-11-01
Payer: COMMERCIAL

## 2023-11-01 DIAGNOSIS — Z12.31 ENCOUNTER FOR SCREENING MAMMOGRAM FOR MALIGNANT NEOPLASM OF BREAST: ICD-10-CM

## 2023-11-01 PROCEDURE — 77063 BREAST TOMOSYNTHESIS BI: CPT

## 2023-11-01 PROCEDURE — 77067 SCR MAMMO BI INCL CAD: CPT

## 2024-04-15 RX ORDER — BISOPROLOL FUMARATE AND HYDROCHLOROTHIAZIDE 10; 6.25 MG/1; MG/1
1 TABLET ORAL DAILY
Qty: 30 TABLET | Refills: 0 | Status: CANCELLED | OUTPATIENT
Start: 2024-04-11

## 2024-04-17 RX ORDER — BISOPROLOL FUMARATE AND HYDROCHLOROTHIAZIDE 10; 6.25 MG/1; MG/1
1 TABLET ORAL DAILY
Qty: 30 TABLET | Refills: 0 | Status: CANCELLED | OUTPATIENT
Start: 2024-04-11

## 2024-04-22 RX ORDER — BISOPROLOL FUMARATE AND HYDROCHLOROTHIAZIDE 10; 6.25 MG/1; MG/1
1 TABLET ORAL DAILY
Qty: 30 TABLET | Refills: 0 | Status: CANCELLED | OUTPATIENT
Start: 2024-04-11

## 2024-04-24 RX ORDER — BISOPROLOL FUMARATE AND HYDROCHLOROTHIAZIDE 10; 6.25 MG/1; MG/1
1 TABLET ORAL DAILY
Qty: 30 TABLET | Refills: 0 | Status: CANCELLED | OUTPATIENT
Start: 2024-04-11

## 2024-09-17 ENCOUNTER — OFFICE VISIT (OUTPATIENT)
Dept: FAMILY MEDICINE CLINIC | Facility: CLINIC | Age: 54
End: 2024-09-17
Payer: COMMERCIAL

## 2024-09-17 ENCOUNTER — PATIENT ROUNDING (BHMG ONLY) (OUTPATIENT)
Dept: FAMILY MEDICINE CLINIC | Facility: CLINIC | Age: 54
End: 2024-09-17
Payer: COMMERCIAL

## 2024-09-17 VITALS
SYSTOLIC BLOOD PRESSURE: 140 MMHG | RESPIRATION RATE: 18 BRPM | HEART RATE: 79 BPM | HEIGHT: 63 IN | TEMPERATURE: 98.1 F | BODY MASS INDEX: 45.82 KG/M2 | DIASTOLIC BLOOD PRESSURE: 100 MMHG | WEIGHT: 258.6 LBS | OXYGEN SATURATION: 97 %

## 2024-09-17 DIAGNOSIS — Z13.29 SCREENING FOR THYROID DISORDER: ICD-10-CM

## 2024-09-17 DIAGNOSIS — R74.8 ELEVATED LIVER ENZYMES: ICD-10-CM

## 2024-09-17 DIAGNOSIS — Z13.1 SCREENING FOR DIABETES MELLITUS: ICD-10-CM

## 2024-09-17 DIAGNOSIS — Z13.220 SCREENING FOR LIPID DISORDERS: ICD-10-CM

## 2024-09-17 DIAGNOSIS — Z76.89 ENCOUNTER TO ESTABLISH CARE: ICD-10-CM

## 2024-09-17 DIAGNOSIS — Z23 NEED FOR TDAP VACCINATION: ICD-10-CM

## 2024-09-17 DIAGNOSIS — Z12.11 SCREENING FOR MALIGNANT NEOPLASM OF COLON: ICD-10-CM

## 2024-09-17 DIAGNOSIS — I10 PRIMARY HYPERTENSION: Primary | ICD-10-CM

## 2024-09-17 DIAGNOSIS — Z13.29 SCREENING FOR ENDOCRINE, METABOLIC AND IMMUNITY DISORDER: ICD-10-CM

## 2024-09-17 DIAGNOSIS — Z13.0 SCREENING FOR ENDOCRINE, METABOLIC AND IMMUNITY DISORDER: ICD-10-CM

## 2024-09-17 DIAGNOSIS — Z13.228 SCREENING FOR ENDOCRINE, METABOLIC AND IMMUNITY DISORDER: ICD-10-CM

## 2024-09-17 PROCEDURE — 80061 LIPID PANEL: CPT | Performed by: REGISTERED NURSE

## 2024-09-17 PROCEDURE — 99214 OFFICE O/P EST MOD 30 MIN: CPT | Performed by: REGISTERED NURSE

## 2024-09-17 PROCEDURE — 90715 TDAP VACCINE 7 YRS/> IM: CPT | Performed by: REGISTERED NURSE

## 2024-09-17 PROCEDURE — 80050 GENERAL HEALTH PANEL: CPT | Performed by: REGISTERED NURSE

## 2024-09-17 PROCEDURE — 90471 IMMUNIZATION ADMIN: CPT | Performed by: REGISTERED NURSE

## 2024-09-17 PROCEDURE — 83036 HEMOGLOBIN GLYCOSYLATED A1C: CPT | Performed by: REGISTERED NURSE

## 2024-09-17 RX ORDER — BISOPROLOL FUMARATE AND HYDROCHLOROTHIAZIDE 10; 6.25 MG/1; MG/1
1 TABLET ORAL DAILY
Qty: 90 TABLET | Refills: 1 | Status: SHIPPED | OUTPATIENT
Start: 2024-09-17

## 2024-09-18 LAB
ALBUMIN SERPL-MCNC: 4.1 G/DL (ref 3.5–5.2)
ALBUMIN/GLOB SERPL: 1.3 G/DL
ALP SERPL-CCNC: 264 U/L (ref 39–117)
ALT SERPL W P-5'-P-CCNC: 58 U/L (ref 1–33)
ANION GAP SERPL CALCULATED.3IONS-SCNC: 10.8 MMOL/L (ref 5–15)
AST SERPL-CCNC: 54 U/L (ref 1–32)
BASOPHILS # BLD AUTO: 0.07 10*3/MM3 (ref 0–0.2)
BASOPHILS NFR BLD AUTO: 1.2 % (ref 0–1.5)
BILIRUB SERPL-MCNC: 0.7 MG/DL (ref 0–1.2)
BUN SERPL-MCNC: 14 MG/DL (ref 6–20)
BUN/CREAT SERPL: 18.4 (ref 7–25)
CALCIUM SPEC-SCNC: 10.1 MG/DL (ref 8.6–10.5)
CHLORIDE SERPL-SCNC: 107 MMOL/L (ref 98–107)
CHOLEST SERPL-MCNC: 199 MG/DL (ref 0–200)
CO2 SERPL-SCNC: 23.2 MMOL/L (ref 22–29)
CREAT SERPL-MCNC: 0.76 MG/DL (ref 0.57–1)
DEPRECATED RDW RBC AUTO: 37.9 FL (ref 37–54)
EGFRCR SERPLBLD CKD-EPI 2021: 92.1 ML/MIN/1.73
EOSINOPHIL # BLD AUTO: 0.08 10*3/MM3 (ref 0–0.4)
EOSINOPHIL NFR BLD AUTO: 1.4 % (ref 0.3–6.2)
ERYTHROCYTE [DISTWIDTH] IN BLOOD BY AUTOMATED COUNT: 12.1 % (ref 12.3–15.4)
GLOBULIN UR ELPH-MCNC: 3.1 GM/DL
GLUCOSE SERPL-MCNC: 102 MG/DL (ref 65–99)
HBA1C MFR BLD: 5.2 % (ref 4.8–5.6)
HCT VFR BLD AUTO: 47.4 % (ref 34–46.6)
HDLC SERPL-MCNC: 58 MG/DL (ref 40–60)
HGB BLD-MCNC: 15.9 G/DL (ref 12–15.9)
IMM GRANULOCYTES # BLD AUTO: 0.02 10*3/MM3 (ref 0–0.05)
IMM GRANULOCYTES NFR BLD AUTO: 0.3 % (ref 0–0.5)
LDLC SERPL CALC-MCNC: 129 MG/DL (ref 0–100)
LDLC/HDLC SERPL: 2.2 {RATIO}
LYMPHOCYTES # BLD AUTO: 1.32 10*3/MM3 (ref 0.7–3.1)
LYMPHOCYTES NFR BLD AUTO: 22.7 % (ref 19.6–45.3)
MCH RBC QN AUTO: 29.1 PG (ref 26.6–33)
MCHC RBC AUTO-ENTMCNC: 33.5 G/DL (ref 31.5–35.7)
MCV RBC AUTO: 86.8 FL (ref 79–97)
MONOCYTES # BLD AUTO: 0.33 10*3/MM3 (ref 0.1–0.9)
MONOCYTES NFR BLD AUTO: 5.7 % (ref 5–12)
NEUTROPHILS NFR BLD AUTO: 3.99 10*3/MM3 (ref 1.7–7)
NEUTROPHILS NFR BLD AUTO: 68.7 % (ref 42.7–76)
NRBC BLD AUTO-RTO: 0 /100 WBC (ref 0–0.2)
PLATELET # BLD AUTO: 222 10*3/MM3 (ref 140–450)
PMV BLD AUTO: 11.7 FL (ref 6–12)
POTASSIUM SERPL-SCNC: 4.2 MMOL/L (ref 3.5–5.2)
PROT SERPL-MCNC: 7.2 G/DL (ref 6–8.5)
RBC # BLD AUTO: 5.46 10*6/MM3 (ref 3.77–5.28)
SODIUM SERPL-SCNC: 141 MMOL/L (ref 136–145)
TRIGL SERPL-MCNC: 66 MG/DL (ref 0–150)
TSH SERPL DL<=0.05 MIU/L-ACNC: 0.91 UIU/ML (ref 0.27–4.2)
VLDLC SERPL-MCNC: 12 MG/DL (ref 5–40)
WBC NRBC COR # BLD AUTO: 5.81 10*3/MM3 (ref 3.4–10.8)

## 2024-09-30 ENCOUNTER — OFFICE (OUTPATIENT)
Age: 54
End: 2024-09-30

## 2024-09-30 ENCOUNTER — OFFICE (OUTPATIENT)
Dept: URBAN - METROPOLITAN AREA CLINIC 64 | Facility: CLINIC | Age: 54
End: 2024-09-30

## 2024-09-30 VITALS
WEIGHT: 252 LBS | SYSTOLIC BLOOD PRESSURE: 117 MMHG | SYSTOLIC BLOOD PRESSURE: 117 MMHG | DIASTOLIC BLOOD PRESSURE: 77 MMHG | WEIGHT: 252 LBS | HEART RATE: 80 BPM | HEIGHT: 63 IN | DIASTOLIC BLOOD PRESSURE: 77 MMHG | DIASTOLIC BLOOD PRESSURE: 77 MMHG | HEART RATE: 80 BPM | HEIGHT: 63 IN | HEIGHT: 63 IN | DIASTOLIC BLOOD PRESSURE: 102 MMHG | WEIGHT: 252 LBS | WEIGHT: 252 LBS | WEIGHT: 252 LBS | SYSTOLIC BLOOD PRESSURE: 139 MMHG | SYSTOLIC BLOOD PRESSURE: 139 MMHG | DIASTOLIC BLOOD PRESSURE: 102 MMHG | DIASTOLIC BLOOD PRESSURE: 77 MMHG | HEART RATE: 80 BPM | SYSTOLIC BLOOD PRESSURE: 139 MMHG | DIASTOLIC BLOOD PRESSURE: 102 MMHG | SYSTOLIC BLOOD PRESSURE: 139 MMHG | WEIGHT: 252 LBS | DIASTOLIC BLOOD PRESSURE: 102 MMHG | DIASTOLIC BLOOD PRESSURE: 102 MMHG | HEIGHT: 63 IN | SYSTOLIC BLOOD PRESSURE: 117 MMHG | DIASTOLIC BLOOD PRESSURE: 77 MMHG | HEART RATE: 80 BPM | HEIGHT: 63 IN | HEART RATE: 80 BPM | WEIGHT: 252 LBS | SYSTOLIC BLOOD PRESSURE: 139 MMHG | SYSTOLIC BLOOD PRESSURE: 117 MMHG | SYSTOLIC BLOOD PRESSURE: 117 MMHG | SYSTOLIC BLOOD PRESSURE: 117 MMHG | HEIGHT: 63 IN | DIASTOLIC BLOOD PRESSURE: 77 MMHG | SYSTOLIC BLOOD PRESSURE: 139 MMHG | DIASTOLIC BLOOD PRESSURE: 102 MMHG | HEART RATE: 80 BPM | SYSTOLIC BLOOD PRESSURE: 117 MMHG | DIASTOLIC BLOOD PRESSURE: 77 MMHG | DIASTOLIC BLOOD PRESSURE: 102 MMHG | SYSTOLIC BLOOD PRESSURE: 139 MMHG | HEART RATE: 80 BPM | HEIGHT: 63 IN

## 2024-09-30 DIAGNOSIS — R94.5 ABNORMAL RESULTS OF LIVER FUNCTION STUDIES: ICD-10-CM

## 2024-09-30 DIAGNOSIS — E66.01 MORBID (SEVERE) OBESITY DUE TO EXCESS CALORIES: ICD-10-CM

## 2024-09-30 PROCEDURE — 99213 OFFICE O/P EST LOW 20 MIN: CPT | Performed by: NURSE PRACTITIONER

## 2024-10-02 ENCOUNTER — TRANSCRIBE ORDERS (OUTPATIENT)
Dept: ADMINISTRATIVE | Facility: HOSPITAL | Age: 54
End: 2024-10-02
Payer: COMMERCIAL

## 2024-10-02 DIAGNOSIS — R74.8 ABNORMAL LIVER ENZYMES: Primary | ICD-10-CM

## 2024-10-02 DIAGNOSIS — E66.01 MORBID OBESITY: ICD-10-CM

## 2024-10-08 ENCOUNTER — HOSPITAL ENCOUNTER (OUTPATIENT)
Dept: ULTRASOUND IMAGING | Facility: HOSPITAL | Age: 54
Discharge: HOME | End: 2024-10-08
Admitting: NURSE PRACTITIONER
Payer: COMMERCIAL

## 2024-10-08 DIAGNOSIS — R74.8 ABNORMAL LIVER ENZYMES: ICD-10-CM

## 2024-10-08 DIAGNOSIS — E66.01 MORBID OBESITY: ICD-10-CM

## 2024-10-08 PROCEDURE — 76705 ECHO EXAM OF ABDOMEN: CPT

## 2024-10-09 ENCOUNTER — TELEPHONE (OUTPATIENT)
Dept: FAMILY MEDICINE CLINIC | Facility: CLINIC | Age: 54
End: 2024-10-09

## 2024-10-09 NOTE — TELEPHONE ENCOUNTER
Caller: Idalia Oliver    Relationship: Self    Best call back number: [phone]    Caller requesting test results: PATIENT    What test was performed: BLOOD WORK     When was the test performed: 09/30/24    Where was the test performed: [facility]     Additional notes:

## 2024-10-14 ENCOUNTER — OFFICE VISIT (OUTPATIENT)
Dept: FAMILY MEDICINE CLINIC | Facility: CLINIC | Age: 54
End: 2024-10-14
Payer: COMMERCIAL

## 2024-10-14 VITALS
HEART RATE: 84 BPM | RESPIRATION RATE: 18 BRPM | HEIGHT: 63 IN | OXYGEN SATURATION: 97 % | DIASTOLIC BLOOD PRESSURE: 87 MMHG | BODY MASS INDEX: 44.76 KG/M2 | WEIGHT: 252.6 LBS | SYSTOLIC BLOOD PRESSURE: 145 MMHG | TEMPERATURE: 98.2 F

## 2024-10-14 DIAGNOSIS — R74.8 ELEVATED LIVER ENZYMES: Primary | ICD-10-CM

## 2024-10-14 DIAGNOSIS — R76.8 ELEVATED ANTINUCLEAR ANTIBODY (ANA) LEVEL: ICD-10-CM

## 2024-10-14 PROCEDURE — 99213 OFFICE O/P EST LOW 20 MIN: CPT | Performed by: REGISTERED NURSE

## 2024-10-14 NOTE — PROGRESS NOTES
Chief Complaint  Follow-up (Patient is here to follow up on lab results)    Subjective    History of Present Illness {CC  Problem List  Visit  Diagnosis   Encounters  Notes  Medications  Labs  Result Review Imaging  Media :23}     Idalia Oliver presents to [facility] for Follow-up (Patient is here to follow up on lab results).      History of Present Illness  Patient is a 54 y.o. female who presents to the clinic today for 1 month follow-up for elevated liver enzymes and elevated ALYSHA.  Patient denies any chest pain, shortness of breath, or any fevers.  Patient denies any known exposure to COVID, flu, or any other contagious illnesses.    In regards to elevated liver enzymes, patient was referred to gastroenterology at her request to further investigate the reason why her liver enzymes were elevated.  While at her gastroenterologist visit they shakeel labs for her.  She had an elevated ALYSHA at [facility].  Discussed this today and she wants to further pursue this since her liver enzymes are elevated and her ALYSHA is positive.  We discussed rheumatology as a potential option to further investigate autoimmune concerns.  I have requested a copy of labs from [facility] for the chart and will place the order referral for rheumatology.  Patient prefers to stay within [facility] when possible.       Review of Systems   Constitutional: Negative.  Negative for activity change, chills, fatigue and fever.   HENT: Negative.  Negative for congestion, dental problem, ear pain, hearing loss, rhinorrhea, sinus pain, sore throat, tinnitus and trouble swallowing.    Eyes: Negative.  Negative for pain and visual disturbance.   Respiratory: Negative.  Negative for cough, chest tightness, shortness of breath and wheezing.    Cardiovascular: Negative.  Negative for chest pain, palpitations and leg swelling.   Gastrointestinal: Negative.  Negative for abdominal pain, diarrhea, nausea and vomiting.   Endocrine: Negative.   "Negative for polydipsia, polyphagia and polyuria.   Genitourinary: Negative.  Negative for difficulty urinating, dysuria, frequency and urgency.   Musculoskeletal: Negative.  Negative for arthralgias, back pain and myalgias.   Skin: Negative.  Negative for color change, pallor, rash and wound.   Allergic/Immunologic: Negative.  Negative for environmental allergies.   Neurological: Negative.  Negative for dizziness, speech difficulty, weakness, light-headedness, numbness and headaches.   Hematological: Negative.    Psychiatric/Behavioral: Negative.  Negative for confusion, decreased concentration, self-injury and suicidal ideas. The patient is not nervous/anxious.    All other systems reviewed and are negative.       Objective     Vital Signs:   /87 (BP Location: Left arm, Patient Position: Sitting, Cuff Size: Large Adult)   Pulse 84   Temp 98.2 °F (36.8 °C) (Temporal)   Resp 18   Ht 160 cm (63\")   Wt 115 kg (252 lb 9.6 oz)   SpO2 97%   BMI 44.75 kg/m²   Current Outpatient Medications on File Prior to Visit   Medication Sig Dispense Refill    bisoprolol-hydrochlorothiazide (ZIAC) 10-6.25 MG per tablet take 1 tablet by oral route  every day 90 tablet 1    Cholecalciferol (VITAMIN D3 PO) Take 2,000 Units by mouth Daily.       No current facility-administered medications on file prior to visit.        Past Medical History:   Diagnosis Date    Allergic     Hypertension     Obesity       Past Surgical History:   Procedure Laterality Date    CHOLECYSTECTOMY      DENTAL PROCEDURE      ERCP N/A 10/6/2023    Procedure: ENDOSCOPIC RETROGRADE CHOLANGIOPANCREATOGRAPHYWITH BALLOON CLEAREANCE (12MM - 15MM BALLOON) UP TO 15MM OF BILE DUCT;  Surgeon: Valentin Campos MD;  Location: [facility] ENDOS[facility];  Service: Gastroenterology;  Laterality: N/A;  CHOLANGITIS      Family History   Problem Relation Age of Onset    Hypertension Mother     Hypertension Father     Heart disease Father     Stroke Father     Cancer " Father         Prostate    Breast cancer Sister 47    Cancer Sister         Breast      Social History     Socioeconomic History    Marital status:    Tobacco Use    Smoking status: Never     Passive exposure: Never    Smokeless tobacco: Never   Vaping Use    Vaping status: Never Used   Substance and Sexual Activity    Alcohol use: Not Currently    Drug use: Never    Sexual activity: Defer         Office Visit on 09/17/2024   Component Date Value Ref Range Status    Glucose 09/17/2024 102 (H)  65 - 99 mg/dL Final    BUN 09/17/2024 14  6 - 20 mg/dL Final    Creatinine 09/17/2024 0.76  0.57 - 1.00 mg/dL Final    Sodium 09/17/2024 141  136 - 145 mmol/L Final    Potassium 09/17/2024 4.2  3.5 - 5.2 mmol/L Final    Chloride 09/17/2024 107  98 - 107 mmol/L Final    CO2 09/17/2024 23.2  22.0 - 29.0 mmol/L Final    Calcium 09/17/2024 10.1  8.6 - 10.5 mg/dL Final    Total Protein 09/17/2024 7.2  6.0 - 8.5 g/dL Final    Albumin 09/17/2024 4.1  3.5 - 5.2 g/dL Final    ALT (SGPT) 09/17/2024 58 (H)  1 - 33 U/L Final    AST (SGOT) 09/17/2024 54 (H)  1 - 32 U/L Final    Alkaline Phosphatase 09/17/2024 264 (H)  39 - 117 U/L Final    Total Bilirubin 09/17/2024 0.7  0.0 - 1.2 mg/dL Final    Globulin 09/17/2024 3.1  gm/dL Final    A/G Ratio 09/17/2024 1.3  g/dL Final    BUN/Creatinine Ratio 09/17/2024 18.4  7.0 - 25.0 Final    Anion Gap 09/17/2024 10.8  5.0 - 15.0 mmol/L Final    eGFR 09/17/2024 92.1  >60.0 mL/min/1.73 Final    Hemoglobin A1C 09/17/2024 5.20  4.80 - 5.60 % Final    Total Cholesterol 09/17/2024 199  0 - 200 mg/dL Final    Triglycerides 09/17/2024 66  0 - 150 mg/dL Final    HDL Cholesterol 09/17/2024 58  40 - 60 mg/dL Final    LDL Cholesterol  09/17/2024 129 (H)  0 - 100 mg/dL Final    VLDL Cholesterol 09/17/2024 12  5 - 40 mg/dL Final    LDL/HDL Ratio 09/17/2024 2.20   Final    TSH 09/17/2024 0.905  0.270 - 4.200 uIU/mL Final    WBC 09/17/2024 5.81  3.40 - 10.80 10*3/mm3 Final    RBC 09/17/2024 5.46 (H)  3.77 -  5.28 10*6/mm3 Final    Hemoglobin 09/17/2024 15.9  12.0 - 15.9 g/dL Final    Hematocrit 09/17/2024 47.4 (H)  34.0 - 46.6 % Final    MCV 09/17/2024 86.8  79.0 - 97.0 fL Final    MCH 09/17/2024 29.1  26.6 - 33.0 pg Final    MCHC 09/17/2024 33.5  31.5 - 35.7 g/dL Final    RDW 09/17/2024 12.1 (L)  12.3 - 15.4 % Final    RDW-SD 09/17/2024 37.9  37.0 - 54.0 fl Final    MPV 09/17/2024 11.7  6.0 - 12.0 fL Final    Platelets 09/17/2024 222  140 - 450 10*3/mm3 Final    Neutrophil % 09/17/2024 68.7  42.7 - 76.0 % Final    Lymphocyte % 09/17/2024 22.7  19.6 - 45.3 % Final    Monocyte % 09/17/2024 5.7  5.0 - 12.0 % Final    Eosinophil % 09/17/2024 1.4  0.3 - 6.2 % Final    Basophil % 09/17/2024 1.2  0.0 - 1.5 % Final    Immature Grans % 09/17/2024 0.3  0.0 - 0.5 % Final    Neutrophils, Absolute 09/17/2024 3.99  1.70 - 7.00 10*3/mm3 Final    Lymphocytes, Absolute 09/17/2024 1.32  0.70 - 3.10 10*3/mm3 Final    Monocytes, Absolute 09/17/2024 0.33  0.10 - 0.90 10*3/mm3 Final    Eosinophils, Absolute 09/17/2024 0.08  0.00 - 0.40 10*3/mm3 Final    Basophils, Absolute 09/17/2024 0.07  0.00 - 0.20 10*3/mm3 Final    Immature Grans, Absolute 09/17/2024 0.02  0.00 - 0.05 10*3/mm3 Final    nRBC 09/17/2024 0.0  0.0 - 0.2 /100 WBC Final         Physical Exam  Vitals and nursing note reviewed.   Constitutional:       Appearance: Normal appearance. She is normal weight.   HENT:      Head: Normocephalic and atraumatic.   Cardiovascular:      Rate and Rhythm: Normal rate and regular rhythm.      Pulses: Normal pulses.      Heart sounds: Normal heart sounds. No murmur heard.     No friction rub. No gallop.   Pulmonary:      Effort: Pulmonary effort is normal. No respiratory distress.      Breath sounds: Normal breath sounds. No stridor. No wheezing, rhonchi or rales.   Chest:      Chest wall: No tenderness.   Abdominal:      General: Abdomen is flat. Bowel sounds are normal. There is no distension.      Palpations: Abdomen is soft. There is no  mass.      Tenderness: There is no abdominal tenderness. There is no right CVA tenderness, left CVA tenderness, guarding or rebound.      Hernia: No hernia is present.   Skin:     General: Skin is warm and dry.      Capillary Refill: Capillary refill takes less than 2 seconds.      Coloration: Skin is not jaundiced or pale.   Neurological:      General: No focal deficit present.      Mental Status: She is alert and oriented to person, place, and time. Mental status is at baseline.      Motor: No weakness.      Coordination: Coordination normal.      Gait: Gait normal.   Psychiatric:         Mood and Affect: Mood normal.         Behavior: Behavior normal.         Thought Content: Thought content normal.         Judgment: Judgment normal.          Result Review  Data Reviewed:{ Labs  Result Review  Imaging  Med Tab  Media :23}   I have reviewed this patient's chart.  I have reviewed previous labs, previous imaging, previous medications, and previous encounters with notes that were available in this patient's chart.               Assessment and Plan {CC Problem List  Visit Diagnosis  ROS  Review (Popup)  [facility] Maintenance  Quality  BestPractice  Medications  SmartSets  SnapShot Encounters  Media :23}   Diagnoses and all orders for this visit:    1. Elevated liver enzymes (Primary)    2. Elevated antinuclear antibody (ALYSHA) level  -     Ambulatory Referral to Rheumatology        -Referral to rheumatology for positive ALYSHA and elevated liver enzymes to discuss autoimmune and other potential possibilities.  -Requested records from [facility] with their lab results.  -ER red flags discussed with patient including risk versus benefit and education provided.  -Follow-up with me in 3 months or sooner if needed.    I spent 20 minutes caring for Idalia on this date of service. This time includes time spent by me in the following activities:preparing for the visit, reviewing tests, obtaining and/or reviewing a separately  obtained history, performing a medically appropriate examination and/or evaluation , counseling and educating the patient/family/caregiver, ordering medications, tests, or procedures, referring and communicating with other health care professionals , documenting information in the medical record, independently interpreting results and communicating that information with the patient/family/caregiver, and care coordination.    Follow Up {Instructions Charge Capture  Follow-up Communications :23}     Patient was given instructions and counseling regarding her condition or for health maintenance advice. Please see specific information pulled into the AVS (placed there by myself) if appropriate.    Return in about 3 months (around 1/14/2025) for routine follow up.    Class 3 Severe Obesity (BMI >=40). Obesity-related health conditions include the following: none. Obesity is unchanged. BMI is is above average; BMI management plan is completed. We discussed portion control and increasing exercise.       SANYA Kasper, FNP-BC

## 2024-10-28 ENCOUNTER — PREP FOR SURGERY (OUTPATIENT)
Dept: OTHER | Facility: HOSPITAL | Age: 54
End: 2024-10-28
Payer: COMMERCIAL

## 2024-10-28 DIAGNOSIS — Z12.11 ENCOUNTER FOR SCREENING COLONOSCOPY: Primary | ICD-10-CM

## 2024-10-29 RX ORDER — SODIUM, POTASSIUM,MAG SULFATES 17.5-3.13G
SOLUTION, RECONSTITUTED, ORAL ORAL
Qty: 354 ML | Refills: 0 | Status: SHIPPED | OUTPATIENT
Start: 2024-10-29

## 2024-10-30 ENCOUNTER — TRANSCRIBE ORDERS (OUTPATIENT)
Dept: ADMINISTRATIVE | Facility: HOSPITAL | Age: 54
End: 2024-10-30
Payer: COMMERCIAL

## 2024-10-30 DIAGNOSIS — Z12.31 VISIT FOR SCREENING MAMMOGRAM: Primary | ICD-10-CM

## 2024-11-05 ENCOUNTER — HOSPITAL ENCOUNTER (OUTPATIENT)
Dept: MAMMOGRAPHY | Facility: HOSPITAL | Age: 54
Discharge: HOME | End: 2024-11-05
Admitting: REGISTERED NURSE
Payer: COMMERCIAL

## 2024-11-05 DIAGNOSIS — Z12.31 VISIT FOR SCREENING MAMMOGRAM: ICD-10-CM

## 2024-11-05 PROCEDURE — 77063 BREAST TOMOSYNTHESIS BI: CPT

## 2024-11-05 PROCEDURE — 77067 SCR MAMMO BI INCL CAD: CPT

## 2024-12-18 ENCOUNTER — OFFICE VISIT (OUTPATIENT)
Dept: FAMILY MEDICINE CLINIC | Facility: CLINIC | Age: 54
End: 2024-12-18
Payer: COMMERCIAL

## 2024-12-18 VITALS
OXYGEN SATURATION: 100 % | DIASTOLIC BLOOD PRESSURE: 84 MMHG | RESPIRATION RATE: 18 BRPM | HEIGHT: 63 IN | HEART RATE: 75 BPM | SYSTOLIC BLOOD PRESSURE: 133 MMHG | WEIGHT: 247.8 LBS | BODY MASS INDEX: 43.91 KG/M2

## 2024-12-18 DIAGNOSIS — I10 PRIMARY HYPERTENSION: Primary | ICD-10-CM

## 2024-12-18 DIAGNOSIS — E78.2 MIXED HYPERLIPIDEMIA: ICD-10-CM

## 2024-12-18 DIAGNOSIS — R74.8 ELEVATED LIVER ENZYMES: ICD-10-CM

## 2024-12-18 PROCEDURE — 99214 OFFICE O/P EST MOD 30 MIN: CPT | Performed by: REGISTERED NURSE

## 2024-12-18 PROCEDURE — 80061 LIPID PANEL: CPT | Performed by: REGISTERED NURSE

## 2024-12-18 PROCEDURE — 80053 COMPREHEN METABOLIC PANEL: CPT | Performed by: REGISTERED NURSE

## 2024-12-18 PROCEDURE — 85025 COMPLETE CBC W/AUTO DIFF WBC: CPT | Performed by: REGISTERED NURSE

## 2024-12-18 NOTE — PROGRESS NOTES
Chief Complaint  Primary Care Follow-Up and Hypertension    Subjective    History of Present Illness {CC  Problem List  Visit  Diagnosis   Encounters  Notes  Medications  Labs  Result Review Imaging  Media :23}     Idalia Oliver presents to [facility] for Primary Care Follow-Up and Hypertension.      History of Present Illness  Patient is a 54 y.o. female who presents to the clinic today for 3-month follow-up for hypertension and hyperlipidemia.  Patient denies any chest pain, shortness of breath, or any fevers.  Patient denies any known exposure to COVID, flu, or any other contagious illnesses.       History of Present Illness  In regards to hypertension, patient's blood pressure today is 133/84.  She is currently taking bisoprolol with hydrochlorothiazide for management of this.  She has been adhering to her prescribed medication regimen without any lapses. She reports that her blood pressure readings at home are significantly lower than the one recorded in the clinic today, citing that her blood pressures around 120/70 at home. Additionally, she monitors her blood pressure while at work, which also remains within the normal range. She does not require any refills of her current medications at this time.    In regards to hyperlipidemia, patient is currently controlling this with dietary modifications.  She is currently fasting for labs she shares today.  She has been making efforts to reduce her dietary fat intake since her last visit, although progress has been gradual.  At this time statin medications are not warranted as her ASCVD risk looks at only 3% for the overall next 5 to 10 years for potential cardiovascular events.  I will include this screening below.    Supplemental Information  She will be doing the colonoscopy in January. She has a rheumatology appointment in February.    MEDICATIONS  bisoprolol with hydrochlorothiazide       Review of Systems  "  Constitutional: Negative.  Negative for activity change, chills, fatigue and fever.   HENT: Negative.  Negative for congestion, dental problem, ear pain, hearing loss, rhinorrhea, sinus pain, sore throat, tinnitus and trouble swallowing.    Eyes: Negative.  Negative for pain and visual disturbance.   Respiratory: Negative.  Negative for cough, chest tightness, shortness of breath and wheezing.    Cardiovascular: Negative.  Negative for chest pain, palpitations and leg swelling.   Gastrointestinal: Negative.  Negative for abdominal pain, diarrhea, nausea and vomiting.   Endocrine: Negative.  Negative for polydipsia, polyphagia and polyuria.   Genitourinary: Negative.  Negative for difficulty urinating, dysuria, frequency and urgency.   Musculoskeletal: Negative.  Negative for arthralgias, back pain and myalgias.   Skin: Negative.  Negative for color change, pallor, rash and wound.   Allergic/Immunologic: Negative.  Negative for environmental allergies.   Neurological: Negative.  Negative for dizziness, speech difficulty, weakness, light-headedness, numbness and headaches.   Hematological: Negative.    Psychiatric/Behavioral: Negative.  Negative for confusion, decreased concentration, self-injury and suicidal ideas. The patient is not nervous/anxious.    All other systems reviewed and are negative.       Objective     Vital Signs:   /84 (BP Location: Left arm, Patient Position: Sitting, Cuff Size: Large Adult)   Pulse 75   Resp 18   Ht 160 cm (63\")   Wt 112 kg (247 lb 12.8 oz)   SpO2 100%   BMI 43.90 kg/m²   Current Outpatient Medications on File Prior to Visit   Medication Sig Dispense Refill    bisoprolol-hydrochlorothiazide (ZIAC) 10-6.25 MG per tablet Take 1 tablet by mouth Daily. 90 tablet 1    Cholecalciferol (VITAMIN D3 PO) Take 2,000 Units by mouth Daily.      polyethylene glycol (MiraLax) 17 GM/SCOOP powder Take as directed per instructions for bowel prep 238 g 0    sodium-potassium-magnesium " sulfates (SUPREP) 17.5-3.13-1.6 GM/177ML solution oral solution 5 PM the day before your scheduled colonoscopy - Take your 1st dose of bowel prep   5 AM - Take your 2nd dose of bowel prep   You may still have watery bowel movements after you finish drinking the solution. 354 mL 0    sorbitol 70 % solution solution Take 100 ml by mouth as directed for 1 day 100 mL 0     No current facility-administered medications on file prior to visit.        Past Medical History:   Diagnosis Date    Allergic     Hypertension     Obesity       Past Surgical History:   Procedure Laterality Date    CHOLECYSTECTOMY      DENTAL PROCEDURE      ERCP N/A 10/6/2023    Procedure: ENDOSCOPIC RETROGRADE CHOLANGIOPANCREATOGRAPHYWITH BALLOON CLEAREANCE (12MM - 15MM BALLOON) UP TO 15MM OF BILE DUCT;  Surgeon: Valentin Campos MD;  Location: [facility] ENDOSCOPY;  Service: Gastroenterology;  Laterality: N/A;  CHOLANGITIS      Family History   Problem Relation Age of Onset    Hypertension Mother     COPD Mother     Hypertension Father     Heart disease Father     Stroke Father     Cancer Father         Prostate    Diabetes Father     Breast cancer Sister 47    Cancer Sister         Breast      Social History     Socioeconomic History    Marital status:    Tobacco Use    Smoking status: Never     Passive exposure: Never    Smokeless tobacco: Never   Vaping Use    Vaping status: Never Used   Substance and Sexual Activity    Alcohol use: Not Currently    Drug use: Never    Sexual activity: Defer         No visits with results within 3 Month(s) from this visit.   Latest known visit with results is:   Office Visit on 09/17/2024   Component Date Value Ref Range Status    Glucose 09/17/2024 102 (H)  65 - 99 mg/dL Final    BUN 09/17/2024 14  6 - 20 mg/dL Final    Creatinine 09/17/2024 0.76  0.57 - 1.00 mg/dL Final    Sodium 09/17/2024 141  136 - 145 mmol/L Final    Potassium 09/17/2024 4.2  3.5 - 5.2 mmol/L Final    Chloride 09/17/2024 107   98 - 107 mmol/L Final    CO2 09/17/2024 23.2  22.0 - 29.0 mmol/L Final    Calcium 09/17/2024 10.1  8.6 - 10.5 mg/dL Final    Total Protein 09/17/2024 7.2  6.0 - 8.5 g/dL Final    Albumin 09/17/2024 4.1  3.5 - 5.2 g/dL Final    ALT (SGPT) 09/17/2024 58 (H)  1 - 33 U/L Final    AST (SGOT) 09/17/2024 54 (H)  1 - 32 U/L Final    Alkaline Phosphatase 09/17/2024 264 (H)  39 - 117 U/L Final    Total Bilirubin 09/17/2024 0.7  0.0 - 1.2 mg/dL Final    Globulin 09/17/2024 3.1  gm/dL Final    A/G Ratio 09/17/2024 1.3  g/dL Final    BUN/Creatinine Ratio 09/17/2024 18.4  7.0 - 25.0 Final    Anion Gap 09/17/2024 10.8  5.0 - 15.0 mmol/L Final    eGFR 09/17/2024 92.1  >60.0 mL/min/1.73 Final    Hemoglobin A1C 09/17/2024 5.20  4.80 - 5.60 % Final    Total Cholesterol 09/17/2024 199  0 - 200 mg/dL Final    Triglycerides 09/17/2024 66  0 - 150 mg/dL Final    HDL Cholesterol 09/17/2024 58  40 - 60 mg/dL Final    LDL Cholesterol  09/17/2024 129 (H)  0 - 100 mg/dL Final    VLDL Cholesterol 09/17/2024 12  5 - 40 mg/dL Final    LDL/HDL Ratio 09/17/2024 2.20   Final    TSH 09/17/2024 0.905  0.270 - 4.200 uIU/mL Final    WBC 09/17/2024 5.81  3.40 - 10.80 10*3/mm3 Final    RBC 09/17/2024 5.46 (H)  3.77 - 5.28 10*6/mm3 Final    Hemoglobin 09/17/2024 15.9  12.0 - 15.9 g/dL Final    Hematocrit 09/17/2024 47.4 (H)  34.0 - 46.6 % Final    MCV 09/17/2024 86.8  79.0 - 97.0 fL Final    MCH 09/17/2024 29.1  26.6 - 33.0 pg Final    MCHC 09/17/2024 33.5  31.5 - 35.7 g/dL Final    RDW 09/17/2024 12.1 (L)  12.3 - 15.4 % Final    RDW-SD 09/17/2024 37.9  37.0 - 54.0 fl Final    MPV 09/17/2024 11.7  6.0 - 12.0 fL Final    Platelets 09/17/2024 222  140 - 450 10*3/mm3 Final    Neutrophil % 09/17/2024 68.7  42.7 - 76.0 % Final    Lymphocyte % 09/17/2024 22.7  19.6 - 45.3 % Final    Monocyte % 09/17/2024 5.7  5.0 - 12.0 % Final    Eosinophil % 09/17/2024 1.4  0.3 - 6.2 % Final    Basophil % 09/17/2024 1.2  0.0 - 1.5 % Final    Immature Grans % 09/17/2024 0.3   0.0 - 0.5 % Final    Neutrophils, Absolute 09/17/2024 3.99  1.70 - 7.00 10*3/mm3 Final    Lymphocytes, Absolute 09/17/2024 1.32  0.70 - 3.10 10*3/mm3 Final    Monocytes, Absolute 09/17/2024 0.33  0.10 - 0.90 10*3/mm3 Final    Eosinophils, Absolute 09/17/2024 0.08  0.00 - 0.40 10*3/mm3 Final    Basophils, Absolute 09/17/2024 0.07  0.00 - 0.20 10*3/mm3 Final    Immature Grans, Absolute 09/17/2024 0.02  0.00 - 0.05 10*3/mm3 Final    nRBC 09/17/2024 0.0  0.0 - 0.2 /100 WBC Final       The 10-year ASCVD risk score (Tacos DSOUZA, et al., 2019) is: 3%    Values used to calculate the score:      Age: 54 years      Sex: Female      Is Non- : No      Diabetic: No      Tobacco smoker: No      Systolic Blood Pressure: 133 mmHg      Is BP treated: Yes      HDL Cholesterol: 58 mg/dL      Total Cholesterol: 199 mg/dL       Physical Exam  Vitals and nursing note reviewed.   Constitutional:       Appearance: Normal appearance. She is normal weight.   HENT:      Head: Normocephalic and atraumatic.   Cardiovascular:      Rate and Rhythm: Normal rate and regular rhythm.      Pulses: Normal pulses.      Heart sounds: Normal heart sounds. No murmur heard.     No friction rub. No gallop.   Pulmonary:      Effort: Pulmonary effort is normal. No respiratory distress.      Breath sounds: Normal breath sounds. No stridor. No wheezing, rhonchi or rales.   Chest:      Chest wall: No tenderness.   Abdominal:      General: Abdomen is flat. Bowel sounds are normal. There is no distension.      Palpations: Abdomen is soft. There is no mass.      Tenderness: There is no abdominal tenderness. There is no right CVA tenderness, left CVA tenderness, guarding or rebound.      Hernia: No hernia is present.   Skin:     General: Skin is warm and dry.      Capillary Refill: Capillary refill takes less than 2 seconds.      Coloration: Skin is not jaundiced or pale.   Neurological:      General: No focal deficit present.      Mental  Status: She is alert and oriented to person, place, and time. Mental status is at baseline.      Motor: No weakness.      Coordination: Coordination normal.      Gait: Gait normal.   Psychiatric:         Mood and Affect: Mood normal.         Behavior: Behavior normal.         Thought Content: Thought content normal.         Judgment: Judgment normal.          Physical Exam  Vital Signs  Blood pressure is 133/84.       Result Review  Data Reviewed:{ Labs  Result Review  Imaging  Med Tab  Media :23}   I have reviewed this patient's chart.  I have reviewed previous labs, previous imaging, previous medications, and previous encounters with notes that were available in this patient's chart.    Results  Laboratory Studies  Cholesterol was 29 points above normal in September.              Assessment and Plan {CC Problem List  Visit Diagnosis  ROS  Review (Popup)  [facility]  Quality  BestPractice  Medications  SmartSets  SnapShot Encounters  Media :23}   Diagnoses and all orders for this visit:    1. Primary hypertension (Primary)  -     CBC & Differential  -     Comprehensive Metabolic Panel    2. Elevated liver enzymes  -     Comprehensive Metabolic Panel    3. Mixed hyperlipidemia  -     Lipid Panel        Assessment & Plan  1. Hypertension.  Her blood pressure readings have been consistently within the normal range, both at home and during work hours. Today's reading is 133/84. She is advised to continue her current medication regimen, which includes bisoprolol with hydrochlorothiazide.    2. Hyperlipidemia.  Her cholesterol levels were previously noted to be elevated by 29 points above the optimal range. Given her overall health status, the initiation of cholesterol-lowering medication is not deemed necessary at this juncture. Her risk for heart attack or stroke is maximum of 3% right now based upon all of her potential factors in her cholesterol levels. She is encouraged to persist with  lifestyle modifications, particularly dietary changes aimed at reducing fat intake. A re-evaluation of her cholesterol levels will be conducted today. A comprehensive metabolic panel (CMP), complete blood count (CBC), and lipid panel will be ordered.       -Fasting labs today.  We will continue to monitor liver enzymes.  -ER red flags discussed with patient including risk versus benefit and education provided.  -Follow-up with me in 3 months or sooner if needed.    I spent 30 minutes caring for Idalia on this date of service. This time includes time spent by me in the following activities:preparing for the visit, reviewing tests, obtaining and/or reviewing a separately obtained history, performing a medically appropriate examination and/or evaluation , counseling and educating the patient/family/caregiver, ordering medications, tests, or procedures, referring and communicating with other health care professionals , documenting information in the medical record, independently interpreting results and communicating that information with the patient/family/caregiver, and care coordination.    Follow Up {Instructions Charge Capture  Follow-up Communications :23}     Patient was given instructions and counseling regarding her condition or for health maintenance advice. Please see specific information pulled into the AVS (placed there by myself) if appropriate.    Return in about 3 months (around 3/18/2025) for routine follow up.    Class 3 Severe Obesity (BMI >=40). Obesity-related health conditions include the following: hypertension. Obesity is unchanged. BMI is is above average; BMI management plan is completed. We discussed portion control and increasing exercise.         SANYA Kasper, [facility]      Patient or patient representative verbalized consent for the use of Ambient Listening during the visit with  SANYA Kasper for chart documentation. 12/18/2024  12:58 EST

## 2024-12-18 NOTE — PROGRESS NOTES
Venipuncture Blood Specimen Collection  Venipuncture performed in LT ARM by Marilin Jordan with good hemostasis. Patient tolerated the procedure well without complications.   12/18/24   Marilin Jordan

## 2024-12-19 LAB
ALBUMIN SERPL-MCNC: 4.1 G/DL (ref 3.5–5.2)
ALBUMIN/GLOB SERPL: 1.1 G/DL
ALP SERPL-CCNC: 432 U/L (ref 39–117)
ALT SERPL W P-5'-P-CCNC: 97 U/L (ref 1–33)
ANION GAP SERPL CALCULATED.3IONS-SCNC: 9.5 MMOL/L (ref 5–15)
AST SERPL-CCNC: 89 U/L (ref 1–32)
BASOPHILS # BLD AUTO: 0.07 10*3/MM3 (ref 0–0.2)
BASOPHILS NFR BLD AUTO: 1.4 % (ref 0–1.5)
BILIRUB SERPL-MCNC: 0.7 MG/DL (ref 0–1.2)
BUN SERPL-MCNC: 14 MG/DL (ref 6–20)
BUN/CREAT SERPL: 16.3 (ref 7–25)
CALCIUM SPEC-SCNC: 10.2 MG/DL (ref 8.6–10.5)
CHLORIDE SERPL-SCNC: 107 MMOL/L (ref 98–107)
CHOLEST SERPL-MCNC: 194 MG/DL (ref 0–200)
CO2 SERPL-SCNC: 25.5 MMOL/L (ref 22–29)
CREAT SERPL-MCNC: 0.86 MG/DL (ref 0.57–1)
DEPRECATED RDW RBC AUTO: 38.2 FL (ref 37–54)
EGFRCR SERPLBLD CKD-EPI 2021: 79.4 ML/MIN/1.73
EOSINOPHIL # BLD AUTO: 0.12 10*3/MM3 (ref 0–0.4)
EOSINOPHIL NFR BLD AUTO: 2.4 % (ref 0.3–6.2)
ERYTHROCYTE [DISTWIDTH] IN BLOOD BY AUTOMATED COUNT: 11.8 % (ref 12.3–15.4)
GLOBULIN UR ELPH-MCNC: 3.7 GM/DL
GLUCOSE SERPL-MCNC: 94 MG/DL (ref 65–99)
HCT VFR BLD AUTO: 47.5 % (ref 34–46.6)
HDLC SERPL-MCNC: 56 MG/DL (ref 40–60)
HGB BLD-MCNC: 15.9 G/DL (ref 12–15.9)
IMM GRANULOCYTES # BLD AUTO: 0.02 10*3/MM3 (ref 0–0.05)
IMM GRANULOCYTES NFR BLD AUTO: 0.4 % (ref 0–0.5)
LDLC SERPL CALC-MCNC: 125 MG/DL (ref 0–100)
LDLC/HDLC SERPL: 2.22 {RATIO}
LYMPHOCYTES # BLD AUTO: 1.43 10*3/MM3 (ref 0.7–3.1)
LYMPHOCYTES NFR BLD AUTO: 28 % (ref 19.6–45.3)
MCH RBC QN AUTO: 29.7 PG (ref 26.6–33)
MCHC RBC AUTO-ENTMCNC: 33.5 G/DL (ref 31.5–35.7)
MCV RBC AUTO: 88.6 FL (ref 79–97)
MONOCYTES # BLD AUTO: 0.28 10*3/MM3 (ref 0.1–0.9)
MONOCYTES NFR BLD AUTO: 5.5 % (ref 5–12)
NEUTROPHILS NFR BLD AUTO: 3.18 10*3/MM3 (ref 1.7–7)
NEUTROPHILS NFR BLD AUTO: 62.3 % (ref 42.7–76)
NRBC BLD AUTO-RTO: 0 /100 WBC (ref 0–0.2)
PLATELET # BLD AUTO: 224 10*3/MM3 (ref 140–450)
PMV BLD AUTO: 12 FL (ref 6–12)
POTASSIUM SERPL-SCNC: 4.6 MMOL/L (ref 3.5–5.2)
PROT SERPL-MCNC: 7.8 G/DL (ref 6–8.5)
RBC # BLD AUTO: 5.36 10*6/MM3 (ref 3.77–5.28)
SODIUM SERPL-SCNC: 142 MMOL/L (ref 136–145)
TRIGL SERPL-MCNC: 68 MG/DL (ref 0–150)
VLDLC SERPL-MCNC: 13 MG/DL (ref 5–40)
WBC NRBC COR # BLD AUTO: 5.1 10*3/MM3 (ref 3.4–10.8)

## 2024-12-21 ENCOUNTER — APPOINTMENT (OUTPATIENT)
Dept: CT IMAGING | Facility: HOSPITAL | Age: 54
End: 2024-12-21
Payer: COMMERCIAL

## 2024-12-21 ENCOUNTER — HOSPITAL ENCOUNTER (OUTPATIENT)
Facility: HOSPITAL | Age: 54
Discharge: HOME | End: 2024-12-23
Attending: EMERGENCY MEDICINE | Admitting: INTERNAL MEDICINE
Payer: COMMERCIAL

## 2024-12-21 DIAGNOSIS — R10.11 RIGHT UPPER QUADRANT PAIN: Primary | ICD-10-CM

## 2024-12-21 DIAGNOSIS — Z87.19 HISTORY OF CHOLANGITIS: ICD-10-CM

## 2024-12-21 DIAGNOSIS — R94.5 NONSPECIFIC ABNORMAL RESULTS OF LIVER FUNCTION STUDY: ICD-10-CM

## 2024-12-21 LAB
ALBUMIN SERPL-MCNC: 4.3 G/DL (ref 3.5–5.2)
ALBUMIN/GLOB SERPL: 1.2 G/DL
ALP SERPL-CCNC: 465 U/L (ref 39–117)
ALT SERPL W P-5'-P-CCNC: 126 U/L (ref 1–33)
ANION GAP SERPL CALCULATED.3IONS-SCNC: 11.2 MMOL/L (ref 5–15)
AST SERPL-CCNC: 122 U/L (ref 1–32)
BACTERIA UR QL AUTO: ABNORMAL /HPF
BASOPHILS # BLD AUTO: 0.05 10*3/MM3 (ref 0–0.2)
BASOPHILS NFR BLD AUTO: 0.6 % (ref 0–1.5)
BILIRUB SERPL-MCNC: 0.6 MG/DL (ref 0–1.2)
BILIRUB UR QL STRIP: NEGATIVE
BUN SERPL-MCNC: 15 MG/DL (ref 6–20)
BUN/CREAT SERPL: 17 (ref 7–25)
CALCIUM SPEC-SCNC: 10.5 MG/DL (ref 8.6–10.5)
CHLORIDE SERPL-SCNC: 104 MMOL/L (ref 98–107)
CLARITY UR: ABNORMAL
CO2 SERPL-SCNC: 26.8 MMOL/L (ref 22–29)
COLOR UR: ABNORMAL
CREAT SERPL-MCNC: 0.88 MG/DL (ref 0.57–1)
CRP SERPL-MCNC: 1.32 MG/DL (ref 0–0.5)
D-LACTATE SERPL-SCNC: 0.7 MMOL/L (ref 0.3–2)
DEPRECATED RDW RBC AUTO: 40.3 FL (ref 37–54)
EGFRCR SERPLBLD CKD-EPI 2021: 77.2 ML/MIN/1.73
EOSINOPHIL # BLD AUTO: 0.09 10*3/MM3 (ref 0–0.4)
EOSINOPHIL NFR BLD AUTO: 1.1 % (ref 0.3–6.2)
ERYTHROCYTE [DISTWIDTH] IN BLOOD BY AUTOMATED COUNT: 12.2 % (ref 12.3–15.4)
GLOBULIN UR ELPH-MCNC: 3.6 GM/DL
GLUCOSE SERPL-MCNC: 96 MG/DL (ref 65–99)
GLUCOSE UR STRIP-MCNC: NEGATIVE MG/DL
HCT VFR BLD AUTO: 47.2 % (ref 34–46.6)
HGB BLD-MCNC: 15.8 G/DL (ref 12–15.9)
HGB UR QL STRIP.AUTO: ABNORMAL
HYALINE CASTS UR QL AUTO: ABNORMAL /LPF
IMM GRANULOCYTES # BLD AUTO: 0.03 10*3/MM3 (ref 0–0.05)
IMM GRANULOCYTES NFR BLD AUTO: 0.4 % (ref 0–0.5)
KETONES UR QL STRIP: ABNORMAL
LEUKOCYTE ESTERASE UR QL STRIP.AUTO: NEGATIVE
LIPASE SERPL-CCNC: 20 U/L (ref 13–60)
LYMPHOCYTES # BLD AUTO: 2.12 10*3/MM3 (ref 0.7–3.1)
LYMPHOCYTES NFR BLD AUTO: 26.7 % (ref 19.6–45.3)
MCH RBC QN AUTO: 29.9 PG (ref 26.6–33)
MCHC RBC AUTO-ENTMCNC: 33.5 G/DL (ref 31.5–35.7)
MCV RBC AUTO: 89.4 FL (ref 79–97)
MONOCYTES # BLD AUTO: 0.5 10*3/MM3 (ref 0.1–0.9)
MONOCYTES NFR BLD AUTO: 6.3 % (ref 5–12)
NEUTROPHILS NFR BLD AUTO: 5.15 10*3/MM3 (ref 1.7–7)
NEUTROPHILS NFR BLD AUTO: 64.9 % (ref 42.7–76)
NITRITE UR QL STRIP: NEGATIVE
NRBC BLD AUTO-RTO: 0 /100 WBC (ref 0–0.2)
PH UR STRIP.AUTO: 5.5 [PH] (ref 5–8)
PLATELET # BLD AUTO: 219 10*3/MM3 (ref 140–450)
PMV BLD AUTO: 11.2 FL (ref 6–12)
POTASSIUM SERPL-SCNC: 4 MMOL/L (ref 3.5–5.2)
PROT SERPL-MCNC: 7.9 G/DL (ref 6–8.5)
PROT UR QL STRIP: NEGATIVE
RBC # BLD AUTO: 5.28 10*6/MM3 (ref 3.77–5.28)
RBC # UR STRIP: ABNORMAL /HPF
REF LAB TEST METHOD: ABNORMAL
SODIUM SERPL-SCNC: 142 MMOL/L (ref 136–145)
SP GR UR STRIP: 1.02 (ref 1–1.03)
SQUAMOUS #/AREA URNS HPF: ABNORMAL /HPF
UROBILINOGEN UR QL STRIP: ABNORMAL
WBC # UR STRIP: ABNORMAL /HPF
WBC NRBC COR # BLD AUTO: 7.94 10*3/MM3 (ref 3.4–10.8)

## 2024-12-21 PROCEDURE — 36415 COLL VENOUS BLD VENIPUNCTURE: CPT

## 2024-12-21 PROCEDURE — 81001 URINALYSIS AUTO W/SCOPE: CPT | Performed by: EMERGENCY MEDICINE

## 2024-12-21 PROCEDURE — 99285 EMERGENCY DEPT VISIT HI MDM: CPT

## 2024-12-21 PROCEDURE — 25010000002 ONDANSETRON PER 1 MG: Performed by: EMERGENCY MEDICINE

## 2024-12-21 PROCEDURE — 83690 ASSAY OF LIPASE: CPT | Performed by: EMERGENCY MEDICINE

## 2024-12-21 PROCEDURE — 83605 ASSAY OF LACTIC ACID: CPT

## 2024-12-21 PROCEDURE — 25810000003 SODIUM CHLORIDE 0.9 % SOLUTION: Performed by: EMERGENCY MEDICINE

## 2024-12-21 PROCEDURE — G0378 HOSPITAL OBSERVATION PER HR: HCPCS

## 2024-12-21 PROCEDURE — 85025 COMPLETE CBC W/AUTO DIFF WBC: CPT | Performed by: EMERGENCY MEDICINE

## 2024-12-21 PROCEDURE — 87040 BLOOD CULTURE FOR BACTERIA: CPT | Performed by: EMERGENCY MEDICINE

## 2024-12-21 PROCEDURE — 25510000001 IOPAMIDOL PER 1 ML: Performed by: EMERGENCY MEDICINE

## 2024-12-21 PROCEDURE — 74177 CT ABD & PELVIS W/CONTRAST: CPT

## 2024-12-21 PROCEDURE — 86140 C-REACTIVE PROTEIN: CPT | Performed by: EMERGENCY MEDICINE

## 2024-12-21 PROCEDURE — 80053 COMPREHEN METABOLIC PANEL: CPT | Performed by: EMERGENCY MEDICINE

## 2024-12-21 PROCEDURE — 96375 TX/PRO/DX INJ NEW DRUG ADDON: CPT

## 2024-12-21 RX ORDER — ONDANSETRON 2 MG/ML
4 INJECTION INTRAMUSCULAR; INTRAVENOUS ONCE
Status: COMPLETED | OUTPATIENT
Start: 2024-12-21 | End: 2024-12-21

## 2024-12-21 RX ORDER — IOPAMIDOL 755 MG/ML
100 INJECTION, SOLUTION INTRAVASCULAR
Status: COMPLETED | OUTPATIENT
Start: 2024-12-21 | End: 2024-12-21

## 2024-12-21 RX ORDER — PANTOPRAZOLE SODIUM 40 MG/10ML
40 INJECTION, POWDER, LYOPHILIZED, FOR SOLUTION INTRAVENOUS ONCE
Status: COMPLETED | OUTPATIENT
Start: 2024-12-21 | End: 2024-12-21

## 2024-12-21 RX ADMIN — SODIUM CHLORIDE 1000 ML: 9 INJECTION, SOLUTION INTRAVENOUS at 20:26

## 2024-12-21 RX ADMIN — ONDANSETRON 4 MG: 2 INJECTION INTRAMUSCULAR; INTRAVENOUS at 20:31

## 2024-12-21 RX ADMIN — IOPAMIDOL 100 ML: 755 INJECTION, SOLUTION INTRAVENOUS at 21:55

## 2024-12-21 RX ADMIN — PANTOPRAZOLE SODIUM 40 MG: 40 INJECTION, POWDER, FOR SOLUTION INTRAVENOUS at 20:26

## 2024-12-22 ENCOUNTER — ON CAMPUS - OUTPATIENT (OUTPATIENT)
Dept: URBAN - METROPOLITAN AREA HOSPITAL 85 | Facility: HOSPITAL | Age: 54
End: 2024-12-22
Payer: COMMERCIAL

## 2024-12-22 ENCOUNTER — APPOINTMENT (OUTPATIENT)
Dept: MRI IMAGING | Facility: HOSPITAL | Age: 54
End: 2024-12-22
Payer: COMMERCIAL

## 2024-12-22 DIAGNOSIS — R10.11 RIGHT UPPER QUADRANT PAIN: ICD-10-CM

## 2024-12-22 DIAGNOSIS — K83.8 OTHER SPECIFIED DISEASES OF BILIARY TRACT: ICD-10-CM

## 2024-12-22 DIAGNOSIS — R74.01 ELEVATION OF LEVELS OF LIVER TRANSAMINASE LEVELS: ICD-10-CM

## 2024-12-22 DIAGNOSIS — R93.3 ABNORMAL FINDINGS ON DIAGNOSTIC IMAGING OF OTHER PARTS OF DI: ICD-10-CM

## 2024-12-22 DIAGNOSIS — Z90.49 ACQUIRED ABSENCE OF OTHER SPECIFIED PARTS OF DIGESTIVE TRACT: ICD-10-CM

## 2024-12-22 DIAGNOSIS — R11.0 NAUSEA: ICD-10-CM

## 2024-12-22 LAB
ALBUMIN SERPL-MCNC: 3.6 G/DL (ref 3.5–5.2)
ALP SERPL-CCNC: 385 U/L (ref 39–117)
ALT SERPL W P-5'-P-CCNC: 97 U/L (ref 1–33)
AST SERPL-CCNC: 93 U/L (ref 1–32)
BASOPHILS # BLD AUTO: 0.04 10*3/MM3 (ref 0–0.2)
BASOPHILS NFR BLD AUTO: 0.7 % (ref 0–1.5)
BILIRUB CONJ SERPL-MCNC: 0.4 MG/DL (ref 0–0.3)
BILIRUB INDIRECT SERPL-MCNC: 0.5 MG/DL
BILIRUB SERPL-MCNC: 0.9 MG/DL (ref 0–1.2)
DEPRECATED RDW RBC AUTO: 41.1 FL (ref 37–54)
EOSINOPHIL # BLD AUTO: 0.08 10*3/MM3 (ref 0–0.4)
EOSINOPHIL NFR BLD AUTO: 1.5 % (ref 0.3–6.2)
ERYTHROCYTE [DISTWIDTH] IN BLOOD BY AUTOMATED COUNT: 12.6 % (ref 12.3–15.4)
HCT VFR BLD AUTO: 41.3 % (ref 34–46.6)
HGB BLD-MCNC: 13.6 G/DL (ref 12–15.9)
IMM GRANULOCYTES # BLD AUTO: 0.01 10*3/MM3 (ref 0–0.05)
IMM GRANULOCYTES NFR BLD AUTO: 0.2 % (ref 0–0.5)
LIPASE SERPL-CCNC: 19 U/L (ref 13–60)
LYMPHOCYTES # BLD AUTO: 1.47 10*3/MM3 (ref 0.7–3.1)
LYMPHOCYTES NFR BLD AUTO: 27.5 % (ref 19.6–45.3)
MCH RBC QN AUTO: 29.3 PG (ref 26.6–33)
MCHC RBC AUTO-ENTMCNC: 32.9 G/DL (ref 31.5–35.7)
MCV RBC AUTO: 89 FL (ref 79–97)
MONOCYTES # BLD AUTO: 0.32 10*3/MM3 (ref 0.1–0.9)
MONOCYTES NFR BLD AUTO: 6 % (ref 5–12)
NEUTROPHILS NFR BLD AUTO: 3.42 10*3/MM3 (ref 1.7–7)
NEUTROPHILS NFR BLD AUTO: 64.1 % (ref 42.7–76)
NRBC BLD AUTO-RTO: 0 /100 WBC (ref 0–0.2)
PLATELET # BLD AUTO: 182 10*3/MM3 (ref 140–450)
PMV BLD AUTO: 11.3 FL (ref 6–12)
PROT SERPL-MCNC: 6.7 G/DL (ref 6–8.5)
RBC # BLD AUTO: 4.64 10*6/MM3 (ref 3.77–5.28)
WBC NRBC COR # BLD AUTO: 5.34 10*3/MM3 (ref 3.4–10.8)

## 2024-12-22 PROCEDURE — 82787 IGG 1 2 3 OR 4 EACH: CPT | Performed by: NURSE PRACTITIONER

## 2024-12-22 PROCEDURE — 80076 HEPATIC FUNCTION PANEL: CPT | Performed by: EMERGENCY MEDICINE

## 2024-12-22 PROCEDURE — 82784 ASSAY IGA/IGD/IGG/IGM EACH: CPT | Performed by: NURSE PRACTITIONER

## 2024-12-22 PROCEDURE — 25010000002 PIPERACILLIN SOD-TAZOBACTAM PER 1 G: Performed by: NURSE PRACTITIONER

## 2024-12-22 PROCEDURE — 86038 ANTINUCLEAR ANTIBODIES: CPT | Performed by: NURSE PRACTITIONER

## 2024-12-22 PROCEDURE — 96365 THER/PROPH/DIAG IV INF INIT: CPT

## 2024-12-22 PROCEDURE — 25010000002 GADOTERIDOL PER 1 ML: Performed by: EMERGENCY MEDICINE

## 2024-12-22 PROCEDURE — 85025 COMPLETE CBC W/AUTO DIFF WBC: CPT | Performed by: EMERGENCY MEDICINE

## 2024-12-22 PROCEDURE — 83516 IMMUNOASSAY NONANTIBODY: CPT | Performed by: NURSE PRACTITIONER

## 2024-12-22 PROCEDURE — G0378 HOSPITAL OBSERVATION PER HR: HCPCS

## 2024-12-22 PROCEDURE — 86376 MICROSOMAL ANTIBODY EACH: CPT | Performed by: NURSE PRACTITIONER

## 2024-12-22 PROCEDURE — A9579 GAD-BASE MR CONTRAST NOS,1ML: HCPCS | Performed by: EMERGENCY MEDICINE

## 2024-12-22 PROCEDURE — 86015 ACTIN ANTIBODY EACH: CPT | Performed by: NURSE PRACTITIONER

## 2024-12-22 PROCEDURE — 83690 ASSAY OF LIPASE: CPT | Performed by: EMERGENCY MEDICINE

## 2024-12-22 PROCEDURE — 99214 OFFICE O/P EST MOD 30 MIN: CPT | Performed by: NURSE PRACTITIONER

## 2024-12-22 PROCEDURE — 74183 MRI ABD W/O CNTR FLWD CNTR: CPT

## 2024-12-22 PROCEDURE — 96361 HYDRATE IV INFUSION ADD-ON: CPT

## 2024-12-22 RX ORDER — SODIUM CHLORIDE 450 MG/100ML
100 INJECTION, SOLUTION INTRAVENOUS CONTINUOUS
Status: DISPENSED | OUTPATIENT
Start: 2024-12-22 | End: 2024-12-22

## 2024-12-22 RX ORDER — ONDANSETRON 2 MG/ML
4 INJECTION INTRAMUSCULAR; INTRAVENOUS EVERY 6 HOURS PRN
Status: DISCONTINUED | OUTPATIENT
Start: 2024-12-22 | End: 2024-12-22 | Stop reason: SDUPTHER

## 2024-12-22 RX ORDER — SODIUM CHLORIDE 9 MG/ML
40 INJECTION, SOLUTION INTRAVENOUS AS NEEDED
Status: DISCONTINUED | OUTPATIENT
Start: 2024-12-22 | End: 2024-12-23 | Stop reason: HOSPADM

## 2024-12-22 RX ORDER — HYDROMORPHONE HYDROCHLORIDE 1 MG/ML
0.5 INJECTION, SOLUTION INTRAMUSCULAR; INTRAVENOUS; SUBCUTANEOUS
Status: DISCONTINUED | OUTPATIENT
Start: 2024-12-22 | End: 2024-12-23 | Stop reason: HOSPADM

## 2024-12-22 RX ORDER — BISOPROLOL FUMARATE 5 MG/1
10 TABLET, FILM COATED ORAL DAILY
Status: DISCONTINUED | OUTPATIENT
Start: 2024-12-22 | End: 2024-12-23 | Stop reason: HOSPADM

## 2024-12-22 RX ORDER — SODIUM CHLORIDE 450 MG/100ML
100 INJECTION, SOLUTION INTRAVENOUS CONTINUOUS
Status: DISPENSED | OUTPATIENT
Start: 2024-12-22 | End: 2024-12-23

## 2024-12-22 RX ORDER — ACETAMINOPHEN 325 MG/1
650 TABLET ORAL EVERY 6 HOURS PRN
Status: DISCONTINUED | OUTPATIENT
Start: 2024-12-22 | End: 2024-12-23 | Stop reason: HOSPADM

## 2024-12-22 RX ORDER — HYDROCHLOROTHIAZIDE 12.5 MG/1
6.25 TABLET ORAL DAILY
Status: DISCONTINUED | OUTPATIENT
Start: 2024-12-22 | End: 2024-12-23 | Stop reason: HOSPADM

## 2024-12-22 RX ORDER — SODIUM CHLORIDE 0.9 % (FLUSH) 0.9 %
10 SYRINGE (ML) INJECTION AS NEEDED
Status: DISCONTINUED | OUTPATIENT
Start: 2024-12-22 | End: 2024-12-23 | Stop reason: HOSPADM

## 2024-12-22 RX ORDER — ONDANSETRON 2 MG/ML
4 INJECTION INTRAMUSCULAR; INTRAVENOUS EVERY 6 HOURS PRN
Status: DISCONTINUED | OUTPATIENT
Start: 2024-12-22 | End: 2024-12-23 | Stop reason: SDUPTHER

## 2024-12-22 RX ORDER — SODIUM CHLORIDE 0.9 % (FLUSH) 0.9 %
10 SYRINGE (ML) INJECTION EVERY 12 HOURS SCHEDULED
Status: DISCONTINUED | OUTPATIENT
Start: 2024-12-22 | End: 2024-12-23 | Stop reason: HOSPADM

## 2024-12-22 RX ADMIN — Medication 10 ML: at 10:59

## 2024-12-22 RX ADMIN — PIPERACILLIN AND TAZOBACTAM 4.5 G: 4; .5 INJECTION, POWDER, FOR SOLUTION INTRAVENOUS at 23:23

## 2024-12-22 RX ADMIN — PIPERACILLIN AND TAZOBACTAM 4.5 G: 4; .5 INJECTION, POWDER, FOR SOLUTION INTRAVENOUS at 10:36

## 2024-12-22 RX ADMIN — Medication 10 ML: at 01:09

## 2024-12-22 RX ADMIN — SODIUM CHLORIDE 100 ML/HR: 4.5 INJECTION, SOLUTION INTRAVENOUS at 01:08

## 2024-12-22 RX ADMIN — PIPERACILLIN AND TAZOBACTAM 4.5 G: 4; .5 INJECTION, POWDER, FOR SOLUTION INTRAVENOUS at 16:05

## 2024-12-22 RX ADMIN — BISOPROLOL FUMARATE 10 MG: 5 TABLET ORAL at 10:40

## 2024-12-22 RX ADMIN — GADOTERIDOL 20 ML: 279.3 INJECTION, SOLUTION INTRAVENOUS at 15:42

## 2024-12-22 RX ADMIN — HYDROCHLOROTHIAZIDE 6.25 MG: 12.5 TABLET ORAL at 10:40

## 2024-12-22 NOTE — PLAN OF CARE
Goal Outcome Evaluation:  Plan of Care Reviewed With: patient        Progress: improving   Patient alert and oriented on room air. Patient states that she is currently not in pain, the pain is intermittent. Patient calm and cooperative.           Problem: Adult Inpatient Plan of Care  Goal: Plan of Care Review  Outcome: Progressing  Flowsheets (Taken 12/22/2024 0812)  Progress: improving  Plan of Care Reviewed With: patient  Goal: Patient-Specific Goal (Individualized)  Outcome: Progressing  Goal: Absence of Hospital-Acquired Illness or Injury  Outcome: Progressing  Intervention: Identify and Manage Fall Risk  Recent Flowsheet Documentation  Taken 12/22/2024 0800 by Izabel Bergeron RN  Safety Promotion/Fall Prevention: safety round/check completed  Taken 12/22/2024 0700 by Izabel Bergeron RN  Safety Promotion/Fall Prevention: safety round/check completed  Intervention: Prevent Skin Injury  Recent Flowsheet Documentation  Taken 12/22/2024 0800 by Izabel Bergeron RN  Body Position: position changed independently  Intervention: Prevent Infection  Recent Flowsheet Documentation  Taken 12/22/2024 0800 by Izabel Bergeron RN  Infection Prevention: hand hygiene promoted  Goal: Optimal Comfort and Wellbeing  Outcome: Progressing  Goal: Readiness for Transition of Care  Outcome: Progressing

## 2024-12-22 NOTE — CONSULTS
GI CONSULT  NOTE:    Referring Provider:     MD Gibran    Chief complaint:    Right upper quadrant abdominal pain, abnormal liver function test, history of the ascending cholangitis    Subjective    Right upper quadrant abdominal pain since 12/17/2024.  Nausea x 48 hours    History of present illness:     Patient is a 54-year-old female status post Cholecystectomy with a history of ascending cholangitis status post ERCP October 2023, hypertension, hyperlipidemia presented to the ER on 12/21/2024 with a complaint of right upper quadrant abdominal pain since 12/17/2024 and nausea for 48 hours.  Patient was evaluated in the ER found to have elevated LFTs.  CT of the abdomen pelvis was done that showed small hiatal hernia, CBD 9 mm.  GI was consulted for right upper quadrant abdominal pain, abnormal liver function test.    Patient states this is about her third episode of right upper quadrant abdominal pain since having her gallbladder out 1994 due to gallstones.  Patient states she normally just treats the pain at home.  Patient states she became scared because she had sepsis with her last episode of cholangitis in 2023.  Patient had been watching her labs on her portal and knew that they were elevated so she decided to come in.  Patient states her right upper quadrant abdominal pain started gradually on 12/17/2024.  States it was a dull ache and eventually became sharp in nature.  Pain is intermittent.  Patient denies taking any over-the-counter medications for her symptoms.  She had nausea but no vomiting and positive belching.  Patient is having daily bowel movements states earlier in the week it was loose and brown but yesterday she had a solid formed brown bowel movement.  She denies any blood or black in her stool.  Patient states she took ursodiol for 30 days after her ERCP and then it was discontinued.    LABS: Sodium potassium are normal.  Creatinine 0.88.  TB 0.6, alk phos 385 (465), AST 93 (122), ALT 97  (126).  Lipase 19.  WBCs 5.34, hemoglobin 13.6, platelets 182.    Endo History:  10/2023 ERCP (Dr. Anthony) -balloon clearance of the bile duct without evidence of stone or debris with purulent discharge from CBD noted suggestive of a passed stone  No previous colonoscopy.    Past Medical History:  Past Medical History:   Diagnosis Date    Hypertension     Obesity        Past Surgical History:  Past Surgical History:   Procedure Laterality Date    CHOLECYSTECTOMY      DENTAL PROCEDURE      ERCP N/A 10/6/2023    Procedure: ENDOSCOPIC RETROGRADE CHOLANGIOPANCREATOGRAPHYWITH BALLOON CLEAREANCE (12MM - 15MM BALLOON) UP TO 15MM OF BILE DUCT;  Surgeon: Valentin Campos MD;  Location: [facility] ENDOSCOPY;  Service: Gastroenterology;  Laterality: N/A;  CHOLANGITIS       Social History:  Social History     Tobacco Use    Smoking status: Never     Passive exposure: Never    Smokeless tobacco: Never   Vaping Use    Vaping status: Never Used   Substance Use Topics    Alcohol use: Not Currently    Drug use: Never       Family History:  Family History   Problem Relation Age of Onset    Hypertension Mother     COPD Mother     Hypertension Father     Heart disease Father     Stroke Father     Cancer Father         Prostate    Diabetes Father     Breast cancer Sister 47    Cancer Sister         Breast       Medications:  Medications Prior to Admission   Medication Sig Dispense Refill Last Dose/Taking    bisoprolol-hydrochlorothiazide (ZIAC) 10-6.25 MG per tablet Take 1 tablet by mouth Daily. 90 tablet 1 12/21/2024 at  7:00 AM    Cholecalciferol (VITAMIN D3 PO) Take 2,000 Units by mouth Daily.   12/21/2024       Scheduled Meds:bisoprolol, 2.5 mg, Oral, Daily   And  hydroCHLOROthiazide, 6.25 mg, Oral, Daily  HYDROmorphone, 0.5 mg, Intravenous, Once  sodium chloride, 10 mL, Intravenous, Q12H      Continuous Infusions:sodium chloride, 100 mL/hr, Last Rate: 100 mL/hr (12/22/24 0723)      PRN Meds:.  acetaminophen     "HYDROmorphone    melatonin    ondansetron    ondansetron    sodium chloride    sodium chloride    ALLERGIES:  Patient has no known allergies.    ROS:  Review of Systems   Constitutional:  Negative for chills and fever.   Gastrointestinal:  Positive for abdominal pain, diarrhea and nausea. Negative for anal bleeding, blood in stool, constipation, rectal pain and vomiting.       The following systems were reviewed and negative;    Constitution:  No fevers, chills, no unintentional weight loss  Skin: no rash, no jaundice  Eyes:  No blurry vision, no eye pain  HENT:  No change in hearing or smell  Resp:  No dyspnea or cough  CV:  No chest pain or palpitations  :  No dysuria, hematuria  Musculoskeletal:  No leg cramps or arthralgias  Neuro:  No tremor, no numbness  Psych:  No depression or confusion    Objective 226.  Resting in the hospital bed.  No family present.  NAD.    Vital Signs:   Vitals:    12/21/24 1530 12/22/24 0030 12/22/24 0606 12/22/24 0700   BP:  127/82 130/73 139/81   BP Location:  Left arm Left arm Left arm   Patient Position:  Sitting Sitting Sitting   Pulse:  74 74 75   Resp: 18 18 18 18   Temp: 98.7 °F (37.1 °C) 98.6 °F (37 °C) 98.7 °F (37.1 °C) 98.1 °F (36.7 °C)   TempSrc: Oral Oral Oral Oral   SpO2:  97% 96% 97%   Weight: 112 kg (247 lb 9.2 oz)      Height: 160 cm (63\")          Physical Exam:   General Appearance:    Awake and alert, in no acute distress   Head:    Normocephalic, without obvious abnormality, atraumatic   Eyes:            Conjunctivae normal, anicteric sclerae, pupils equal   Ears:    Ears appear intact with no abnormalities noted   Throat:   No oral lesions, no thrush, oral mucosa moist   Neck:   Supple, no JVD   Lungs:      respirations regular, even and unlabored        Chest Wall:    No abnormalities observed   Abdomen:     soft, right upper quadrant tender, no rebound or guarding, nondistended, no hepatosplenomegaly   Rectal:     Deferred   Extremities:   Moves all " "extremities, no edema, no cyanosis   Pulses:   Pulses palpable and equal bilaterally   Skin:   No rash, no jaundice, normal palpation        Neurologic:   Cranial nerves 2 - 12 grossly intact, no asterixis       Results Review:   I reviewed the patient's labs and imaging.  CBC    Results from last 7 days   Lab Units 12/22/24  0611 12/21/24 1946 12/18/24  1015   WBC 10*3/mm3 5.34 7.94 5.10   HEMOGLOBIN g/dL 13.6 15.8 15.9   PLATELETS 10*3/mm3 182 219 224     CMP   Results from last 7 days   Lab Units 12/22/24  0611 12/21/24 1946 12/18/24  1015   SODIUM mmol/L  --  142 142   POTASSIUM mmol/L  --  4.0 4.6   CHLORIDE mmol/L  --  104 107   CO2 mmol/L  --  26.8 25.5   BUN mg/dL  --  15 14   CREATININE mg/dL  --  0.88 0.86   GLUCOSE mg/dL  --  96 94   ALBUMIN g/dL 3.6 4.3 4.1   BILIRUBIN mg/dL 0.9 0.6 0.7   ALK PHOS U/L 385* 465* 432*   AST (SGOT) U/L 93* 122* 89*   ALT (SGPT) U/L 97* 126* 97*   LIPASE U/L 19 20  --      Cr Clearance Estimated Creatinine Clearance: 85.9 mL/min (by C-G formula based on SCr of 0.88 mg/dL).  Coag     HbA1C   Lab Results   Component Value Date    HGBA1C 5.20 09/17/2024    HGBA1C 4.7 01/26/2023    HGBA1C 5.4 02/22/2022     Blood Glucose No results found for: \"POCGLU\"  Infection     UA    Results from last 7 days   Lab Units 12/21/24 2027   NITRITE UA  Negative   WBC UA /HPF 0-2   BACTERIA UA /HPF None Seen   SQUAM EPITHEL UA /HPF 7-12*     Radiology(recent) CT Abdomen Pelvis With Contrast    Result Date: 12/21/2024  Impression: 1.No acute abdominal or pelvic abnormality. 2.Status post cholecystectomy. 3.Extensive colonic diverticulosis. Electronically Signed: Alphonso Fagan MD  12/21/2024 10:31 PM EST  Workstation ID: [id number]       ASSESSMENT:  Elevated LFTs consider related to CBD stone  History of cholecystectomy  History of ascending cholangitis status post ERCP 2023  Hypertension  Hyperlipidemia      PLAN:  Patient is a 54-year-old female with past medical history as noted above who " presented to the ER with a complaint of right upper quadrant abdominal pain since 12/17/2024.  Nausea for 48 hours.  Patient was noted to have elevated LFTs.  CT shows CBD dilation 9 mm which could be physiologic post Farheen.    Will discuss potential ERCP with Dr. Ramirez.  This may be done today versus tomorrow.  Keep patient n.p.o. in the interim.  IV fluids for hydration.  Antibiotics due to history of ascending cholangitis with pus.  Supportive care in the interim.   Keep appointment for colonoscopy on January 9, 2025.  This is for screening purposes that she has never had 1.  Keep nurse practitioner appointment for later in January to go over results of hospitalization and colonoscopy.  Will order MRCP, autoimmune hepatitis workup and PBC labs.    I discussed the patient's findings and my recommendations with the patient.  Monserrat Chambers, APRN  12/22/24  08:23 EST    Time:

## 2024-12-22 NOTE — H&P
[facility] H&P    Patient Name: Idalia Oliver  : 1970  MRN: [id number]  Primary Care Physician: Kai Lea APRN  Date of admission: 2024     Patient Care Team:  Kai Lea APRN as PCP - General (Family Medicine)          Subjective   History Present Illness     Chief Complaint:   Chief Complaint   Patient presents with   • Abdominal Pain     Abdominal Pain     Abdominal Pain  Associated symptoms include nausea and vomiting.     ED 2024  54-year-old female with a history of ascending cholangitis and sepsis presents with increasing right upper quadrant pain Tuesday.  Patient states that she was last seen by GI back in September.  States her primary care doctor did some labs on the .  The patient reports that she has not had fever or chills but has had increasing pain.  She states does not appear to be related to oral intake.  She reports that she has had an increase in nausea over the last 48 hours.  Patient reports no abdominal distention.  She denies back pain    Observation 2024  Patient agrees with HPI noted above including abdominal pain with onset 5 days ago.  She describes as sharp and intermittent.  States that it is associated with nausea and frequent belching.  She currently rates pain 1/10.  GI following     Review of Systems   Gastrointestinal:  Positive for abdominal pain, nausea and vomiting.   All other systems reviewed and are negative.          Personal History     Past Medical History:   Past Medical History:   Diagnosis Date   • Hypertension    • Obesity        Surgical History:      Past Surgical History:   Procedure Laterality Date   • CHOLECYSTECTOMY     • DENTAL PROCEDURE     • ERCP N/A 10/6/2023    Procedure: ENDOSCOPIC RETROGRADE CHOLANGIOPANCREATOGRAPHYWITH BALLOON CLEAREANCE (12MM - 15MM BALLOON) UP TO 15MM OF BILE DUCT;  Surgeon: Valentin Campos MD;  Location: [facility] ENDOSCOPY;  Service: Gastroenterology;   Laterality: N/A;  CHOLANGITIS           Family History: family history includes Breast cancer (age of onset: 47) in her sister; COPD in her mother; Cancer in her father and sister; Diabetes in her father; Heart disease in her father; Hypertension in her father and mother; Stroke in her father. Otherwise pertinent FHx was reviewed and unremarkable.     Social History:  reports that she has never smoked. She has never been exposed to tobacco smoke. She has never used smokeless tobacco. She reports that she does not currently use alcohol. She reports that she does not use drugs.      Medications:  Prior to Admission medications    Medication Sig Start Date End Date Taking? Authorizing Provider   bisoprolol-hydrochlorothiazide (ZIAC) 10-6.25 MG per tablet Take 1 tablet by mouth Daily. 9/17/24  Yes Kai Lea APRN   Cholecalciferol (VITAMIN D3 PO) Take 2,000 Units by mouth Daily.   Yes Provider, MD Gloria   polyethylene glycol (MiraLax) 17 GM/SCOOP powder Take as directed per instructions for bowel prep 12/17/24 12/22/24     sodium-potassium-magnesium sulfates (SUPREP) 17.5-3.13-1.6 GM/177ML solution oral solution 5 PM the day before your scheduled colonoscopy - Take your 1st dose of bowel prep   5 AM - Take your 2nd dose of bowel prep   You may still have watery bowel movements after you finish drinking the solution. 10/29/24 12/22/24  Howard Isidro MD   sorbitol 70 % solution solution Take 100 ml by mouth as directed for 1 day 12/17/24 12/22/24         Allergies:  No Known Allergies    Objective   Objective     Vital Signs  Temp:  [97.9 °F (36.6 °C)-98.7 °F (37.1 °C)] 97.9 °F (36.6 °C)  Heart Rate:  [64-86] 64  Resp:  [17-18] 17  BP: (122-167)/(73-99) 138/80  SpO2:  [93 %-98 %] 96 %  on   ;   Device (Oxygen Therapy): room air  Body mass index is 43.86 kg/m².    Physical Exam  Vitals and nursing note reviewed.   Constitutional:       Appearance: Normal appearance. She is obese.   HENT:      Head:  Normocephalic and atraumatic.      Right Ear: External ear normal.      Left Ear: External ear normal.      Nose: Nose normal.      Mouth/Throat:      Mouth: Mucous membranes are moist.      Pharynx: Oropharynx is clear.   Eyes:      Extraocular Movements: Extraocular movements intact.   Cardiovascular:      Rate and Rhythm: Normal rate and regular rhythm.      Pulses: Normal pulses.      Heart sounds: Normal heart sounds.   Pulmonary:      Effort: Pulmonary effort is normal.      Breath sounds: Normal breath sounds.   Abdominal:      General: Abdomen is flat. Bowel sounds are normal.      Palpations: Abdomen is soft.   Musculoskeletal:         General: Normal range of motion.      Cervical back: Normal range of motion.   Skin:     General: Skin is warm.   Neurological:      General: No focal deficit present.      Mental Status: She is alert and oriented to person, place, and time.   Psychiatric:         Mood and Affect: Mood normal.         Behavior: Behavior normal.           Results Review:  I have personally reviewed most recent lab results and radiology images and interpretations and agree with findings, most notably: Lipase, CBC, CMP, UA, CT abdomen and pelvis.    Results from last 7 days   Lab Units 12/22/24  0611   WBC 10*3/mm3 5.34   HEMOGLOBIN g/dL 13.6   HEMATOCRIT % 41.3   PLATELETS 10*3/mm3 182     Results from last 7 days   Lab Units 12/22/24  0611 12/21/24  1949 12/21/24  1946   SODIUM mmol/L  --   --  142   POTASSIUM mmol/L  --   --  4.0   CHLORIDE mmol/L  --   --  104   CO2 mmol/L  --   --  26.8   BUN mg/dL  --   --  15   CREATININE mg/dL  --   --  0.88   GLUCOSE mg/dL  --   --  96   CALCIUM mg/dL  --   --  10.5   ALK PHOS U/L 385*  --  465*   ALT (SGPT) U/L 97*  --  126*   AST (SGOT) U/L 93*  --  122*   LACTATE mmol/L  --  0.7  --      Estimated Creatinine Clearance: 85.9 mL/min (by C-G formula based on SCr of 0.88 mg/dL).  Brief Urine Lab Results  (Last result in the past 365 days)        Color    Clarity   Blood   Leuk Est   Nitrite   Protein   CREAT   Urine HCG        12/21/24 2027 Dark Yellow   Cloudy   Small (1+)   Negative   Negative   Negative                   Microbiology Results (last 10 days)       ** No results found for the last 240 hours. **            ECG/EMG Results (most recent)       None                    CT Abdomen Pelvis With Contrast    Result Date: 12/21/2024  Impression: 1.No acute abdominal or pelvic abnormality. 2.Status post cholecystectomy. 3.Extensive colonic diverticulosis. Electronically Signed: Alphonso Fagan MD  12/21/2024 10:31 PM EST  Workstation ID: [id number]       Estimated Creatinine Clearance: 85.9 mL/min (by C-G formula based on SCr of 0.88 mg/dL).    Assessment & Plan   Assessment/Plan       Active Hospital Problems    Diagnosis  POA   • **Abdominal pain, acute, right upper quadrant [R10.11]  Yes      Resolved Hospital Problems   No resolved problems to display.       Right upper quadrant abdominal pain  -CMP showed elevated liver enzymes  -Alkaline phosphatase 385, AST 93, ALT 97  -CBC and lipase unremarkable  -Lactate 0.7  -C-reactive protein 1.32  -UA showed small hematuria but no bacteria or nitrites  -Blood cultures pending results  -CT abdomen and pelvis showed no acute abnormality and status postcholecystectomy  -GI consulted and ordered MRCP which is pending  -N.p.o.    Hypertension  -Moderately controlled   BP Readings from Last 1 Encounters:   12/22/24 138/80   - Continue Ziac  - Monitor while admitted     Obesity  -BMI 43.86  -Lifestyle modifications    VTE Prophylaxis - Documented VTE Prophylaxis Not Indicated  Reason:        Start        12/22/24 0027  VTE Prophylaxis Not Indicated: Low Risk; Obesity - BMI >30 (1)  Once                              CODE STATUS:    Code Status and Medical Interventions: CPR (Attempt to Resuscitate); Full Support   Ordered at: 12/22/24 0745     Level Of Support Discussed With:    Patient     Code Status (Patient has no pulse  and is not breathing):    CPR (Attempt to Resuscitate)     Medical Interventions (Patient has pulse or is breathing):    Full Support       This patient has been examined wearing personal protective equipment.     I discussed the patient's findings and my recommendations with patient and nursing staff.      Signature:Electronically signed by SANYA Tran, 12/22/24, 2:00 PM EST.

## 2024-12-22 NOTE — ED PROVIDER NOTES
Subjective   History of Present Illness  54-year-old female with a history of ascending cholangitis and sepsis presents with increasing right upper quadrant pain Tuesday.  Patient states that she was last seen by GI back in September.  States her primary care doctor did some labs on the 18th.  The patient reports that she has not had fever or chills but has had increasing pain.  She states does not appear to be related to oral intake.  She reports that she has had an increase in nausea over the last 48 hours.  Patient reports no abdominal distention.  She denies back pain      Review of Systems   Constitutional:  Positive for fatigue. Negative for chills and fever.   Gastrointestinal:  Positive for abdominal pain and nausea. Negative for abdominal distention.   7 pound weight loss    Past Medical History:   Diagnosis Date    Allergic     Hypertension     Obesity    Patient had ascending cholangitis status post ERCP in October 2023.  The patient has had negative hepatitis antigen panel.  The patient had distant laparoscopic cholecystectomy.    No Known Allergies    Past Surgical History:   Procedure Laterality Date    CHOLECYSTECTOMY      DENTAL PROCEDURE      ERCP N/A 10/6/2023    Procedure: ENDOSCOPIC RETROGRADE CHOLANGIOPANCREATOGRAPHYWITH BALLOON CLEAREANCE (12MM - 15MM BALLOON) UP TO 15MM OF BILE DUCT;  Surgeon: Valentin Campos MD;  Location: [facility] ENDOSCOPY;  Service: Gastroenterology;  Laterality: N/A;  CHOLANGITIS       Family History   Problem Relation Age of Onset    Hypertension Mother     COPD Mother     Hypertension Father     Heart disease Father     Stroke Father     Cancer Father         Prostate    Diabetes Father     Breast cancer Sister 47    Cancer Sister         Breast       Social History     Socioeconomic History    Marital status:    Tobacco Use    Smoking status: Never     Passive exposure: Never    Smokeless tobacco: Never   Vaping Use    Vaping status: Never Used    Substance and Sexual Activity    Alcohol use: Not Currently    Drug use: Never    Sexual activity: Defer       No recent unusual food water travel or activity.  Is a nurse    Objective   Physical Exam  Alert Lyerly Coma Scale 15   HEENT: Pupils equal and reactive to light. Conjunctivae are not injected. Normal tympanic membranes. Oropharynx and nares are normal.   Neck: Supple. Midline trachea. No JVD. No goiter.   Chest: Clear and equal breath sounds bilaterally, regular rate and rhythm without murmur or rub.   Abdomen: Positive bowel sounds, there is some right upper quadrant discomfort negative Costello sign, the abdomen is mildly obese but nondistended. No rebound or peritoneal signs. No CVA tenderness.   Extremities no clubbing. cyanosis or edema. Motor sensory exam is normal. The full range of motion is intact   Skin: Warm and dry, no rashes or petechia.   Lymphatic: No regional lymphadenopathy. No calf pain, swelling or Homans sign    Procedures           ED Course        Labs Reviewed   COMPREHENSIVE METABOLIC PANEL - Abnormal; Notable for the following components:       Result Value    ALT (SGPT) 126 (*)     AST (SGOT) 122 (*)     Alkaline Phosphatase 465 (*)     All other components within normal limits    Narrative:     GFR Categories in Chronic Kidney Disease (CKD)      GFR Category          GFR (mL/min/1.73)    Interpretation  G1                     90 or greater         Normal or high (1)  G2                      60-89                Mild decrease (1)  G3a                   45-59                Mild to moderate decrease  G3b                   30-44                Moderate to severe decrease  G4                    15-29                Severe decrease  G5                    14 or less           Kidney failure          (1)In the absence of evidence of kidney disease, neither GFR category G1 or G2 fulfill the criteria for CKD.    eGFR calculation 2021 CKD-EPI creatinine equation, which does not include  race as a factor   URINALYSIS W/ CULTURE IF INDICATED - Abnormal; Notable for the following components:    Color, UA Dark Yellow (*)     Appearance, UA Cloudy (*)     Ketones, UA Trace (*)     Blood, UA Small (1+) (*)     All other components within normal limits    Narrative:     In absence of clinical symptoms, the presence of pyuria, bacteria, and/or nitrites on the urinalysis result does not correlate with infection.   C-REACTIVE PROTEIN - Abnormal; Notable for the following components:    C-Reactive Protein 1.32 (*)     All other components within normal limits   CBC WITH AUTO DIFFERENTIAL - Abnormal; Notable for the following components:    Hematocrit 47.2 (*)     RDW 12.2 (*)     All other components within normal limits   URINALYSIS, MICROSCOPIC ONLY - Abnormal; Notable for the following components:    RBC, UA 6-10 (*)     Squamous Epithelial Cells, UA 7-12 (*)     All other components within normal limits   LIPASE - Normal   POC LACTATE - Normal   BLOOD CULTURE   BLOOD CULTURE   CBC AND DIFFERENTIAL    Narrative:     The following orders were created for panel order CBC & Differential.  Procedure                               Abnormality         Status                     ---------                               -----------         ------                     CBC Auto Differential[[id number]]        Abnormal            Final result                 Please view results for these tests on the individual orders.     .EDS  CT Abdomen Pelvis With Contrast    Result Date: 12/21/2024  Impression: 1.No acute abdominal or pelvic abnormality. 2.Status post cholecystectomy. 3.Extensive colonic diverticulosis. Electronically Signed: Alphonso Fagan MD  12/21/2024 10:31 PM EST  Workstation ID: [id number]                                                  Medical Decision Making  Pain and nausea were relieved in the emergency department patient was hydrated liver functions today were elevated with ALT being 126 on the 18th it was  97 3 months ago was 58 AST was 122 today on the 18th he was 89 and 3 months ago it was 54 alk phos was 465 today 3 days ago was 432 and 3 months ago to 64 hemoglobin is stable in the last 3 days white count is gone from 5.1-7.94.  The patient will be hydrated overnight and pain and nausea controlled parenterally should be kept n.p.o. after midnight we will obtain GI consultation in the morning the patient was agreeable with this plan of treatment    Amount and/or Complexity of Data Reviewed  Independent Historian: spouse  Labs: ordered. Decision-making details documented in ED Course.  Radiology: ordered and independent interpretation performed.    Risk  OTC drugs.  Prescription drug management.  Parenteral controlled substances.  Decision regarding hospitalization.        Final diagnoses:   Right upper quadrant pain   Nonspecific abnormal results of liver function study   History of cholangitis       ED Disposition  ED Disposition       ED Disposition   Decision to Admit    Condition   --    Comment   --               No follow-up provider specified.       Medication List      No changes were made to your prescriptions during this visit.            Venu Leary MD  12/21/24 6707

## 2024-12-22 NOTE — PLAN OF CARE
Goal Outcome Evaluation:  Plan of Care Reviewed With: patient        Progress: improving          Consent signed for possible EGD tomorrow with Dr. Anthony. NPO at midnight.

## 2024-12-23 ENCOUNTER — READMISSION MANAGEMENT (OUTPATIENT)
Dept: CALL CENTER | Facility: HOSPITAL | Age: 54
End: 2024-12-23
Payer: COMMERCIAL

## 2024-12-23 ENCOUNTER — ANESTHESIA (OUTPATIENT)
Dept: GASTROENTEROLOGY | Facility: HOSPITAL | Age: 54
End: 2024-12-23
Payer: COMMERCIAL

## 2024-12-23 ENCOUNTER — ANESTHESIA EVENT (OUTPATIENT)
Dept: GASTROENTEROLOGY | Facility: HOSPITAL | Age: 54
End: 2024-12-23
Payer: COMMERCIAL

## 2024-12-23 ENCOUNTER — APPOINTMENT (OUTPATIENT)
Dept: GENERAL RADIOLOGY | Facility: HOSPITAL | Age: 54
End: 2024-12-23
Payer: COMMERCIAL

## 2024-12-23 ENCOUNTER — ON CAMPUS - OUTPATIENT (OUTPATIENT)
Dept: URBAN - METROPOLITAN AREA HOSPITAL 85 | Facility: HOSPITAL | Age: 54
End: 2024-12-23
Payer: COMMERCIAL

## 2024-12-23 VITALS
WEIGHT: 247.8 LBS | HEIGHT: 63 IN | DIASTOLIC BLOOD PRESSURE: 65 MMHG | OXYGEN SATURATION: 95 % | RESPIRATION RATE: 16 BRPM | SYSTOLIC BLOOD PRESSURE: 120 MMHG | BODY MASS INDEX: 43.91 KG/M2 | HEART RATE: 67 BPM | TEMPERATURE: 97.3 F

## 2024-12-23 DIAGNOSIS — K83.8 OTHER SPECIFIED DISEASES OF BILIARY TRACT: ICD-10-CM

## 2024-12-23 DIAGNOSIS — Z90.49 ACQUIRED ABSENCE OF OTHER SPECIFIED PARTS OF DIGESTIVE TRACT: ICD-10-CM

## 2024-12-23 LAB
ALBUMIN SERPL-MCNC: 3.6 G/DL (ref 3.5–5.2)
ALP SERPL-CCNC: 381 U/L (ref 39–117)
ALT SERPL W P-5'-P-CCNC: 96 U/L (ref 1–33)
ANA SER QL: NEGATIVE
ANION GAP SERPL CALCULATED.3IONS-SCNC: 12.2 MMOL/L (ref 5–15)
AST SERPL-CCNC: 99 U/L (ref 1–32)
BASOPHILS # BLD AUTO: 0.06 10*3/MM3 (ref 0–0.2)
BASOPHILS NFR BLD AUTO: 1.2 % (ref 0–1.5)
BILIRUB CONJ SERPL-MCNC: 0.5 MG/DL (ref 0–0.3)
BILIRUB INDIRECT SERPL-MCNC: 0.9 MG/DL
BILIRUB SERPL-MCNC: 1.4 MG/DL (ref 0–1.2)
BUN SERPL-MCNC: 11 MG/DL (ref 6–20)
BUN/CREAT SERPL: 12.8 (ref 7–25)
CALCIUM SPEC-SCNC: 9.6 MG/DL (ref 8.6–10.5)
CHLORIDE SERPL-SCNC: 105 MMOL/L (ref 98–107)
CO2 SERPL-SCNC: 21.8 MMOL/L (ref 22–29)
CREAT SERPL-MCNC: 0.86 MG/DL (ref 0.57–1)
DEPRECATED RDW RBC AUTO: 42.3 FL (ref 37–54)
EGFRCR SERPLBLD CKD-EPI 2021: 79.4 ML/MIN/1.73
EOSINOPHIL # BLD AUTO: 0.09 10*3/MM3 (ref 0–0.4)
EOSINOPHIL NFR BLD AUTO: 1.8 % (ref 0.3–6.2)
ERYTHROCYTE [DISTWIDTH] IN BLOOD BY AUTOMATED COUNT: 12.3 % (ref 12.3–15.4)
GLUCOSE SERPL-MCNC: 88 MG/DL (ref 65–99)
HCT VFR BLD AUTO: 43.6 % (ref 34–46.6)
HGB BLD-MCNC: 13.9 G/DL (ref 12–15.9)
IMM GRANULOCYTES # BLD AUTO: 0.02 10*3/MM3 (ref 0–0.05)
IMM GRANULOCYTES NFR BLD AUTO: 0.4 % (ref 0–0.5)
LYMPHOCYTES # BLD AUTO: 1.38 10*3/MM3 (ref 0.7–3.1)
LYMPHOCYTES NFR BLD AUTO: 27.3 % (ref 19.6–45.3)
MCH RBC QN AUTO: 29.8 PG (ref 26.6–33)
MCHC RBC AUTO-ENTMCNC: 31.9 G/DL (ref 31.5–35.7)
MCV RBC AUTO: 93.6 FL (ref 79–97)
MONOCYTES # BLD AUTO: 0.33 10*3/MM3 (ref 0.1–0.9)
MONOCYTES NFR BLD AUTO: 6.5 % (ref 5–12)
NEUTROPHILS NFR BLD AUTO: 3.18 10*3/MM3 (ref 1.7–7)
NEUTROPHILS NFR BLD AUTO: 62.8 % (ref 42.7–76)
NRBC BLD AUTO-RTO: 0 /100 WBC (ref 0–0.2)
PLATELET # BLD AUTO: 162 10*3/MM3 (ref 140–450)
PMV BLD AUTO: 11.6 FL (ref 6–12)
POTASSIUM SERPL-SCNC: 3.7 MMOL/L (ref 3.5–5.2)
PROT SERPL-MCNC: 6.8 G/DL (ref 6–8.5)
RBC # BLD AUTO: 4.66 10*6/MM3 (ref 3.77–5.28)
SODIUM SERPL-SCNC: 139 MMOL/L (ref 136–145)
WBC NRBC COR # BLD AUTO: 5.06 10*3/MM3 (ref 3.4–10.8)

## 2024-12-23 PROCEDURE — 25510000001 IOPAMIDOL 61 % SOLUTION 30 ML VIAL: Performed by: INTERNAL MEDICINE

## 2024-12-23 PROCEDURE — G0378 HOSPITAL OBSERVATION PER HR: HCPCS

## 2024-12-23 PROCEDURE — 25010000002 PIPERACILLIN SOD-TAZOBACTAM PER 1 G: Performed by: NURSE PRACTITIONER

## 2024-12-23 PROCEDURE — 25010000002 SUGAMMADEX 200 MG/2ML SOLUTION: Performed by: NURSE ANESTHETIST, CERTIFIED REGISTERED

## 2024-12-23 PROCEDURE — 43260 ERCP W/SPECIMEN COLLECTION: CPT | Performed by: INTERNAL MEDICINE

## 2024-12-23 PROCEDURE — 85025 COMPLETE CBC W/AUTO DIFF WBC: CPT | Performed by: NURSE PRACTITIONER

## 2024-12-23 PROCEDURE — 80076 HEPATIC FUNCTION PANEL: CPT | Performed by: INTERNAL MEDICINE

## 2024-12-23 PROCEDURE — 25010000002 SUCCINYLCHOLINE PER 20 MG: Performed by: NURSE ANESTHETIST, CERTIFIED REGISTERED

## 2024-12-23 PROCEDURE — C1769 GUIDE WIRE: HCPCS | Performed by: INTERNAL MEDICINE

## 2024-12-23 PROCEDURE — 25010000002 FENTANYL CITRATE (PF) 100 MCG/2ML SOLUTION: Performed by: NURSE ANESTHETIST, CERTIFIED REGISTERED

## 2024-12-23 PROCEDURE — 25010000002 PROPOFOL 200 MG/20ML EMULSION: Performed by: NURSE ANESTHETIST, CERTIFIED REGISTERED

## 2024-12-23 PROCEDURE — 25810000003 SODIUM CHLORIDE 0.9 % SOLUTION: Performed by: NURSE ANESTHETIST, CERTIFIED REGISTERED

## 2024-12-23 PROCEDURE — 74328 X-RAY BILE DUCT ENDOSCOPY: CPT

## 2024-12-23 PROCEDURE — 25010000002 DEXAMETHASONE PER 1 MG: Performed by: NURSE ANESTHETIST, CERTIFIED REGISTERED

## 2024-12-23 PROCEDURE — 80048 BASIC METABOLIC PNL TOTAL CA: CPT | Performed by: NURSE PRACTITIONER

## 2024-12-23 RX ORDER — ROCURONIUM BROMIDE 10 MG/ML
INJECTION, SOLUTION INTRAVENOUS AS NEEDED
Status: DISCONTINUED | OUTPATIENT
Start: 2024-12-23 | End: 2024-12-23 | Stop reason: SURG

## 2024-12-23 RX ORDER — ONDANSETRON 2 MG/ML
4 INJECTION INTRAMUSCULAR; INTRAVENOUS EVERY 6 HOURS PRN
Status: DISCONTINUED | OUTPATIENT
Start: 2024-12-23 | End: 2024-12-23 | Stop reason: HOSPADM

## 2024-12-23 RX ORDER — URSODIOL 300 MG/1
300 CAPSULE ORAL 2 TIMES DAILY
Qty: 60 CAPSULE | Refills: 2 | Status: SHIPPED | OUTPATIENT
Start: 2024-12-23 | End: 2025-03-23

## 2024-12-23 RX ORDER — DEXAMETHASONE SODIUM PHOSPHATE 4 MG/ML
INJECTION, SOLUTION INTRA-ARTICULAR; INTRALESIONAL; INTRAMUSCULAR; INTRAVENOUS; SOFT TISSUE AS NEEDED
Status: DISCONTINUED | OUTPATIENT
Start: 2024-12-23 | End: 2024-12-23 | Stop reason: SURG

## 2024-12-23 RX ORDER — SODIUM CHLORIDE 0.9 % (FLUSH) 0.9 %
10 SYRINGE (ML) INJECTION AS NEEDED
Status: CANCELLED | OUTPATIENT
Start: 2024-12-23

## 2024-12-23 RX ORDER — MEPERIDINE HYDROCHLORIDE 25 MG/ML
12.5 INJECTION INTRAMUSCULAR; INTRAVENOUS; SUBCUTANEOUS
Status: DISCONTINUED | OUTPATIENT
Start: 2024-12-23 | End: 2024-12-23

## 2024-12-23 RX ORDER — EPHEDRINE SULFATE 5 MG/ML
5 INJECTION INTRAVENOUS ONCE AS NEEDED
Status: DISCONTINUED | OUTPATIENT
Start: 2024-12-23 | End: 2024-12-23

## 2024-12-23 RX ORDER — ONDANSETRON 2 MG/ML
4 INJECTION INTRAMUSCULAR; INTRAVENOUS ONCE AS NEEDED
Status: DISCONTINUED | OUTPATIENT
Start: 2024-12-23 | End: 2024-12-23

## 2024-12-23 RX ORDER — HYDRALAZINE HYDROCHLORIDE 20 MG/ML
5 INJECTION INTRAMUSCULAR; INTRAVENOUS
Status: DISCONTINUED | OUTPATIENT
Start: 2024-12-23 | End: 2024-12-23

## 2024-12-23 RX ORDER — LABETALOL HYDROCHLORIDE 5 MG/ML
5 INJECTION, SOLUTION INTRAVENOUS
Status: DISCONTINUED | OUTPATIENT
Start: 2024-12-23 | End: 2024-12-23

## 2024-12-23 RX ORDER — URSODIOL 300 MG/1
300 CAPSULE ORAL 2 TIMES DAILY
Status: DISCONTINUED | OUTPATIENT
Start: 2024-12-23 | End: 2024-12-23 | Stop reason: HOSPADM

## 2024-12-23 RX ORDER — SODIUM CHLORIDE 9 MG/ML
INJECTION, SOLUTION INTRAVENOUS CONTINUOUS PRN
Status: DISCONTINUED | OUTPATIENT
Start: 2024-12-23 | End: 2024-12-23 | Stop reason: SURG

## 2024-12-23 RX ORDER — SUCCINYLCHOLINE CHLORIDE 20 MG/ML
INJECTION INTRAMUSCULAR; INTRAVENOUS AS NEEDED
Status: DISCONTINUED | OUTPATIENT
Start: 2024-12-23 | End: 2024-12-23 | Stop reason: SURG

## 2024-12-23 RX ORDER — SODIUM CHLORIDE 9 MG/ML
9 INJECTION, SOLUTION INTRAVENOUS CONTINUOUS PRN
Status: CANCELLED | OUTPATIENT
Start: 2024-12-23 | End: 2024-12-24

## 2024-12-23 RX ORDER — IPRATROPIUM BROMIDE AND ALBUTEROL SULFATE 2.5; .5 MG/3ML; MG/3ML
3 SOLUTION RESPIRATORY (INHALATION) ONCE AS NEEDED
Status: DISCONTINUED | OUTPATIENT
Start: 2024-12-23 | End: 2024-12-23

## 2024-12-23 RX ORDER — ONDANSETRON 4 MG/1
4 TABLET, ORALLY DISINTEGRATING ORAL EVERY 6 HOURS PRN
Status: DISCONTINUED | OUTPATIENT
Start: 2024-12-23 | End: 2024-12-23 | Stop reason: HOSPADM

## 2024-12-23 RX ORDER — FENTANYL CITRATE 50 UG/ML
INJECTION, SOLUTION INTRAMUSCULAR; INTRAVENOUS AS NEEDED
Status: DISCONTINUED | OUTPATIENT
Start: 2024-12-23 | End: 2024-12-23 | Stop reason: SURG

## 2024-12-23 RX ORDER — PROPOFOL 10 MG/ML
INJECTION, EMULSION INTRAVENOUS AS NEEDED
Status: DISCONTINUED | OUTPATIENT
Start: 2024-12-23 | End: 2024-12-23 | Stop reason: SURG

## 2024-12-23 RX ORDER — SODIUM CHLORIDE 0.9 % (FLUSH) 0.9 %
10 SYRINGE (ML) INJECTION EVERY 12 HOURS SCHEDULED
Status: CANCELLED | OUTPATIENT
Start: 2024-12-23

## 2024-12-23 RX ORDER — INDOMETHACIN 100 MG
SUPPOSITORY, RECTAL RECTAL AS NEEDED
Status: DISCONTINUED | OUTPATIENT
Start: 2024-12-23 | End: 2024-12-23 | Stop reason: HOSPADM

## 2024-12-23 RX ADMIN — BISOPROLOL FUMARATE 10 MG: 5 TABLET ORAL at 09:32

## 2024-12-23 RX ADMIN — SUGAMMADEX 200 MG: 100 INJECTION, SOLUTION INTRAVENOUS at 15:08

## 2024-12-23 RX ADMIN — SUCCINYLCHOLINE CHLORIDE 140 MG: 20 INJECTION, SOLUTION INTRAMUSCULAR; INTRAVENOUS at 14:38

## 2024-12-23 RX ADMIN — Medication 10 ML: at 09:32

## 2024-12-23 RX ADMIN — SODIUM CHLORIDE: 9 INJECTION, SOLUTION INTRAVENOUS at 14:32

## 2024-12-23 RX ADMIN — HYDROCHLOROTHIAZIDE 6.25 MG: 12.5 TABLET ORAL at 09:32

## 2024-12-23 RX ADMIN — ROCURONIUM BROMIDE 20 MG: 10 INJECTION, SOLUTION INTRAVENOUS at 14:54

## 2024-12-23 RX ADMIN — SODIUM CHLORIDE 100 ML/HR: 4.5 INJECTION, SOLUTION INTRAVENOUS at 04:16

## 2024-12-23 RX ADMIN — FENTANYL CITRATE 50 MCG: 50 INJECTION, SOLUTION INTRAMUSCULAR; INTRAVENOUS at 15:07

## 2024-12-23 RX ADMIN — ROCURONIUM BROMIDE 5 MG: 10 INJECTION, SOLUTION INTRAVENOUS at 14:38

## 2024-12-23 RX ADMIN — PROPOFOL 200 MG: 10 INJECTION, EMULSION INTRAVENOUS at 14:38

## 2024-12-23 RX ADMIN — DEXAMETHASONE SODIUM PHOSPHATE 4 MG: 4 INJECTION, SOLUTION INTRAMUSCULAR; INTRAVENOUS at 14:51

## 2024-12-23 RX ADMIN — PIPERACILLIN AND TAZOBACTAM 4.5 G: 4; .5 INJECTION, POWDER, FOR SOLUTION INTRAVENOUS at 09:33

## 2024-12-23 RX ADMIN — ROCURONIUM BROMIDE 25 MG: 10 INJECTION, SOLUTION INTRAVENOUS at 14:43

## 2024-12-23 NOTE — PLAN OF CARE
Problem: Adult Inpatient Plan of Care  Goal: Plan of Care Review  Outcome: Progressing  Flowsheets (Taken 12/23/2024 0559)  Progress: improving  Goal: Patient-Specific Goal (Individualized)  Outcome: Progressing  Goal: Absence of Hospital-Acquired Illness or Injury  Outcome: Progressing  Intervention: Identify and Manage Fall Risk  Recent Flowsheet Documentation  Taken 12/23/2024 0430 by Christina Mendoza RN  Safety Promotion/Fall Prevention: safety round/check completed  Taken 12/23/2024 0207 by Christina Mendoza RN  Safety Promotion/Fall Prevention: safety round/check completed  Taken 12/23/2024 0119 by Christina Mendoza RN  Safety Promotion/Fall Prevention: safety round/check completed  Taken 12/23/2024 0029 by Christina Mendoza RN  Safety Promotion/Fall Prevention: safety round/check completed  Taken 12/22/2024 2324 by Christina Mendoza RN  Safety Promotion/Fall Prevention: safety round/check completed  Taken 12/22/2024 2219 by Christina Mendoza RN  Safety Promotion/Fall Prevention: safety round/check completed  Taken 12/22/2024 2137 by Christina Mendoza RN  Safety Promotion/Fall Prevention: safety round/check completed  Goal: Optimal Comfort and Wellbeing  Outcome: Progressing  Goal: Readiness for Transition of Care  Outcome: Progressing     Problem: Pain Acute  Goal: Optimal Pain Control and Function  Outcome: Progressing   Goal Outcome Evaluation:           Progress: improving

## 2024-12-23 NOTE — ANESTHESIA PROCEDURE NOTES
Airway  Date/Time: 12/23/2024 2:40 PM  Airway not difficult    General Information and Staff    Patient location during procedure: OR    Indications and Patient Condition  Indications for airway management: airway protection    Preoxygenated: yes  Mask difficulty assessment: 1 - vent by mask    Final Airway Details  Final airway type: endotracheal airway      Successful airway: ETT  Cuffed: yes   Successful intubation technique: direct laryngoscopy  Facilitating devices/methods: intubating stylet  Endotracheal tube insertion site: oral  Blade: You  Blade size: 3  ETT size (mm): 7.5  Cormack-Lehane Classification: grade I - full view of glottis  Placement verified by: chest auscultation and capnometry   Cuff volume (mL): 8  Measured from: gums  ETT/EBT to gums (cm): 22  Number of attempts at approach: 1  Assessment: lips, teeth, and gum same as pre-op

## 2024-12-23 NOTE — ANESTHESIA PREPROCEDURE EVALUATION
Anesthesia Evaluation     Patient summary reviewed and Nursing notes reviewed   no history of anesthetic complications:   NPO Solid Status: > 8 hours  NPO Liquid Status: > 2 hours           Airway   Dental      Pulmonary    Cardiovascular     ECG reviewed  Patient on routine beta blocker    (+) hypertension      Neuro/Psych  GI/Hepatic/Renal/Endo    (+) morbid obesity, liver disease history of elevated LFT    Musculoskeletal     Abdominal    Substance History      OB/GYN          Other        ROS/Med Hx Other: Additional History:  Elevated bilirubin, abd pain, bacteremia, septic shock    PSH:  CHOLECYSTECTOMY DENTAL PROCEDURE  ERCP               Anesthesia Plan    ASA 3     general     (Patient identified; pre-operative vital signs, all relevant labs/studies, complete medical/surgical/anesthetic history, full medication list, full allergy list, and NPO status obtained/reviewed; physical assessment performed; anesthetic options, side effects, potential complications, risks, and benefits discussed; questions answered; written anesthesia consent obtained; patient cleared for procedure; anesthesia machine and equipment checked and functioning)  intravenous induction     Anesthetic plan, risks, benefits, and alternatives have been provided, discussed and informed consent has been obtained with: patient.    Plan discussed with CRNA and CAA.    CODE STATUS:    Level Of Support Discussed With: Patient  Code Status (Patient has no pulse and is not breathing): CPR (Attempt to Resuscitate)  Medical Interventions (Patient has pulse or is breathing): Full Support

## 2024-12-23 NOTE — OP NOTE
ENDOSCOPIC RETROGRADE CHOLANGIOPANCREATOGRAPHY Procedure Report    Patient Name:  Idalia Oliver  YOB: 1970    Date of Surgery:  12/23/2024     Preoperative diagnosis:  Right upper quadrant pain  Elevated liver enzymes  Personal history of cholangitis    Postoperative diagnosis:  Dilated bile duct  Previous cholecystectomy  Previous biliary sphincterotomy           Procedure(s):  ENDOSCOPIC RETROGRADE CHOLANGIOPANCREATOGRAPHY with balloon clearance of common bile duct     Staff:  Surgeon(s):  Valentin Campos MD      Anesthesia: General  Indocin suppository 100 mg    DNR status: Patient wishes full code during the procedure    Implants:    Nothing was implanted during the procedure    Specimen:        See Below    Estimated blood loss: Minimal     Complications:  None    Description of Procedure:  Informed consent was obtained for the procedure, including sedation.  Risks of perforation, hemorrhage, adverse drug reaction and aspiration and pancreatitis were discussed.    The patient was brought into the endoscopy suite. Continuous cardiopulmonary monitoring was performed.  After general anesthesia was administered and endotracheal intubation was achieved, the bite block was inserted into the patient's mouth. The patient was placed in the right semiprone position on the fluoroscopy table. Indocin suppository was inserted into the patient's rectum for prophylaxis.   A  film was obtained which normal.  The duodenoscope was inserted into the patient's mouth and advanced to the second portion of the duodenum without difficulty.   Limited examination of the patient's esophagus, stomach, and duodenum were performed and were normal.  The major papilla and to direct visualization, and it appeared unremarkable status post sphincterotomy.  The major ampulla was engaged with the extraction balloon catheter and wire in the bile duct was selectively cannulated with ease.  Cholangiogram showed normal  intrahepatic ducts with a dilated extrahepatic duct up to 15 mm without definite filling defect or stricture and evidence of previous cholecystectomy.  The 15 mm extraction balloon catheter was swept through both left and right intrahepatic ducts and out into the duodenum.  There were no stone, debris, or other abnormalities.  There was good flow of bile.  I elected not to place a stent.    On completion of the exam, the bowel was decompressed, the scope was removed from the patient, the patient tolerated the procedure well, there were no immediate post-operative complications.  The pancreatic duct was not investigated since it was not the duct of interest.        Impression:  ERCP showed dilated extrahepatic duct status post cholecystectomy and sphincterotomy, but no stones or sludge identified.    Recommendations:  Follow-up on pending liver serology  Monitor for postop complications  Low-fat diet  Ursodiol 300 mg p.o. twice daily  Discontinue antibiotics  Avoid hepatotoxic medications  Follow-up with GI provider in the office in 1 month  Okay for discharge from GI standpoint    We appreciate the referral    Electronically signed by Valentin Campos MD, 12/23/24, 3:19 PM EST.

## 2024-12-23 NOTE — ANESTHESIA POSTPROCEDURE EVALUATION
Patient: Idalia Oliver    Procedure Summary       Date: 12/23/24 Room / Location: [facility] ENDOSCOPY 2 / [facility] ENDOSCOPY    Anesthesia Start: 1432 Anesthesia Stop: 1514    Procedure: ENDOSCOPIC RETROGRADE CHOLANGIOPANCREATOGRAPHY with balloon clearance of common bile duct Diagnosis:     Surgeons: Valentin Campos MD Provider: Venu Cooper MD    Anesthesia Type: general ASA Status: 3            Anesthesia Type: general    Vitals  Vitals Value Taken Time   /53 12/23/24 1549   Temp 98.3 °F (36.8 °C) 12/23/24 1523   Pulse 73 12/23/24 1551   Resp 16 12/23/24 1549   SpO2 97 % 12/23/24 1551   Vitals shown include unfiled device data.        Post Anesthesia Care and Evaluation    Patient location during evaluation: PACU  Patient participation: complete - patient participated  Level of consciousness: awake  Pain scale: See nurse's notes for pain score.  Pain management: adequate    Airway patency: patent  Anesthetic complications: No anesthetic complications  PONV Status: none  Cardiovascular status: acceptable  Respiratory status: acceptable and spontaneous ventilation  Hydration status: acceptable    Comments: Patient seen and examined postoperatively; vital signs stable; SpO2 greater than or equal to 90%; cardiopulmonary status stable; nausea/vomiting adequately controlled; pain adequately controlled; no apparent anesthesia complications; patient discharged from anesthesia care when discharge criteria were met

## 2024-12-23 NOTE — CASE MANAGEMENT/SOCIAL WORK
Discharge Planning Assessment   [place]     Patient Name: Idalia Oliver  MRN: [id number]  Today's Date: 12/23/2024    Admit Date: 12/21/2024    Plan: From home with spouse   Discharge Needs Assessment       Row Name 12/23/24 1158       Living Environment    People in Home spouse    Name(s) of People in Home Spouse Romie    Current Living Arrangements home    Potentially Unsafe Housing Conditions none    In the past 12 months has the electric, gas, oil, or water company threatened to shut off services in your home? No    Primary Care Provided by self    Provides Primary Care For no one    Family Caregiver if Needed spouse    Family Caregiver Names Spouse Romie    Quality of Family Relationships helpful;involved;supportive    Able to Return to Prior Arrangements yes       Resource/Environmental Concerns    Resource/Environmental Concerns none    Transportation Concerns none       Transportation Needs    In the past 12 months, has lack of transportation kept you from medical appointments or from getting medications? no    In the past 12 months, has lack of transportation kept you from meetings, work, or from getting things needed for daily living? No       Food Insecurity    Within the past 12 months, you worried that your food would run out before you got the money to buy more. Never true    Within the past 12 months, the food you bought just didn't last and you didn't have money to get more. Never true       Transition Planning    Patient/Family Anticipates Transition to home with family    Patient/Family Anticipated Services at Transition none    Transportation Anticipated car, drives self;family or friend will provide       Discharge Needs Assessment    Readmission Within the Last 30 Days no previous admission in last 30 days    Equipment Currently Used at Home none    Concerns to be Addressed no discharge needs identified;denies needs/concerns at this time    Do you want help finding or keeping work or a job? I do  not need or want help    Do you want help with school or training? For example, starting or completing job training or getting a high school diploma, GED or equivalent No    Anticipated Changes Related to Illness none    Equipment Needed After Discharge none    Provided Post Acute Provider List? N/A    Provided Post Acute Provider Quality & Resource List? N/A    Offered/Gave Vendor List no                   Discharge Plan       Row Name 12/23/24 0913       Plan    Plan From home with spouse    Patient/Family in Agreement with Plan yes    Provided Post Acute Provider List? N/A    Provided Post Acute Provider Quality & Resource List? N/A    Plan Comments CM met with patient and spouse Romie at the bedside to review discharge planning. Patient lives at home with spouse, is IADLs and drives. Pt plans to transport herself at d/c. Confirmed PCP, insurance, and pharmacy. Patient accepts [facility]. Patient denies any difficulty affording food, utilities, or medications. Patient is not current with any HHC/OPPT/OT services. Pt and spouse had concerns over how [place] [employer] would bill the OBS stay and CM instructed them to call their insurance to obtain more info. DC Barriers: GI following, IV Dilauded, IV abx, Possible EGD?                  Continued Care and Services - Admitted Since 12/21/2024    No active coordination exists for this encounter.       Expected Discharge Date and Time       Expected Discharge Date Expected Discharge Time    Dec 25, 2024            Demographic Summary       Row Name 12/23/24 115       General Information    Admission Type observation    Arrived From emergency department    Required Notices Provided Observation Status Notice    Referral Source admission list    Reason for Consult discharge planning    Preferred Language English       Contact Information    Permission Granted to Share Info With     Contact Information Obtained for                    Functional Status       Row  Name 12/23/24 1158       Functional Status    Usual Activity Tolerance good    Current Activity Tolerance good       Functional Status, IADL    Medications independent    Meal Preparation independent    Housekeeping independent    Laundry independent    Shopping independent    If for any reason you need help with day-to-day activities such as bathing, preparing meals, shopping, managing finances, etc., do you get the help you need? I don't need any help       Mental Status    General Appearance WDL WDL       Mental Status Summary    Recent Changes in Mental Status/Cognitive Functioning no changes                Radha Judge RN     [phone]  [email or web address]

## 2024-12-23 NOTE — DISCHARGE SUMMARY
[facility]    Kai Lea APRN    CHIEF COMPLAINT:     Abdominal pain     HISTORY OF PRESENT ILLNESS:    Abdominal Pain        ED 12/21/2024  54-year-old female with a history of ascending cholangitis and sepsis presents with increasing right upper quadrant pain Tuesday.  Patient states that she was last seen by GI back in September.  States her primary care doctor did some labs on the 18th.  The patient reports that she has not had fever or chills but has had increasing pain.  She states does not appear to be related to oral intake.  She reports that she has had an increase in nausea over the last 48 hours.  Patient reports no abdominal distention.  She denies back pain     Observation 12/22/2024  Patient agrees with HPI noted above including abdominal pain with onset 5 days ago.  She describes as sharp and intermittent.  States that it is associated with nausea and frequent belching.  She currently rates pain 1/10.  GI following     Observation 12/23/2024  Seen this morning and denied any acute distress was complaints. Eager for discharge. Just had ERCP done and cleared by GI to [facility] today.     Past Medical History:   Diagnosis Date    Hypertension     Obesity      Past Surgical History:   Procedure Laterality Date    CHOLECYSTECTOMY      DENTAL PROCEDURE      ERCP N/A 10/6/2023    Procedure: ENDOSCOPIC RETROGRADE CHOLANGIOPANCREATOGRAPHYWITH BALLOON CLEAREANCE (12MM - 15MM BALLOON) UP TO 15MM OF BILE DUCT;  Surgeon: Valentin Campos MD;  Location: [facility] ENDOSCOPY;  Service: Gastroenterology;  Laterality: N/A;  CHOLANGITIS     Family History   Problem Relation Age of Onset    Hypertension Mother     COPD Mother     Hypertension Father     Heart disease Father     Stroke Father     Cancer Father         Prostate    Diabetes Father     Breast cancer Sister 47    Cancer Sister         Breast     Social History     Tobacco Use    Smoking status: Never     Passive exposure:  Never    Smokeless tobacco: Never   Vaping Use    Vaping status: Never Used   Substance Use Topics    Alcohol use: Not Currently    Drug use: Never     Medications Prior to Admission   Medication Sig Dispense Refill Last Dose/Taking    bisoprolol-hydrochlorothiazide (ZIAC) 10-6.25 MG per tablet Take 1 tablet by mouth Daily. 90 tablet 1 12/21/2024 at  7:00 AM    Cholecalciferol (VITAMIN D3 PO) Take 2,000 Units by mouth Daily.   12/21/2024     Allergies:  Patient has no known allergies.    Immunization History   Administered Date(s) Administered    COVID-19 (IVY) 03/17/2021    COVID-19 (MODERNA) 1st,2nd,3rd Dose Monovalent 09/01/2022    Flu Vaccine Intradermal Quad 18-64YR 01/29/2013, 10/20/2014    Flublock Quad =>18yrs 12/01/2020    Fluzone  >6mos 10/01/2024    Hepatitis B Adult/Adolescent IM 03/07/2014, 04/07/2014, 09/08/2014    Influenza Injectable Mdck Pf Quad 10/25/2023    Influenza Seasonal Injectable 01/29/2013, 10/20/2014    PPD Test 02/20/2014, 03/09/2020    Tdap 09/17/2024           REVIEW OF SYSTEMS:    Review of Systems   Gastrointestinal:  Positive for abdominal pain.       Review of Systems   Gastrointestinal:  Positive for abdominal pain, nausea and vomiting.   All other systems reviewed and are negative.       Vital Signs  Temp:  [97.5 °F (36.4 °C)-98.3 °F (36.8 °C)] 98.3 °F (36.8 °C)  Heart Rate:  [68-86] 73  Resp:  [13-20] 14  BP: (117-149)/(53-86) 120/60          Physical Exam:  Physical Exam  Vitals and nursing note reviewed.   Constitutional:       Appearance: Normal appearance. She is obese.   HENT:      Head: Normocephalic and atraumatic.      Right Ear: External ear normal.      Left Ear: External ear normal.      Nose: Nose normal.      Mouth/Throat:      Mouth: Mucous membranes are moist.      Pharynx: Oropharynx is clear.   Eyes:      Extraocular Movements: Extraocular movements intact.   Cardiovascular:      Rate and Rhythm: Normal rate and regular rhythm.      Pulses: Normal pulses.       Heart sounds: Normal heart sounds.   Pulmonary:      Effort: Pulmonary effort is normal.      Breath sounds: Normal breath sounds.   Abdominal:      General: Abdomen is flat. Bowel sounds are normal.      Palpations: Abdomen is soft.   Musculoskeletal:         General: Normal range of motion.      Cervical back: Normal range of motion.   Skin:     General: Skin is warm.   Neurological:      General: No focal deficit present.      Mental Status: She is alert and oriented to person, place, and time.   Psychiatric:         Mood and Affect: Mood normal.         Behavior: Behavior normal.           Emotional Behavior:    Normal    Debilities:   None  Results Review:    I reviewed the patient's new clinical results.  Lab Results (most recent)       Procedure Component Value Units Date/Time    ALYSHA [[id number]]  (Normal) Collected: 12/22/24 1057    Specimen: Blood from Arm, Right Updated: 12/23/24 1026     Antinuclear Antibodies (ALYSHA) Negative    Hepatic Function Panel [[id number]]  (Abnormal) Collected: 12/23/24 0435    Specimen: Blood from Arm, Left Updated: 12/23/24 0747     Total Protein 6.8 g/dL      Albumin 3.6 g/dL      ALT (SGPT) 96 U/L      AST (SGOT) 99 U/L      Comment: Specimen hemolyzed.  Result may be falsely elevated.        Alkaline Phosphatase 381 U/L      Total Bilirubin 1.4 mg/dL      Bilirubin, Direct 0.5 mg/dL      Comment: Specimen hemolyzed. Results may be affected.        Bilirubin, Indirect 0.9 mg/dL     Basic Metabolic Panel [[id number]]  (Abnormal) Collected: 12/23/24 0435    Specimen: Blood from Arm, Left Updated: 12/23/24 0613     Glucose 88 mg/dL      BUN 11 mg/dL      Creatinine 0.86 mg/dL      Sodium 139 mmol/L      Potassium 3.7 mmol/L      Comment: Specimen hemolyzed.  Result may be falsely elevated.        Chloride 105 mmol/L      CO2 21.8 mmol/L      Calcium 9.6 mg/dL      BUN/Creatinine Ratio 12.8     Anion Gap 12.2 mmol/L      eGFR 79.4 mL/min/1.73     Narrative:      GFR Categories in  Chronic Kidney Disease (CKD)      GFR Category          GFR (mL/min/1.73)    Interpretation  G1                     90 or greater         Normal or high (1)  G2                      60-89                Mild decrease (1)  G3a                   45-59                Mild to moderate decrease  G3b                   30-44                Moderate to severe decrease  G4                    15-29                Severe decrease  G5                    14 or less           Kidney failure          (1)In the absence of evidence of kidney disease, neither GFR category G1 or G2 fulfill the criteria for CKD.    eGFR calculation 2021 CKD-EPI creatinine equation, which does not include race as a factor    CBC & Differential [[id number]]  (Normal) Collected: 12/23/24 0435    Specimen: Blood from Arm, Left Updated: 12/23/24 0554    Narrative:      The following orders were created for panel order CBC & Differential.  Procedure                               Abnormality         Status                     ---------                               -----------         ------                     CBC Auto Differential[[id number]]        Normal              Final result                 Please view results for these tests on the individual orders.    CBC Auto Differential [[id number]]  (Normal) Collected: 12/23/24 0435    Specimen: Blood from Arm, Left Updated: 12/23/24 0554     WBC 5.06 10*3/mm3      RBC 4.66 10*6/mm3      Hemoglobin 13.9 g/dL      Hematocrit 43.6 %      MCV 93.6 fL      MCH 29.8 pg      MCHC 31.9 g/dL      RDW 12.3 %      RDW-SD 42.3 fl      MPV 11.6 fL      Platelets 162 10*3/mm3      Neutrophil % 62.8 %      Lymphocyte % 27.3 %      Monocyte % 6.5 %      Eosinophil % 1.8 %      Basophil % 1.2 %      Immature Grans % 0.4 %      Neutrophils, Absolute 3.18 10*3/mm3      Lymphocytes, Absolute 1.38 10*3/mm3      Monocytes, Absolute 0.33 10*3/mm3      Eosinophils, Absolute 0.09 10*3/mm3      Basophils, Absolute 0.06 10*3/mm3       Immature Grans, Absolute 0.02 10*3/mm3      nRBC 0.0 /100 WBC     Blood Culture - Blood, Arm, Left [[id number]]  (Normal) Collected: 12/21/24 1953    Specimen: Blood from Arm, Left Updated: 12/22/24 2031     Blood Culture No growth at 24 hours    Blood Culture - Blood, Arm, Left [[id number]]  (Normal) Collected: 12/21/24 2013    Specimen: Blood from Arm, Left Updated: 12/22/24 2031     Blood Culture No growth at 24 hours    IgG subclasses (1-4) [[id number]] Collected: 12/22/24 1057    Specimen: Blood from Arm, Right Updated: 12/22/24 1110    Anti-Smooth Muscle Antibody Titer [[id number]] Collected: 12/22/24 1057    Specimen: Blood from Arm, Right Updated: 12/22/24 1109    Anti-microsomal Antibody [[id number]] Collected: 12/22/24 1057    Specimen: Blood from Arm, Right Updated: 12/22/24 1109    Soluble Liver Ag (IgG Ab) [[id number]] Collected: 12/22/24 1057    Specimen: Blood from Arm, Right Updated: 12/22/24 1109    Hepatic Function Panel [[id number]]  (Abnormal) Collected: 12/22/24 0611    Specimen: Blood from Arm, Right Updated: 12/22/24 0707     Total Protein 6.7 g/dL      Albumin 3.6 g/dL      ALT (SGPT) 97 U/L      AST (SGOT) 93 U/L      Alkaline Phosphatase 385 U/L      Total Bilirubin 0.9 mg/dL      Bilirubin, Direct 0.4 mg/dL      Bilirubin, Indirect 0.5 mg/dL     Lipase [[id number]]  (Normal) Collected: 12/22/24 0611    Specimen: Blood from Arm, Right Updated: 12/22/24 0707     Lipase 19 U/L     CBC Auto Differential [[id number]]  (Normal) Collected: 12/22/24 0611    Specimen: Blood from Arm, Right Updated: 12/22/24 0655     WBC 5.34 10*3/mm3      RBC 4.64 10*6/mm3      Hemoglobin 13.6 g/dL      Comment: .ckd        Hematocrit 41.3 %      MCV 89.0 fL      MCH 29.3 pg      MCHC 32.9 g/dL      RDW 12.6 %      RDW-SD 41.1 fl      MPV 11.3 fL      Platelets 182 10*3/mm3      Neutrophil % 64.1 %      Lymphocyte % 27.5 %      Monocyte % 6.0 %      Eosinophil % 1.5 %      Basophil % 0.7 %      Immature Grans % 0.2 %       Neutrophils, Absolute 3.42 10*3/mm3      Lymphocytes, Absolute 1.47 10*3/mm3      Monocytes, Absolute 0.32 10*3/mm3      Eosinophils, Absolute 0.08 10*3/mm3      Basophils, Absolute 0.04 10*3/mm3      Immature Grans, Absolute 0.01 10*3/mm3      nRBC 0.0 /100 WBC     Urinalysis, Microscopic Only - Urine, Clean Catch [[id number]]  (Abnormal) Collected: 12/21/24 2027    Specimen: Urine, Clean Catch Updated: 12/21/24 2038     RBC, UA 6-10 /HPF      WBC, UA 0-2 /HPF      Comment: Urine culture not indicated.        Bacteria, UA None Seen /HPF      Squamous Epithelial Cells, UA 7-12 /HPF      Hyaline Casts, UA 0-2 /LPF      Methodology Automated Microscopy    Urinalysis With Culture If Indicated - Urine, Clean Catch [[id number]]  (Abnormal) Collected: 12/21/24 2027    Specimen: Urine, Clean Catch Updated: 12/21/24 2036     Color, UA Dark Yellow     Appearance, UA Cloudy     pH, UA 5.5     Specific Gravity, UA 1.017     Glucose, UA Negative     Ketones, UA Trace     Bilirubin, UA Negative     Blood, UA Small (1+)     Protein, UA Negative     Leuk Esterase, UA Negative     Nitrite, UA Negative     Urobilinogen, UA 0.2 E.U./dL    Narrative:      In absence of clinical symptoms, the presence of pyuria, bacteria, and/or nitrites on the urinalysis result does not correlate with infection.    Comprehensive Metabolic Panel [[id number]]  (Abnormal) Collected: 12/21/24 1946    Specimen: Blood Updated: 12/21/24 2017     Glucose 96 mg/dL      BUN 15 mg/dL      Creatinine 0.88 mg/dL      Sodium 142 mmol/L      Potassium 4.0 mmol/L      Comment: Slight hemolysis detected by analyzer. Result may be falsely elevated.        Chloride 104 mmol/L      CO2 26.8 mmol/L      Calcium 10.5 mg/dL      Total Protein 7.9 g/dL      Albumin 4.3 g/dL      ALT (SGPT) 126 U/L      AST (SGOT) 122 U/L      Alkaline Phosphatase 465 U/L      Total Bilirubin 0.6 mg/dL      Globulin 3.6 gm/dL      A/G Ratio 1.2 g/dL      BUN/Creatinine Ratio 17.0     Anion Gap  11.2 mmol/L      eGFR 77.2 mL/min/1.73     Narrative:      GFR Categories in Chronic Kidney Disease (CKD)      GFR Category          GFR (mL/min/1.73)    Interpretation  G1                     90 or greater         Normal or high (1)  G2                      60-89                Mild decrease (1)  G3a                   45-59                Mild to moderate decrease  G3b                   30-44                Moderate to severe decrease  G4                    15-29                Severe decrease  G5                    14 or less           Kidney failure          (1)In the absence of evidence of kidney disease, neither GFR category G1 or G2 fulfill the criteria for CKD.    eGFR calculation 2021 CKD-EPI creatinine equation, which does not include race as a factor    Lipase [[id number]]  (Normal) Collected: 12/21/24 1946    Specimen: Blood Updated: 12/21/24 2015     Lipase 20 U/L     C-reactive Protein [[id number]]  (Abnormal) Collected: 12/21/24 1946    Specimen: Blood Updated: 12/21/24 2015     C-Reactive Protein 1.32 mg/dL     CBC & Differential [[id number]]  (Abnormal) Collected: 12/21/24 1946    Specimen: Blood Updated: 12/21/24 1951    Narrative:      The following orders were created for panel order CBC & Differential.  Procedure                               Abnormality         Status                     ---------                               -----------         ------                     CBC Auto Differential[[id number]]        Abnormal            Final result                 Please view results for these tests on the individual orders.    POC Lactate [[id number]]  (Normal) Collected: 12/21/24 1949    Specimen: Blood Updated: 12/21/24 1951     Lactate 0.7 mmol/L      Comment: Serial Number: [id number]:  [id number]               Imaging Results (Most Recent)       Procedure Component Value Units Date/Time    FL ercp biliary duct only [[id number]] Resulted: 12/23/24 1514     Updated: 12/23/24 1514    MRI abdomen  w wo contrast mrcp [[id number]] Collected: 12/23/24 0449     Updated: 12/23/24 0454    Narrative:      MRI ABDOMEN W WO CONTRAST MRCP    Date of Exam: 12/22/2024 3:16 PM EST    Indication: elevated LFt's check for stone.     Comparison: None available.    Technique:  Routine multiplanar/multisequence images of the abdomen were obtained with MRCP sequences before and after the uneventful administration of Prohance.      Findings:  Liver size and signal is normal. No solid or cystic liver lesion. The spleen is normal size and signal without solid or cystic lesion.    Patient is status post cholecystectomy. Common bile duct is dilated measuring up to 12 mm. Mild intrahepatic biliary dilatation with the intrahepatic bile ducts measuring up to 6 mm    The kidneys appear normal in size and signal without solid or cystic lesion or hydronephrosis. Heart size is normal. There is a small hiatal hernia. Lung bases are grossly clear. No evidence of acute pancreatitis. The visualized bowel is nondistended. No   adenopathy or ascites.. No evidence of biliary stricture, stone or mass. The pancreatic duct is normal caliber and smooth in character throughout the gland.      Impression:      Impression:  1.Status post cholecystectomy with mild intrahepatic and extrahepatic biliary dilatation. No evidence of choledocholithiasis or biliary stricture.  2.Small hiatal hernia.          Electronically Signed: Alphonso Fagan MD    12/23/2024 4:52 AM EST    Workstation ID: [id number]    CT Abdomen Pelvis With Contrast [[id number]] Collected: 12/21/24 2228     Updated: 12/21/24 2233    Narrative:      CT ABDOMEN PELVIS W CONTRAST    Date of Exam: 12/21/2024 9:55 PM EST    Indication: Right sided abdominal pain.    Comparison: 10/5/2023.    Technique: Axial CT images were obtained of the abdomen and pelvis following the uneventful intravenous administration of iodinated contrast. Sagittal and coronal reconstructions were performed.  Automated  exposure control and iterative reconstruction   methods were used.    Findings:  Heart size is normal. Distal esophagus is unremarkable. There is a small hiatal hernia. The lung bases are clear. There are no acute findings in the superficial soft tissues. No acute osseous abnormality or destructive bone lesion. There are mild   thoracolumbar degenerative changes.    The liver is homogeneous. There is mild pneumobilia. Patient is status post cholecystectomy. Common bile duct is mildly dilated at 9 mm, which is unchanged and likely physiologic. The pancreas, mid and distal stomach, duodenum and spleen appear within   normal limits. Adrenal glands are normal. Kidneys are homogeneous. No hydronephrosis. The urinary bladder, uterus and ovaries appear normal. The appendix is normal. There is extensive colonic diverticulosis. No small bowel distention. The abdominal and   pelvic vasculature appear within normal limits. No ascites, pneumoperitoneum or lymphadenopathy.      Impression:      Impression:  1.No acute abdominal or pelvic abnormality.  2.Status post cholecystectomy.  3.Extensive colonic diverticulosis.                Electronically Signed: Alphonso Fagan MD    12/21/2024 10:31 PM EST    Workstation ID: [id number]          reviewed    ECG/EMG Results (most recent)       None          reviewed            Microbiology Results (last 10 days)       Procedure Component Value - Date/Time    Blood Culture - Blood, Arm, Left [[id number]]  (Normal) Collected: 12/21/24 2013    Lab Status: Preliminary result Specimen: Blood from Arm, Left Updated: 12/22/24 2031     Blood Culture No growth at 24 hours    Blood Culture - Blood, Arm, Left [[id number]]  (Normal) Collected: 12/21/24 1953    Lab Status: Preliminary result Specimen: Blood from Arm, Left Updated: 12/22/24 2031     Blood Culture No growth at 24 hours            Assessment & Plan     Abdominal pain, acute, right upper quadrant          Right upper quadrant abdominal  pain  -CMP showed elevated liver enzymes  -Alkaline phosphatase 385, AST 93, ALT 97  -CBC and lipase unremarkable  -Lactate 0.7  -C-reactive protein 1.32  -UA showed small hematuria but no bacteria or nitrites  -Blood cultures negative  -CT abdomen and pelvis showed no acute abnormality and status postcholecystectomy  -GI consulted and ordered MRCP which showed mild intrahepatic and extrahepatic biliary dilatation. No evidence of choledocholithiasis or biliary stricture.   -ERCP 12/23 showed dilated extrahepatic duct status post cholecystectomy and sphincterotomy, but no stones or sludge identified.  -GI recommended ursodiol 300 mg p.o. twice daily   -Low-fat diet         Hypertension  -Moderately controlled   BP Readings from Last 1 Encounters:   12/23/24 120/60         BP Readings from Last 1 Encounters:   12/22/24 138/80   - Continue Ziac  - Monitor while admitted      Obesity  -BMI 43.86  -Lifestyle modifications       I discussed the patients findings and my recommendations with patient and nursing staff.     Discharge Diagnosis:      Abdominal pain, acute, right upper quadrant      Hospital Course  Patient is a 54 y.o. female presented with abdominal pain as noted on HPI above. Patient has a history of choledolithiasis. Liver enzymes are elvated but CBC, lipase, lactace, UA and blood cultures were unremarkable. Admitted  to the observation unit with a GI consult. CT abdomen and pelvis showed no acute abnormality and MRCP showed mild intrahepatic and extrahepatic biliary dilatation but was negative for choledocholithiasis or biliary stricture. ERCP showed dilated extrahepatic duct status post cholecystectomy and sphincterotomy, but no stones or sludge identified. Patient cleared by GI for discharge with recommendations to start usodiol 300 mg p.o. twice daily and f/u in 1 month. At this time, patient felt to be in good condition for discharge with close follow up with PCP. Instructed to have a low fat diet,  take all medications as prescribed and to return to ED if any concerning signs/symptoms. All test/lab results were discussed with patient. All questions were answered and patient verbalizes understanding.      .      Past Medical History:     Past Medical History:   Diagnosis Date    Hypertension     Obesity        Past Surgical History:     Past Surgical History:   Procedure Laterality Date    CHOLECYSTECTOMY      DENTAL PROCEDURE      ERCP N/A 10/6/2023    Procedure: ENDOSCOPIC RETROGRADE CHOLANGIOPANCREATOGRAPHYWITH BALLOON CLEAREANCE (12MM - 15MM BALLOON) UP TO 15MM OF BILE DUCT;  Surgeon: Valentin Campos MD;  Location: [facility] ENDOSCOPY;  Service: Gastroenterology;  Laterality: N/A;  CHOLANGITIS       Social History:   Social History     Socioeconomic History    Marital status:    Tobacco Use    Smoking status: Never     Passive exposure: Never    Smokeless tobacco: Never   Vaping Use    Vaping status: Never Used   Substance and Sexual Activity    Alcohol use: Not Currently    Drug use: Never    Sexual activity: Defer       Procedures Performed    Procedure(s):  ENDOSCOPIC RETROGRADE CHOLANGIOPANCREATOGRAPHY with balloon clearance of common bile duct  -------------------  12/23 1444 ERCP    Consults:   Consults       Date and Time Order Name Status Description    12/22/2024 12:26 AM Inpatient Gastroenterology Consult              Condition on Discharge:     Stable    Discharge Disposition  Home or Self Care    Discharge Medications     Discharge Medications        New Medications        Instructions Start Date   ursodiol 300 MG capsule  Commonly known as: ACTIGALL   300 mg, Oral, 2 Times Daily             Continue These Medications        Instructions Start Date   bisoprolol-hydrochlorothiazide 10-6.25 MG per tablet  Commonly known as: ZIAC   1 tablet, Oral, Daily      VITAMIN D3 PO   2,000 Units, Daily               Discharge Diet:   Diet Instructions       Diet: Gastrointestinal Diets;  Fat-Restricted; Regular (IDDSI 7); Thin (IDDSI 0)      Discharge Diet: Gastrointestinal Diets    Gastrointestinal Diet: Fat-Restricted    Texture: Regular (IDDSI 7)    Fluid Consistency: Thin (IDDSI 0)            Activity at Discharge:     Follow-up Appointments  Future Appointments   Date Time Provider Department Center   6/18/2025  8:15 AM Kai Lea APRN [facility]     Additional Instructions for the Follow-ups that You Need to Schedule       Discharge Follow-up with PCP   As directed       Currently Documented PCP:    Kai Lea APRN    PCP Phone Number:    [phone]     Follow Up Details: 5-7 days        Discharge Follow-up with Specified Provider: Beth; 1 Month   As directed      To: Beth   Follow Up: 1 Month                Test Results Pending at Discharge  Pending Results       Procedure [Order ID] Specimen - Date/Time    Anti-Smooth Muscle Antibody Titer [[id number]] Collected: 12/22/24 1057    Specimen: Blood from Arm, Right Updated: 12/22/24 1109    Anti-microsomal Antibody [[id number]] Collected: 12/22/24 1057    Specimen: Blood from Arm, Right Updated: 12/22/24 1109    FL ercp biliary duct only [[id number]] Resulted: 12/23/24 1514     Updated: 12/23/24 1514    IgG subclasses (1-4) [[id number]] Collected: 12/22/24 1057    Specimen: Blood from Arm, Right Updated: 12/22/24 1110    Soluble Liver Ag (IgG Ab) [[id number]] Collected: 12/22/24 1057    Specimen: Blood from Arm, Right Updated: 12/22/24 1109             Risk for Readmission (LACE) Score: 2 (12/23/2024  6:00 AM)      Greater than 30 minutes spent in discharge activities for this patient    Signature:Electronically signed by SANYA Tran, 12/23/24, 3:46 PM EST.

## 2024-12-24 ENCOUNTER — TRANSITIONAL CARE MANAGEMENT TELEPHONE ENCOUNTER (OUTPATIENT)
Dept: CALL CENTER | Facility: HOSPITAL | Age: 54
End: 2024-12-24
Payer: COMMERCIAL

## 2024-12-24 LAB
IGG SERPL-MCNC: 1123 MG/DL (ref 586–1602)
IGG1 SER-MCNC: 424 MG/DL (ref 248–810)
IGG2 SER-MCNC: 429 MG/DL (ref 130–555)
IGG3 SER-MCNC: 48 MG/DL (ref 15–102)
IGG4 SER-MCNC: 55 MG/DL (ref 2–96)

## 2024-12-24 NOTE — OUTREACH NOTE
Prep Survey      Flowsheet Row Responses   [facility] facility patient discharged from? [facility]   Is LACE score < 7 ? Yes   Eligibility [facility]   Date of Admission 12/21/24   Date of Discharge 12/23/24   Discharge Disposition Home or Self Care   Discharge diagnosis Abd pain-endoscopic retrograde cholangiopancreatography   Does the patient have one of the following disease processes/diagnoses(primary or secondary)? Other   Does the patient have Home health ordered? No   Is there a DME ordered? No   Prep survey completed? Yes            DESMOND IZQUIERDO - Registered Nurse

## 2024-12-24 NOTE — OUTREACH NOTE
Call Center TCM Note      Flowsheet Row Responses   [facility] patient discharged from? [facility]   Does the patient have one of the following disease processes/diagnoses(primary or secondary)? Other   TCM attempt successful? No  [JUVENAL deleon, letty]   Unsuccessful attempts Attempt 1   Call Status Left message            Eugenie Oglesby RN    12/24/2024, 13:15 EST

## 2024-12-24 NOTE — CASE MANAGEMENT/SOCIAL WORK
Case Management Discharge Note      Final Note: Home    Provided Post Acute Provider List?: N/A  Provided Post Acute Provider Quality & Resource List?: N/A    Selected Continued Care - Discharged on 12/23/2024 Admission date: 12/21/2024 - Discharge disposition: Home or Self Care       Transportation Services  Private: Car    Final Discharge Disposition Code: 01 - home or self-care

## 2024-12-24 NOTE — OUTREACH NOTE
Call Center TCM Note      Flowsheet Row Responses   [facility] patient discharged from? [facility]   Does the patient have one of the following disease processes/diagnoses(primary or secondary)? Other   TCM attempt successful? Yes   Call start time 1344   Call end time 1346   Discharge diagnosis Abd pain-endoscopic retrograde cholangiopancreatography   Meds reviewed with patient/caregiver? Yes   Is the patient having any side effects they believe may be caused by any medication additions or changes? No   Does the patient have all medications ordered at discharge? Yes   Is the patient taking all medications as directed (includes completed medication regime)? Yes   Does the patient have an appointment with their PCP within 7-14 days of discharge? No   Nursing Interventions Patient declined scheduling/rescheduling appointment at this time   Has home health visited the patient within 72 hours of discharge? N/A   Psychosocial issues? No   Did the patient receive a copy of their discharge instructions? Yes   Nursing interventions Reviewed instructions with patient   What is the patient's perception of their health status since discharge? Improving   Is the patient/caregiver able to teach back signs and symptoms related to disease process for when to call PCP? Yes   Is the patient/caregiver able to teach back signs and symptoms related to disease process for when to call 911? Yes   Is the patient/caregiver able to teach back the hierarchy of who to call/visit for symptoms/problems? PCP, Specialist, Home health nurse, Urgent Care, ED, 911 Yes   If the patient is a current smoker, are they able to teach back resources for cessation? Not a smoker   TCM call completed? Yes   Wrap up additional comments States she is doing well, denies any questions or concerns, states she will be following up with GI.   Call end time 1346   Would this patient benefit from a Referral to Amb Social Work? No   Is the patient interested in  additional calls from an ambulatory ? No            Tanesha Farr RN    12/24/2024, 13:46 EST

## 2024-12-26 LAB
BACTERIA SPEC AEROBE CULT: NORMAL
BACTERIA SPEC AEROBE CULT: NORMAL
LKM-1 AB SER-ACNC: 1.5 UNITS (ref 0–20)
SMA IGG SER-ACNC: 8 UNITS (ref 0–19)
SOLUBLE LIVER IGG SER IA-ACNC: 1.5 UNITS (ref 0–20)

## 2025-01-09 ENCOUNTER — OFFICE (AMBULATORY)
Dept: URBAN - METROPOLITAN AREA PATHOLOGY 19 | Facility: PATHOLOGY | Age: 55
End: 2025-01-09
Payer: COMMERCIAL

## 2025-01-09 ENCOUNTER — ON CAMPUS - OUTPATIENT (AMBULATORY)
Dept: URBAN - METROPOLITAN AREA HOSPITAL 2 | Facility: HOSPITAL | Age: 55
End: 2025-01-09
Payer: COMMERCIAL

## 2025-01-09 VITALS
OXYGEN SATURATION: 100 % | DIASTOLIC BLOOD PRESSURE: 56 MMHG | RESPIRATION RATE: 14 BRPM | DIASTOLIC BLOOD PRESSURE: 94 MMHG | SYSTOLIC BLOOD PRESSURE: 101 MMHG | SYSTOLIC BLOOD PRESSURE: 110 MMHG | TEMPERATURE: 98.4 F | HEART RATE: 66 BPM | SYSTOLIC BLOOD PRESSURE: 113 MMHG | HEART RATE: 72 BPM | SYSTOLIC BLOOD PRESSURE: 150 MMHG | WEIGHT: 240 LBS | RESPIRATION RATE: 19 BRPM | SYSTOLIC BLOOD PRESSURE: 97 MMHG | HEART RATE: 78 BPM | DIASTOLIC BLOOD PRESSURE: 60 MMHG | OXYGEN SATURATION: 94 % | DIASTOLIC BLOOD PRESSURE: 67 MMHG | SYSTOLIC BLOOD PRESSURE: 112 MMHG | OXYGEN SATURATION: 96 % | RESPIRATION RATE: 18 BRPM | HEART RATE: 75 BPM | RESPIRATION RATE: 15 BRPM | DIASTOLIC BLOOD PRESSURE: 57 MMHG | OXYGEN SATURATION: 99 % | DIASTOLIC BLOOD PRESSURE: 87 MMHG | RESPIRATION RATE: 16 BRPM | HEART RATE: 62 BPM | HEART RATE: 67 BPM | HEART RATE: 79 BPM | OXYGEN SATURATION: 98 % | DIASTOLIC BLOOD PRESSURE: 73 MMHG | HEIGHT: 63 IN | HEART RATE: 74 BPM | HEART RATE: 70 BPM | SYSTOLIC BLOOD PRESSURE: 108 MMHG | DIASTOLIC BLOOD PRESSURE: 75 MMHG | DIASTOLIC BLOOD PRESSURE: 95 MMHG | HEART RATE: 76 BPM | SYSTOLIC BLOOD PRESSURE: 118 MMHG | SYSTOLIC BLOOD PRESSURE: 93 MMHG | SYSTOLIC BLOOD PRESSURE: 141 MMHG

## 2025-01-09 DIAGNOSIS — K64.1 SECOND DEGREE HEMORRHOIDS: ICD-10-CM

## 2025-01-09 DIAGNOSIS — Z12.11 ENCOUNTER FOR SCREENING FOR MALIGNANT NEOPLASM OF COLON: ICD-10-CM

## 2025-01-09 DIAGNOSIS — D12.2 BENIGN NEOPLASM OF ASCENDING COLON: ICD-10-CM

## 2025-01-09 DIAGNOSIS — K57.30 DIVERTICULOSIS OF LARGE INTESTINE WITHOUT PERFORATION OR ABS: ICD-10-CM

## 2025-01-09 PROBLEM — K63.5 POLYP OF COLON: Status: ACTIVE | Noted: 2025-01-09

## 2025-01-09 LAB
GI HISTOLOGY: A. ASCENDING COLON: (no result)
GI HISTOLOGY: PDF REPORT: (no result)

## 2025-01-09 PROCEDURE — 88305 TISSUE EXAM BY PATHOLOGIST: CPT | Performed by: PATHOLOGY

## 2025-01-09 PROCEDURE — 45385 COLONOSCOPY W/LESION REMOVAL: CPT | Mod: 33 | Performed by: INTERNAL MEDICINE

## 2025-01-14 ENCOUNTER — OFFICE (AMBULATORY)
Dept: URBAN - METROPOLITAN AREA CLINIC 64 | Facility: CLINIC | Age: 55
End: 2025-01-14

## 2025-01-14 VITALS
HEART RATE: 78 BPM | SYSTOLIC BLOOD PRESSURE: 135 MMHG | WEIGHT: 243 LBS | HEIGHT: 63 IN | DIASTOLIC BLOOD PRESSURE: 87 MMHG

## 2025-01-14 DIAGNOSIS — R79.89 OTHER SPECIFIED ABNORMAL FINDINGS OF BLOOD CHEMISTRY: ICD-10-CM

## 2025-01-14 DIAGNOSIS — R94.5 ABNORMAL RESULTS OF LIVER FUNCTION STUDIES: ICD-10-CM

## 2025-01-14 DIAGNOSIS — E66.01 MORBID (SEVERE) OBESITY DUE TO EXCESS CALORIES: ICD-10-CM

## 2025-01-14 PROCEDURE — 99214 OFFICE O/P EST MOD 30 MIN: CPT | Performed by: NURSE PRACTITIONER

## 2025-01-17 LAB
ANA BY IFA RFX TITER/PATTERN: POSITIVE
CBC WITH DIFFERENTIAL/PLATELET: BASO (ABSOLUTE): 0 X10E3/UL (ref 0–0.2)
CBC WITH DIFFERENTIAL/PLATELET: BASOS: 1 %
CBC WITH DIFFERENTIAL/PLATELET: EOS (ABSOLUTE): 0.1 X10E3/UL (ref 0–0.4)
CBC WITH DIFFERENTIAL/PLATELET: EOS: 1 %
CBC WITH DIFFERENTIAL/PLATELET: HEMATOCRIT: 46.5 % (ref 34–46.6)
CBC WITH DIFFERENTIAL/PLATELET: HEMATOLOGY COMMENTS: (no result)
CBC WITH DIFFERENTIAL/PLATELET: HEMOGLOBIN: 15.2 G/DL (ref 11.1–15.9)
CBC WITH DIFFERENTIAL/PLATELET: IMMATURE CELLS: (no result)
CBC WITH DIFFERENTIAL/PLATELET: IMMATURE GRANS (ABS): 0 X10E3/UL (ref 0–0.1)
CBC WITH DIFFERENTIAL/PLATELET: IMMATURE GRANULOCYTES: 0 %
CBC WITH DIFFERENTIAL/PLATELET: LYMPHS (ABSOLUTE): 1.3 X10E3/UL (ref 0.7–3.1)
CBC WITH DIFFERENTIAL/PLATELET: LYMPHS: 24 %
CBC WITH DIFFERENTIAL/PLATELET: MCH: 29.1 PG (ref 26.6–33)
CBC WITH DIFFERENTIAL/PLATELET: MCHC: 32.7 G/DL (ref 31.5–35.7)
CBC WITH DIFFERENTIAL/PLATELET: MCV: 89 FL (ref 79–97)
CBC WITH DIFFERENTIAL/PLATELET: MONOCYTES(ABSOLUTE): 0.4 X10E3/UL (ref 0.1–0.9)
CBC WITH DIFFERENTIAL/PLATELET: MONOCYTES: 7 %
CBC WITH DIFFERENTIAL/PLATELET: NEUTROPHILS (ABSOLUTE): 3.6 X10E3/UL (ref 1.4–7)
CBC WITH DIFFERENTIAL/PLATELET: NEUTROPHILS: 67 %
CBC WITH DIFFERENTIAL/PLATELET: NRBC: (no result)
CBC WITH DIFFERENTIAL/PLATELET: PLATELETS: 199 X10E3/UL (ref 150–450)
CBC WITH DIFFERENTIAL/PLATELET: RBC: 5.23 X10E6/UL (ref 3.77–5.28)
CBC WITH DIFFERENTIAL/PLATELET: RDW: 12.1 % (ref 11.7–15.4)
CBC WITH DIFFERENTIAL/PLATELET: WBC: 5.3 X10E3/UL (ref 3.4–10.8)
COMP. METABOLIC PANEL (14): ALBUMIN: 4.1 G/DL (ref 3.8–4.9)
COMP. METABOLIC PANEL (14): ALKALINE PHOSPHATASE: 163 IU/L — HIGH (ref 44–121)
COMP. METABOLIC PANEL (14): ALT (SGPT): 16 IU/L (ref 0–32)
COMP. METABOLIC PANEL (14): AST (SGOT): 27 IU/L (ref 0–40)
COMP. METABOLIC PANEL (14): BILIRUBIN, TOTAL: 0.6 MG/DL (ref 0–1.2)
COMP. METABOLIC PANEL (14): BUN/CREATININE RATIO: 18 (ref 9–23)
COMP. METABOLIC PANEL (14): BUN: 14 MG/DL (ref 6–24)
COMP. METABOLIC PANEL (14): CALCIUM: 10.1 MG/DL (ref 8.7–10.2)
COMP. METABOLIC PANEL (14): CARBON DIOXIDE, TOTAL: 23 MMOL/L (ref 20–29)
COMP. METABOLIC PANEL (14): CHLORIDE: 106 MMOL/L (ref 96–106)
COMP. METABOLIC PANEL (14): CREATININE: 0.76 MG/DL (ref 0.57–1)
COMP. METABOLIC PANEL (14): EGFR: 92 ML/MIN/1.73 (ref 59–?)
COMP. METABOLIC PANEL (14): GLOBULIN, TOTAL: 2.6 G/DL (ref 1.5–4.5)
COMP. METABOLIC PANEL (14): GLUCOSE: 102 MG/DL — HIGH (ref 70–99)
COMP. METABOLIC PANEL (14): POTASSIUM: 4.3 MMOL/L (ref 3.5–5.2)
COMP. METABOLIC PANEL (14): PROTEIN, TOTAL: 6.7 G/DL (ref 6–8.5)
COMP. METABOLIC PANEL (14): SODIUM: 139 MMOL/L (ref 134–144)
FANA STAINING PATTERNS: CENTRIOLE PATTERN: (no result)
FANA STAINING PATTERNS: CENTROMERE PATTERN: (no result)
FANA STAINING PATTERNS: HOMOGENEOUS PATTERN: (no result)
FANA STAINING PATTERNS: MIDBODY PATTERN: (no result)
FANA STAINING PATTERNS: NOTE: (no result)
FANA STAINING PATTERNS: NUCLEAR DOT PATTERN: (no result)
FANA STAINING PATTERNS: NUCLEAR MEMBRANE PATTERN: (no result)
FANA STAINING PATTERNS: NUCLEOLAR PATTERN: (no result)
FANA STAINING PATTERNS: PCNA PATTERN: (no result)
FANA STAINING PATTERNS: SPECKLED PATTERN: HIGH
FANA STAINING PATTERNS: SPINDLE APPARATUS PATTERN: (no result)
GGT: 121 IU/L — HIGH (ref 0–60)
HERED.HEMOCHROMATOSIS, DNA: HEREDITARY  HEMOCHROMATOSIS: (no result)
HERED.HEMOCHROMATOSIS, DNA: REVIEWED BY: (no result)

## 2025-02-01 ENCOUNTER — OFFICE (AMBULATORY)
Dept: URBAN - METROPOLITAN AREA CLINIC 64 | Facility: CLINIC | Age: 55
End: 2025-02-01

## 2025-02-28 ENCOUNTER — OFFICE (AMBULATORY)
Dept: URBAN - METROPOLITAN AREA CLINIC 64 | Facility: CLINIC | Age: 55
End: 2025-02-28
Payer: COMMERCIAL

## 2025-02-28 DIAGNOSIS — E66.01 MORBID (SEVERE) OBESITY DUE TO EXCESS CALORIES: ICD-10-CM

## 2025-02-28 PROCEDURE — 99457 RPM TX MGMT 1ST 20 MIN: CPT | Performed by: NURSE PRACTITIONER

## 2025-02-28 PROCEDURE — 99458 RPM TX MGMT EA ADDL 20 MIN: CPT | Performed by: NURSE PRACTITIONER

## 2025-05-20 ENCOUNTER — OFFICE (AMBULATORY)
Dept: URBAN - METROPOLITAN AREA CLINIC 64 | Facility: CLINIC | Age: 55
End: 2025-05-20
Payer: COMMERCIAL

## 2025-05-20 VITALS
WEIGHT: 234 LBS | DIASTOLIC BLOOD PRESSURE: 93 MMHG | SYSTOLIC BLOOD PRESSURE: 136 MMHG | DIASTOLIC BLOOD PRESSURE: 89 MMHG | HEART RATE: 76 BPM | SYSTOLIC BLOOD PRESSURE: 148 MMHG | HEIGHT: 63 IN

## 2025-05-20 DIAGNOSIS — R94.5 ABNORMAL RESULTS OF LIVER FUNCTION STUDIES: ICD-10-CM

## 2025-05-20 PROBLEM — K63.5 POLYP OF COLON: Status: ACTIVE | Noted: 2025-01-09

## 2025-05-20 PROCEDURE — 99213 OFFICE O/P EST LOW 20 MIN: CPT | Performed by: NURSE PRACTITIONER

## 2025-05-22 LAB
ALK PHOS ISOENZYME: BONE FRACTION: 41 % (ref 14–68)
ALK PHOS ISOENZYME: INTESTINAL FRAC.: 8 % (ref 0–18)
ALK PHOS ISOENZYME: LIVER FRACTION: 51 % (ref 18–85)
COMP. METABOLIC PANEL (14): ALBUMIN: 4.1 G/DL (ref 3.8–4.9)
COMP. METABOLIC PANEL (14): ALKALINE PHOSPHATASE: 126 IU/L — HIGH (ref 44–121)
COMP. METABOLIC PANEL (14): ALT (SGPT): 17 IU/L (ref 0–32)
COMP. METABOLIC PANEL (14): AST (SGOT): 24 IU/L (ref 0–40)
COMP. METABOLIC PANEL (14): BILIRUBIN, TOTAL: 0.6 MG/DL (ref 0–1.2)
COMP. METABOLIC PANEL (14): BUN/CREATININE RATIO: 17 (ref 9–23)
COMP. METABOLIC PANEL (14): BUN: 14 MG/DL (ref 6–24)
COMP. METABOLIC PANEL (14): CALCIUM: 10 MG/DL (ref 8.7–10.2)
COMP. METABOLIC PANEL (14): CARBON DIOXIDE, TOTAL: 23 MMOL/L (ref 20–29)
COMP. METABOLIC PANEL (14): CHLORIDE: 106 MMOL/L (ref 96–106)
COMP. METABOLIC PANEL (14): CREATININE: 0.83 MG/DL (ref 0.57–1)
COMP. METABOLIC PANEL (14): EGFR: 83 ML/MIN/1.73 (ref 59–?)
COMP. METABOLIC PANEL (14): GLOBULIN, TOTAL: 2.5 G/DL (ref 1.5–4.5)
COMP. METABOLIC PANEL (14): GLUCOSE: 94 MG/DL (ref 70–99)
COMP. METABOLIC PANEL (14): POTASSIUM: 4.1 MMOL/L (ref 3.5–5.2)
COMP. METABOLIC PANEL (14): PROTEIN, TOTAL: 6.6 G/DL (ref 6–8.5)
COMP. METABOLIC PANEL (14): SODIUM: 142 MMOL/L (ref 134–144)
GGT: 35 IU/L (ref 0–60)

## 2025-05-29 ENCOUNTER — TRANSCRIBE ORDERS (OUTPATIENT)
Dept: ADMINISTRATIVE | Facility: HOSPITAL | Age: 55
End: 2025-05-29
Payer: COMMERCIAL

## 2025-05-29 DIAGNOSIS — Z13.820 SPECIAL SCREENING FOR OSTEOPOROSIS: ICD-10-CM

## 2025-05-29 DIAGNOSIS — R74.8 ALKALINE PHOSPHATASE RAISED: Primary | ICD-10-CM

## 2025-06-13 ENCOUNTER — HOSPITAL ENCOUNTER (OUTPATIENT)
Dept: BONE DENSITY | Facility: HOSPITAL | Age: 55
Discharge: HOME | End: 2025-06-13
Admitting: NURSE PRACTITIONER
Payer: COMMERCIAL

## 2025-06-13 DIAGNOSIS — Z13.820 SPECIAL SCREENING FOR OSTEOPOROSIS: ICD-10-CM

## 2025-06-13 DIAGNOSIS — R74.8 ALKALINE PHOSPHATASE RAISED: ICD-10-CM

## 2025-06-13 PROCEDURE — 77080 DXA BONE DENSITY AXIAL: CPT

## 2025-08-19 ENCOUNTER — ON CAMPUS - OUTPATIENT (AMBULATORY)
Dept: URBAN - METROPOLITAN AREA HOSPITAL 2 | Facility: HOSPITAL | Age: 55
End: 2025-08-19
Payer: COMMERCIAL

## 2025-08-19 VITALS
OXYGEN SATURATION: 98 % | OXYGEN SATURATION: 99 % | HEART RATE: 67 BPM | HEART RATE: 73 BPM | RESPIRATION RATE: 16 BRPM | OXYGEN SATURATION: 100 % | OXYGEN SATURATION: 95 % | HEART RATE: 80 BPM | DIASTOLIC BLOOD PRESSURE: 85 MMHG | HEART RATE: 82 BPM | DIASTOLIC BLOOD PRESSURE: 102 MMHG | SYSTOLIC BLOOD PRESSURE: 130 MMHG | WEIGHT: 227 LBS | RESPIRATION RATE: 17 BRPM | DIASTOLIC BLOOD PRESSURE: 57 MMHG | DIASTOLIC BLOOD PRESSURE: 55 MMHG | HEIGHT: 63 IN | TEMPERATURE: 98.3 F | HEART RATE: 68 BPM | SYSTOLIC BLOOD PRESSURE: 97 MMHG | DIASTOLIC BLOOD PRESSURE: 67 MMHG | SYSTOLIC BLOOD PRESSURE: 102 MMHG | HEART RATE: 69 BPM | DIASTOLIC BLOOD PRESSURE: 89 MMHG | DIASTOLIC BLOOD PRESSURE: 65 MMHG | SYSTOLIC BLOOD PRESSURE: 100 MMHG | SYSTOLIC BLOOD PRESSURE: 93 MMHG | RESPIRATION RATE: 14 BRPM | DIASTOLIC BLOOD PRESSURE: 54 MMHG | RESPIRATION RATE: 18 BRPM | SYSTOLIC BLOOD PRESSURE: 161 MMHG | HEART RATE: 70 BPM | SYSTOLIC BLOOD PRESSURE: 104 MMHG

## 2025-08-19 DIAGNOSIS — D17.79 BENIGN LIPOMATOUS NEOPLASM OF OTHER SITES: ICD-10-CM

## 2025-08-19 DIAGNOSIS — K63.89 OTHER SPECIFIED DISEASES OF INTESTINE: ICD-10-CM

## 2025-08-19 DIAGNOSIS — K57.30 DIVERTICULOSIS OF LARGE INTESTINE WITHOUT PERFORATION OR ABS: ICD-10-CM

## 2025-08-19 DIAGNOSIS — Z09 ENCOUNTER FOR FOLLOW-UP EXAMINATION AFTER COMPLETED TREATMEN: ICD-10-CM

## 2025-08-19 DIAGNOSIS — K64.1 SECOND DEGREE HEMORRHOIDS: ICD-10-CM

## 2025-08-19 DIAGNOSIS — Z86.0101 PERSONAL HISTORY OF ADENOMATOUS AND SERRATED COLON POLYPS: ICD-10-CM

## 2025-08-19 PROCEDURE — G0105 COLORECTAL SCRN; HI RISK IND: HCPCS | Performed by: INTERNAL MEDICINE

## (undated) DEVICE — BITEBLOCK ENDO W/STRAP 60F A/ LF DISP

## (undated) DEVICE — RETRIEVAL BALLOON CATHETER: Brand: EXTRACTOR™ PRO RX

## (undated) DEVICE — DEV POSITION LK AND BIOP CAP SYS

## (undated) DEVICE — PK ENDO GI 50

## (undated) DEVICE — SPHINCTEROTOME: Brand: DREAMTOME™ RX 44

## (undated) DEVICE — HIGH PERFORMANCE GUIDEWIRE: Brand: DREAMWIRE